# Patient Record
Sex: FEMALE | Race: WHITE | Employment: FULL TIME | URBAN - METROPOLITAN AREA
[De-identification: names, ages, dates, MRNs, and addresses within clinical notes are randomized per-mention and may not be internally consistent; named-entity substitution may affect disease eponyms.]

---

## 2017-01-20 ENCOUNTER — HOSPITAL ENCOUNTER (OUTPATIENT)
Dept: OTHER | Age: 47
Discharge: HOME | End: 2017-01-20
Attending: INTERNAL MEDICINE | Admitting: INTERNAL MEDICINE

## 2017-01-21 LAB
T3 FREE: 3.8 PG/ML (ref 2.3–4.2)
T4 FREE: 1 NG/DL (ref 0.9–1.8)
TSH SERPL DL<=0.05 MIU/L-ACNC: 0.28 UIU/ML (ref 0.27–4.2)

## 2017-01-24 ENCOUNTER — OFFICE VISIT (OUTPATIENT)
Dept: ENDOCRINOLOGY | Age: 47
End: 2017-01-24

## 2017-01-24 VITALS
OXYGEN SATURATION: 97 % | RESPIRATION RATE: 14 BRPM | DIASTOLIC BLOOD PRESSURE: 80 MMHG | TEMPERATURE: 98.3 F | WEIGHT: 249.8 LBS | HEIGHT: 70 IN | SYSTOLIC BLOOD PRESSURE: 118 MMHG | BODY MASS INDEX: 35.76 KG/M2 | HEART RATE: 78 BPM

## 2017-01-24 DIAGNOSIS — E04.1 LEFT THYROID NODULE: Primary | ICD-10-CM

## 2017-01-24 DIAGNOSIS — E66.9 OBESITY, UNSPECIFIED OBESITY SEVERITY, UNSPECIFIED OBESITY TYPE: ICD-10-CM

## 2017-01-24 DIAGNOSIS — E05.90 SUBCLINICAL HYPERTHYROIDISM: ICD-10-CM

## 2017-01-24 PROCEDURE — 99214 OFFICE O/P EST MOD 30 MIN: CPT | Performed by: INTERNAL MEDICINE

## 2017-01-24 RX ORDER — AMOXICILLIN 500 MG
CAPSULE ORAL
COMMUNITY
End: 2020-09-14 | Stop reason: DRUGHIGH

## 2017-03-27 ENCOUNTER — HOSPITAL ENCOUNTER (OUTPATIENT)
Dept: ULTRASOUND IMAGING | Age: 47
Discharge: HOME | End: 2017-03-27
Attending: INTERNAL MEDICINE | Admitting: INTERNAL MEDICINE

## 2017-03-27 DIAGNOSIS — E04.1 LEFT THYROID NODULE: ICD-10-CM

## 2017-03-27 DIAGNOSIS — E04.1 NONTOXIC SINGLE THYROID NODULE: ICD-10-CM

## 2017-05-02 ENCOUNTER — OFFICE VISIT (OUTPATIENT)
Dept: ENDOCRINOLOGY | Age: 47
End: 2017-05-02

## 2017-05-02 VITALS
DIASTOLIC BLOOD PRESSURE: 63 MMHG | SYSTOLIC BLOOD PRESSURE: 103 MMHG | WEIGHT: 241.6 LBS | HEART RATE: 66 BPM | TEMPERATURE: 98.4 F | BODY MASS INDEX: 34.67 KG/M2

## 2017-05-02 DIAGNOSIS — E04.1 LEFT THYROID NODULE: ICD-10-CM

## 2017-05-02 DIAGNOSIS — E05.90 SUBCLINICAL HYPERTHYROIDISM: ICD-10-CM

## 2017-05-02 DIAGNOSIS — E66.9 OBESITY, UNSPECIFIED OBESITY SEVERITY, UNSPECIFIED OBESITY TYPE: Primary | ICD-10-CM

## 2017-05-02 PROCEDURE — 99214 OFFICE O/P EST MOD 30 MIN: CPT | Performed by: INTERNAL MEDICINE

## 2017-08-08 ENCOUNTER — OFFICE VISIT (OUTPATIENT)
Dept: ENDOCRINOLOGY | Age: 47
End: 2017-08-08

## 2017-08-08 VITALS
SYSTOLIC BLOOD PRESSURE: 108 MMHG | HEIGHT: 70 IN | OXYGEN SATURATION: 96 % | HEART RATE: 82 BPM | DIASTOLIC BLOOD PRESSURE: 79 MMHG | BODY MASS INDEX: 34.59 KG/M2 | WEIGHT: 241.6 LBS

## 2017-08-08 DIAGNOSIS — E05.90 SUBCLINICAL HYPERTHYROIDISM: Primary | ICD-10-CM

## 2017-08-08 DIAGNOSIS — E04.1 LEFT THYROID NODULE: ICD-10-CM

## 2017-08-08 DIAGNOSIS — E66.9 OBESITY, UNSPECIFIED OBESITY SEVERITY, UNSPECIFIED OBESITY TYPE: ICD-10-CM

## 2017-08-08 PROCEDURE — 99214 OFFICE O/P EST MOD 30 MIN: CPT | Performed by: INTERNAL MEDICINE

## 2017-11-08 ENCOUNTER — HOSPITAL ENCOUNTER (OUTPATIENT)
Dept: OTHER | Age: 47
Discharge: HOME | End: 2017-11-08
Attending: INTERNAL MEDICINE | Admitting: INTERNAL MEDICINE

## 2017-11-09 LAB
T4 FREE: 1 NG/DL (ref 0.9–1.8)
TSH SERPL DL<=0.05 MIU/L-ACNC: 0.09 UIU/ML (ref 0.27–4.2)

## 2017-11-14 ENCOUNTER — OFFICE VISIT (OUTPATIENT)
Dept: ENDOCRINOLOGY | Age: 47
End: 2017-11-14

## 2017-11-14 VITALS
RESPIRATION RATE: 18 BRPM | BODY MASS INDEX: 37.28 KG/M2 | SYSTOLIC BLOOD PRESSURE: 113 MMHG | HEIGHT: 68 IN | DIASTOLIC BLOOD PRESSURE: 84 MMHG | WEIGHT: 246 LBS | HEART RATE: 91 BPM | OXYGEN SATURATION: 97 %

## 2017-11-14 DIAGNOSIS — E04.1 LEFT THYROID NODULE: ICD-10-CM

## 2017-11-14 DIAGNOSIS — E66.9 OBESITY WITHOUT SERIOUS COMORBIDITY, UNSPECIFIED CLASSIFICATION, UNSPECIFIED OBESITY TYPE: ICD-10-CM

## 2017-11-14 DIAGNOSIS — E05.90 SUBCLINICAL HYPERTHYROIDISM: Primary | ICD-10-CM

## 2017-11-14 PROCEDURE — 99214 OFFICE O/P EST MOD 30 MIN: CPT | Performed by: INTERNAL MEDICINE

## 2017-11-14 NOTE — PROGRESS NOTES
Endocrinology  Elmer Fernandez M.D. Phone: [phone]   FAX: [phone]       Clem Xavier   YOB: 1970    Date of Visit:  2017    Allergies   Allergen Reactions    Codeine     Sulfa Antibiotics      Outpatient Prescriptions Marked as Taking for the 17 encounter (Office Visit) with Derrick Serrato MD   Medication Sig Dispense Refill    naltrexone-bupropion (CONTRAVE) 8-90 MG per extended release tablet Take 2 tablets by mouth 2 times daily 120 tablet 2    Omega-3 Fatty Acids (FISH OIL) 1200 MG CAPS Take by mouth      esomeprazole (NEXIUM) 40 MG capsule Take 1 capsule by mouth every morning (before breakfast). 30 capsule 6    multivitamin (THERAGRAN) per tablet Take 1 tablet by mouth daily. Vitals:    17 1411   BP: 113/84   Site: Right Arm   Position: Sitting   Cuff Size: Large Adult   Pulse: 91   Resp: 18   SpO2: 97%   Weight: 246 lb (111.6 kg)   Height: 5' 8\" (1.727 m)     Body mass index is 37.4 kg/m².      Wt Readings from Last 3 Encounters:   17 246 lb (111.6 kg)   17 241 lb 9.6 oz (109.6 kg)   17 241 lb 9.6 oz (109.6 kg)     BP Readings from Last 3 Encounters:   17 113/84   17 108/79   17 103/63        Past Medical History:   Diagnosis Date    Acid reflux     Anesthesia     SLOW TO WAKE UP    [address] tumor [facility]ben[facility]) ([facility] [address]     Kidney anomaly, congenital 2006    Liver spot      Past Surgical History:   Procedure Laterality Date    BACK SURGERY       SECTION      HYSTERECTOMY  7/15/11    laproscopic supracervical hysterectomy    TUBAL LIGATION       Family History   Problem Relation Age of Onset    Cancer Mother      BREAST    Cancer Father      PROSTATE     History   Smoking Status    Former Smoker    Packs/day: 0.10    Years: 2.00   Smokeless Tobacco    Never Used     Comment: QUIT 20 YRS AGO      History   Alcohol Use    Yes     Comment: rare       HPI    Clem Xaveir is a 41 y.o. female who is here for a follow-up  for management of thyroid disease, obesity. PCP   [address] T[address] Sargent, DO     Patient has a PMH of benign brain tumor s/p radiation 10 yrs back, GERD, congenital kidney anomaly, DJD. She follows with radiation oncology at [facility]. TSH in 2011 was 0.36, 10/13, it was 0.04 and in 10/15 0.09. Free T4 was low in 2009. Total T4 and T3 have been  normal in 10/13 and 10/15. She had steroid injection in her spine on 03/04/16. No FH of thyroid disease. She had menorrhagia and had partial hysterectomy in 2011. 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 Review of Systems   Constitutional: Positive for malaise/fatigue. Negative for fever, chills, weight loss and diaphoresis. Eyes: Negative for blurred vision, double vision and photophobia. Respiratory: Negative for cough and hemoptysis. Cardiovascular: Negative for chest pain, palpitations and orthopnea. Gastrointestinal: Positive for heartburn. Genitourinary: Negative for dysuria, urgency, frequency, hematuria and flank pain. Musculoskeletal: Negative for myalgias, back pain, joint pain, falls and neck pain. Skin: Negative for itching and rash. Neurological: Negative for dizziness, tingling, tremors, sensory change, speech change, focal weakness, seizures, loss of consciousness and headaches. Endo/Heme/Allergies: Negative for environmental allergies and polydipsia. Does not bruise/bleed easily. Psychiatric/Behavioral: Positive for depression. Negative for suicidal ideas, hallucinations, memory loss and substance abuse. The patient is not nervous/anxious and does not have insomnia. Physical Exam   Constitutional: She is oriented to person, place, and time. She appears well-developed. No distress. HENT:   Mouth/Throat: Oropharynx is clear and moist.   Eyes: EOM are normal.   Neck: Thyromegaly present. Cardiovascular: Normal rate and normal heart sounds.     Pulmonary/Chest: Effort normal. No uptake at 2 hours measured 8.7%. Normal range at 2 hours is   1.3-13.4%. Thyroid uptake at 24 hours measured 31.6%. Normal range at 24 hours is   10-35%. The dedicated pinhole images demonstrate a dominant hot nodule within the   inferior aspect of the left thyroid lobe which demonstrates avid radiotracer   uptake, and suppression of a majority of the remainder of the thyroid gland. There is a nodular focus of mildly increased uptake within the mid right   thyroid lobe, which may correspond to an additional right thyroid nodule. Impression   IMPRESSION:   1. Thyroid uptake values are within normal limits. 2. Solitary dominant hot nodule within the inferior left thyroid lobe which   corresponds with the previously described nodule on ultrasound imaging. Typically, hot nodules are benign, however malignancy is not excluded. Suggest ultrasound-guided fine-needle aspiration or biopsy of this dominant   left thyroid lobe nodule. EXAMINATION:   THYROID ULTRASOUND       3/27/2017       COMPARISON:   03/17/2016       HISTORY:   ORDERING SYSTEM PROVIDED HISTORY: Left thyroid nodule   TECHNOLOGIST PROVIDED HISTORY:       FINDINGS:   Right thyroid lobe:  5.3 x 2.0 x 2.2 cm       Left thyroid lobe:  4.9 x 2.9 x 2.4 cm       Isthmus:  5 mm       Thyroid Gland:  Thyroid gland demonstrates heterogeneous echotexture with   increased vascularity.       Nodules: No change in the multiple solid nodules.  The dominant predominantly   solid nodule in the left mid gland measures 3.6 x 2.3 x 2.3 cm with a   peripheral hypoechoic capsule.       Cervical lymphadenopathy: No abnormal lymph nodes in the imaged portions of   the neck.           Impression   1. Unchanged multinodular goiter. Assessment/Plan       1. Subclinical hyperthyroidism    This 41 yrs old female was found to have low TSH on labs done in 01/16. Labs from [facility] showed low TSH since 2011.       Repeat labs in 03/16 showed

## 2017-12-27 ENCOUNTER — OFFICE VISIT (OUTPATIENT)
Dept: ORTHOPEDIC SURGERY | Age: 47
End: 2017-12-27

## 2017-12-27 VITALS
SYSTOLIC BLOOD PRESSURE: 123 MMHG | DIASTOLIC BLOOD PRESSURE: 78 MMHG | WEIGHT: 246.03 LBS | HEIGHT: 67 IN | HEART RATE: 62 BPM | BODY MASS INDEX: 38.62 KG/M2

## 2017-12-27 DIAGNOSIS — M79.642 HAND PAIN, LEFT: Primary | ICD-10-CM

## 2017-12-27 DIAGNOSIS — M65.322 TRIGGER INDEX FINGER OF LEFT HAND: ICD-10-CM

## 2017-12-27 PROCEDURE — 99213 OFFICE O/P EST LOW 20 MIN: CPT | Performed by: ORTHOPAEDIC SURGERY

## 2017-12-27 PROCEDURE — 73130 X-RAY EXAM OF HAND: CPT | Performed by: ORTHOPAEDIC SURGERY

## 2017-12-27 NOTE — PROGRESS NOTES
CC:  Left hand swelling, stiffness    HISTORY OF PRESENT ILLNESS:   Kailey Romero is a 41 y.o. female, right-hand-dominant, , nondiabetic, no history of rheumatoid arthritis, who presents for evaluation and treatment of left index finger stiffness and pain with dorsal hand swelling that began approximately 3 weeks ago. This affects daily activities involving gripping. Treatment to date has been rest, With mild relief. She has not been working as much over the past week, symptoms slightly better    Medical History:  Past Medical History:   Diagnosis Date    Acid reflux     Anesthesia     SLOW TO WAKE UP    Brain tumor (benign) ([address]     Kidney anomaly, congenital 2006    Liver spot      Past Surgical History:   Procedure Laterality Date    BACK SURGERY       SECTION      HYSTERECTOMY  7/15/11    laproscopic supracervical hysterectomy    TUBAL LIGATION       Family History   Problem Relation Age of Onset    Cancer Mother      BREAST    Cancer Father      PROSTATE     Social History     Social History    Marital status:      Spouse name: N/A    Number of children: N/A    Years of education: N/A     Social History Main Topics    Smoking status: Former Smoker     Packs/day: 0.10     Years: 2.00    Smokeless tobacco: Never Used      Comment: QUIT 21 YRS AGO    Alcohol use Yes      Comment: rare    Drug use: No    Sexual activity: Yes     Other Topics Concern    None     Social History Narrative    None     Current Outpatient Prescriptions   Medication Sig Dispense Refill    naltrexone-bupropion (CONTRAVE) 8-90 MG per extended release tablet Take 2 tablets by mouth 2 times daily 120 tablet 2    Omega-3 Fatty Acids (FISH OIL) 1200 MG CAPS Take by mouth      esomeprazole (NEXIUM) 40 MG capsule Take 1 capsule by mouth every morning (before breakfast). 30 capsule 6    multivitamin (THERAGRAN) per tablet Take 1 tablet by mouth daily.          No current facility-administered Follow-up as symptoms dictate. If symptoms persist, we would consider localized cortisone injection or oral steroid. All questions and concerns were addressed today. Patient is in agreement with the plan.         Lamar Head MD  Hand & Upper Extremity Surgery  [address] [facility] partner of [facility] ([facility])

## 2018-05-14 ENCOUNTER — OFFICE VISIT (OUTPATIENT)
Dept: ORTHOPEDIC SURGERY | Age: 48
End: 2018-05-14

## 2018-05-14 VITALS
BODY MASS INDEX: 38.62 KG/M2 | HEIGHT: 67 IN | DIASTOLIC BLOOD PRESSURE: 75 MMHG | HEART RATE: 88 BPM | WEIGHT: 246.03 LBS | SYSTOLIC BLOOD PRESSURE: 123 MMHG

## 2018-05-14 DIAGNOSIS — M54.16 LUMBAR RADICULITIS: ICD-10-CM

## 2018-05-14 DIAGNOSIS — M51.36 DDD (DEGENERATIVE DISC DISEASE), LUMBAR: ICD-10-CM

## 2018-05-14 DIAGNOSIS — M54.5 LOW BACK PAIN, UNSPECIFIED BACK PAIN LATERALITY, UNSPECIFIED CHRONICITY, WITH SCIATICA PRESENCE UNSPECIFIED: Primary | ICD-10-CM

## 2018-05-14 PROCEDURE — 99214 OFFICE O/P EST MOD 30 MIN: CPT | Performed by: PHYSICIAN ASSISTANT

## 2018-05-14 RX ORDER — PREDNISONE 10 MG/1
TABLET ORAL
Qty: 26 TABLET | Refills: 0 | Status: SHIPPED | OUTPATIENT
Start: 2018-05-14 | End: 2018-06-14 | Stop reason: ALTCHOICE

## 2018-05-14 RX ORDER — GABAPENTIN 300 MG/1
CAPSULE ORAL
Qty: 90 CAPSULE | Refills: 1 | Status: SHIPPED | OUTPATIENT
Start: 2018-05-14 | End: 2018-08-30

## 2018-05-15 ENCOUNTER — TELEPHONE (OUTPATIENT)
Dept: ORTHOPEDIC SURGERY | Age: 48
End: 2018-05-15

## 2018-05-31 ENCOUNTER — OFFICE VISIT (OUTPATIENT)
Dept: ORTHOPEDIC SURGERY | Age: 48
End: 2018-05-31

## 2018-05-31 VITALS
HEIGHT: 67 IN | WEIGHT: 246.03 LBS | BODY MASS INDEX: 38.62 KG/M2 | HEART RATE: 67 BPM | DIASTOLIC BLOOD PRESSURE: 69 MMHG | SYSTOLIC BLOOD PRESSURE: 116 MMHG

## 2018-05-31 DIAGNOSIS — M54.16 LUMBAR RADICULITIS: ICD-10-CM

## 2018-05-31 DIAGNOSIS — M51.36 DDD (DEGENERATIVE DISC DISEASE), LUMBAR: Primary | ICD-10-CM

## 2018-05-31 PROCEDURE — 99214 OFFICE O/P EST MOD 30 MIN: CPT | Performed by: PHYSICIAN ASSISTANT

## 2018-06-04 ENCOUNTER — TELEPHONE (OUTPATIENT)
Dept: ORTHOPEDIC SURGERY | Age: 48
End: 2018-06-04

## 2018-06-18 ENCOUNTER — HOSPITAL ENCOUNTER (OUTPATIENT)
Dept: PAIN MANAGEMENT | Age: 48
Discharge: HOME | End: 2018-06-18
Attending: PHYSICAL MEDICINE & REHABILITATION | Admitting: PHYSICAL MEDICINE & REHABILITATION

## 2018-06-18 VITALS
BODY MASS INDEX: 38.61 KG/M2 | HEART RATE: 46 BPM | SYSTOLIC BLOOD PRESSURE: 106 MMHG | RESPIRATION RATE: 16 BRPM | DIASTOLIC BLOOD PRESSURE: 66 MMHG | OXYGEN SATURATION: 99 % | TEMPERATURE: 97.6 F | HEIGHT: 67 IN | WEIGHT: 246 LBS

## 2018-06-18 ASSESSMENT — PAIN DESCRIPTION - DESCRIPTORS: DESCRIPTORS: THROBBING;SHARP

## 2018-06-18 ASSESSMENT — PAIN - FUNCTIONAL ASSESSMENT
PAIN_FUNCTIONAL_ASSESSMENT: 0-10
PAIN_FUNCTIONAL_ASSESSMENT: 0-10

## 2018-06-18 ASSESSMENT — ACTIVITIES OF DAILY LIVING (ADL): EFFECT OF PAIN ON DAILY ACTIVITIES: SITTING INCREASES PAIN

## 2018-07-02 ENCOUNTER — OFFICE VISIT (OUTPATIENT)
Dept: ORTHOPEDIC SURGERY | Age: 48
End: 2018-07-02

## 2018-07-02 VITALS
HEART RATE: 66 BPM | BODY MASS INDEX: 56.93 KG/M2 | HEIGHT: 55 IN | SYSTOLIC BLOOD PRESSURE: 146 MMHG | DIASTOLIC BLOOD PRESSURE: 74 MMHG | WEIGHT: 246 LBS

## 2018-07-02 DIAGNOSIS — M54.16 LUMBAR RADICULITIS: ICD-10-CM

## 2018-07-02 DIAGNOSIS — M51.36 DDD (DEGENERATIVE DISC DISEASE), LUMBAR: Primary | ICD-10-CM

## 2018-07-02 PROCEDURE — 99213 OFFICE O/P EST LOW 20 MIN: CPT | Performed by: PHYSICIAN ASSISTANT

## 2018-07-02 NOTE — PROGRESS NOTES
chills or fatigue  NEUROLOGIC: Denies tremors or seizures         PHYSICAL EXAM:    Vitals: Blood pressure (!) 146/74, pulse 66, height (!) 5.6\" (0.142 m), weight 246 lb (111.6 kg), last menstrual period 06/18/2011, not currently breastfeeding. GENERAL EXAM:  · General Apparence: Patient is adequately groomed with no evidence of malnutrition. · Orientation: The patient is oriented to time, place and person. · Mood & Affect:The patient's mood and affect are appropriate   · Vascular: Examination reveals no swelling tenderness in upper or lower extremities. · Lymphatic: The lymphatic examination bilaterally reveals all areas to be without enlargement or induration  · Sensation: Sensation is intact without deficit  · Coordination/Balance: Good coordination   ·   LUMBAR/SACRAL EXAMINATION:  · Inspection: Local inspection shows no step-off or bruising. Lumbar alignment is normal.  Sagittal and Coronal balance is neutral.      · Palpation:   No evidence of tenderness at the midline. No tenderness bilaterally at the paraspinal or trochanters. There is no step-off or paraspinal spasm. · Range of Motion:  Able to sit for flex without pain  · Strength:   Strength testing is 5/5 in all muscle groups tested. · Special Tests:   Straight leg raise and crossed SLR negative. Leg length and pelvis level.  0 out of 5 Capo's signs. · Skin: There are no rashes, ulcerations or lesions. · Reflexes: Reflexes are symmetrically 2+ at the patellar and ankle tendons; exception of left Achilles 1-2+. Clonus absent bilaterally at the feet. · Gait & station: Normal unassisted  · Additional Examinations:   · RIGHT LOWER EXTREMITY: Inspection/examination of the right lower extremity does not show any tenderness, deformity or injury. Range of motion is full. There is no gross instability. There are no rashes, ulcerations or lesions.  Strength and tone are normal.  · LEFT LOWER EXTREMITY:  Inspection/examination of the left lower extremity does not show any tenderness, deformity or injury. Range of motion is full. There is no gross instability. There are no rashes, ulcerations or lesions. Strength and tone are normal.    Diagnostic Testing:   Updated lumbar MRI scan report reviewed 5/29/2018 showing severe DDD L5-S1 with disc bulging and mild left foraminal stenosis, status post left L5-S1 laminectomy, disc bulging with mild central and left foraminal stenosis L3 4, L4 5 atrophic right kidney    4 views lumbar spine 5/14/2018 severe DDD L5-S1, no instability on flexion or extension.   Films stable compared to prior    Lumbar MRI 2016 shown L4 5 and L5-S1 DDD, mild to moderate central and foraminal stenosis L4 5, left L5-S1 hemilaminectomy, facet arthropathy        Impression:  1) Acute/chronic recurrent left sciatica--90% improved   2) Severe L5-S1 DDD, mild central & left FS  3) 1 functioning kidney    4) H/o laminectomy L5-S1---Dr. Velazquez Early 2001      Plan:  1) Call for Left L4-5 TX TROY #2 if radiating pain returns  2) [address]

## 2018-08-16 ENCOUNTER — TELEPHONE (OUTPATIENT)
Dept: ORTHOPEDIC SURGERY | Age: 48
End: 2018-08-16

## 2018-08-23 ENCOUNTER — HOSPITAL ENCOUNTER (OUTPATIENT)
Age: 48
Discharge: HOME | End: 2018-08-23
Payer: COMMERCIAL

## 2018-08-23 DIAGNOSIS — M54.16 LUMBAR RADICULITIS: ICD-10-CM

## 2018-08-23 DIAGNOSIS — M54.5 LOW BACK PAIN, UNSPECIFIED BACK PAIN LATERALITY, UNSPECIFIED CHRONICITY, WITH SCIATICA PRESENCE UNSPECIFIED: ICD-10-CM

## 2018-08-23 DIAGNOSIS — M51.36 DDD (DEGENERATIVE DISC DISEASE), LUMBAR: ICD-10-CM

## 2018-08-23 PROCEDURE — 36415 COLL VENOUS BLD VENIPUNCTURE: CPT

## 2018-08-23 PROCEDURE — 82565 ASSAY OF CREATININE: CPT

## 2018-08-24 LAB
CREAT SERPL-MCNC: 0.8 MG/DL (ref 0.6–1.1)
GFR AFRICAN AMERICAN: >60
GFR NON-AFRICAN AMERICAN: >60

## 2018-08-30 ENCOUNTER — OFFICE VISIT (OUTPATIENT)
Dept: ENDOCRINOLOGY | Age: 48
End: 2018-08-30

## 2018-08-30 VITALS
OXYGEN SATURATION: 93 % | HEART RATE: 83 BPM | BODY MASS INDEX: 40.13 KG/M2 | SYSTOLIC BLOOD PRESSURE: 118 MMHG | RESPIRATION RATE: 16 BRPM | DIASTOLIC BLOOD PRESSURE: 84 MMHG | WEIGHT: 264.8 LBS | HEIGHT: 68 IN

## 2018-08-30 DIAGNOSIS — E66.9 ADULT-ONSET OBESITY: ICD-10-CM

## 2018-08-30 DIAGNOSIS — E04.1 LEFT THYROID NODULE: ICD-10-CM

## 2018-08-30 DIAGNOSIS — E05.90 SUBCLINICAL HYPERTHYROIDISM: Primary | ICD-10-CM

## 2018-08-30 PROCEDURE — 99213 OFFICE O/P EST LOW 20 MIN: CPT | Performed by: INTERNAL MEDICINE

## 2018-08-30 NOTE — PROGRESS NOTES
Endocrinology  Tosha Zamora M.D. Phone: [phone]   FAX: [phone]       Eleanor Curry   YOB: 1970    Date of Visit:  2018    Allergies   Allergen Reactions    Codeine     Sulfa Antibiotics      Outpatient Prescriptions Marked as Taking for the 18 encounter (Office Visit) with Adriane Jean Baptiste MD   Medication Sig Dispense Refill    Omega-3 Fatty Acids (FISH OIL) 1200 MG CAPS Take by mouth      esomeprazole (NEXIUM) 40 MG capsule Take 1 capsule by mouth every morning (before breakfast). 30 capsule 6    multivitamin (THERAGRAN) per tablet Take 1 tablet by mouth daily. Vitals:    18 1506   BP: 118/84   Site: Left Arm   Position: Sitting   Cuff Size: Large Adult   Pulse: 83   Resp: 16   SpO2: 93%   Weight: 264 lb 12.8 oz (120.1 kg)   Height: 5' 8\" (1.727 m)     Body mass index is 40.26 kg/m². Wt Readings from Last 3 Encounters:   18 264 lb 12.8 oz (120.1 kg)   18 246 lb (111.6 kg)   18 246 lb (111.6 kg)     BP Readings from Last 3 Encounters:   18 118/84   18 (!) 146/74   18 106/66        Past Medical History:   Diagnosis Date    Acid reflux     Anesthesia     SLOW TO WAKE UP    Brain tumor (benign) (HCC)     Kidney anomaly, congenital 2006    Liver spot      Past Surgical History:   Procedure Laterality Date    BACK SURGERY       SECTION      HYSTERECTOMY  7/15/11    laproscopic supracervical hysterectomy    TUBAL LIGATION       Family History   Problem Relation Age of Onset    Cancer Mother         BREAST    Cancer Father         PROSTATE     History   Smoking Status    Former Smoker    Packs/day: 0.10    Years: 2.00   Smokeless Tobacco    Never Used     Comment: QUIT 20 YRS AGO      History   Alcohol Use    Yes     Comment: rare       HPI    Eleanor Curry is a 41 y.o. female who is here for a follow-up  for management of thyroid disease, obesity.    PCP   Lor Sargent DO     Patient has a PMH of benign brain tumor s/p radiation 10 yrs back, GERD, congenital kidney anomaly, DJD. She follows with radiation oncology at [facility]. TSH in 2011 was 0.36, 10/13, it was 0.04 and in 10/15 0.09. Free T4 was low in 2009. Total T4 and T3 have been  normal in 10/13 and 10/15. She had steroid injection in her spine on 03/04/16. No FH of thyroid disease. She had menorrhagia and had partial hysterectomy in 2011. She has been feeling more tired    Has gained weight      Review of Systems   Constitutional: Positive for malaise/fatigue. Negative for fever, chills, weight loss and diaphoresis. Eyes: Negative for blurred vision, double vision and photophobia. Respiratory: Negative for cough and hemoptysis. Cardiovascular: Negative for chest pain, palpitations and orthopnea. Gastrointestinal: Positive for heartburn. Genitourinary: Negative for dysuria, urgency, frequency, hematuria and flank pain. Musculoskeletal: Negative for myalgias, back pain, joint pain, falls and neck pain. Skin: Negative for itching and rash. Neurological: Negative for dizziness, tingling, tremors, sensory change, speech change, focal weakness, seizures, loss of consciousness and headaches. Endo/Heme/Allergies: Negative for environmental allergies and polydipsia. Does not bruise/bleed easily. Psychiatric/Behavioral: Positive for depression. Negative for suicidal ideas, hallucinations, memory loss and substance abuse. The patient is not nervous/anxious and does not have insomnia. Physical Exam   Constitutional: She is oriented to person, place, and time. She appears well-developed. No distress. HENT:   Mouth/Throat: Oropharynx is clear and moist.   Eyes: EOM are normal.   Neck: Thyromegaly present. Cardiovascular: Normal rate and normal heart sounds. Pulmonary/Chest: Effort normal. No respiratory distress. She has no wheezes. Abdominal: Soft.  Bowel sounds are normal. There is no tenderness. Musculoskeletal: She exhibits no edema. Neurological: She is alert and oriented to person, place, and time. Skin: Skin is warm and dry. She is not diaphoretic. Psychiatric: Her behavior is normal. Thought content normal.           Lab Results   Component Value Date    TSH 0.09 (L) 11/08/2017    TSH 0.28 01/20/2017    TSH 0.61 07/20/2016      EXAMINATION:   THYROID ULTRASOUND      3/17/2016      COMPARISON:   None. HISTORY:   Thyroid disease      History of brain tumor with radiation. Goiter. FINDINGS:   Right thyroid lobe: 5.7 x 2.2 x 1.8 cm. Left thyroid lobe: 5.3 x 2.3 x 2.6 cm. Isthmus: 0.4 cm. Thyroid Gland: Thyroid gland is diffusely heterogeneous in echogenicity. Nodules: There is a 1.1 x 1 x 1.2 cm isoechoic rounded nodule within the   anterior mid thyroid which has a thin hypoechoic rim. Internal vascularity   is seen within this. There is a 0.6 cm heterogeneous hypoechoic nodule in   the far posterior mid thyroid. Within the left thyroid there is a 3.8 x 2.1   x 2.4 cm heterogeneous isoechoic to slightly hyperechoic nodule which   contains internal vascularity. Small cystic component is seen within the   superior aspect. Impression   IMPRESSION:   Heterogeneous multinodular thyroid with a 3.8 cm dominant solid nodule in the   left thyroid with internal vascularity. This is amenable to fine-needle   aspiration. EXAMINATION:   I123 Thyroid Uptake and Scan. 4/13/2016 9:52 am      TECHNIQUE:   280 microcuries I123 sodium iodide was administered p. o. Then, thyroid   uptake measurements were performed at 2 and 24 hours. Pinhole images of the   thyroid were obtained at 24 hours. COMPARISON:   Thyroid ultrasound 03/17/2016      HISTORY:   Hyperthyroidism      Dominant nodule within the left thyroid lobe on recent ultrasound. FINDINGS:   Thyroid uptake at 2 hours measured 8.7%. Normal range at 2 hours is   1.3-13.4%.       Thyroid uptake at 24 scan showed presence of the hot nodule in left lobe which is the etiology of subclinical hyperthyroidism. TSh 0.09 with free T4 of 1.0 in 11/17    She is asymptomatic. She has gained weight    Will repeat labs. 2. Thyroid nodule. Left lobe has a 3.5 cm nodule. FNA biopsy of Left thyroid nodule done on 04/21/16 showed features consistent with benign hyperplastic nodule. Repeat US in 3/17  Showed the nodules to be stable. Will order repeat US       3. Meningioma . S/p radiation. Follows with radiation oncology. Pituitary work-up in 2016 showed normal prolactin, IGF-1, FSH, LH      4. Obesity. Tried  contrave in 2017    Lost weight initially but then gained weight and stopped it. Advised to consider low carb , high fat diet.

## 2018-09-06 ENCOUNTER — HOSPITAL ENCOUNTER (OUTPATIENT)
Dept: ULTRASOUND IMAGING | Age: 48
Discharge: HOME | End: 2018-09-06
Payer: COMMERCIAL

## 2018-09-06 DIAGNOSIS — E05.90 SUBCLINICAL HYPERTHYROIDISM: ICD-10-CM

## 2018-09-06 DIAGNOSIS — E04.1 LEFT THYROID NODULE: ICD-10-CM

## 2018-09-06 PROCEDURE — 76536 US EXAM OF HEAD AND NECK: CPT

## 2018-09-07 ENCOUNTER — TELEPHONE (OUTPATIENT)
Dept: ENDOCRINOLOGY | Age: 48
End: 2018-09-07

## 2018-09-07 ENCOUNTER — HOSPITAL ENCOUNTER (OUTPATIENT)
Age: 48
Discharge: HOME | End: 2018-09-07
Payer: COMMERCIAL

## 2018-09-07 DIAGNOSIS — E05.90 SUBCLINICAL HYPERTHYROIDISM: ICD-10-CM

## 2018-09-07 PROCEDURE — 84443 ASSAY THYROID STIM HORMONE: CPT

## 2018-09-07 PROCEDURE — 36415 COLL VENOUS BLD VENIPUNCTURE: CPT

## 2018-09-07 PROCEDURE — 84439 ASSAY OF FREE THYROXINE: CPT

## 2018-09-07 NOTE — TELEPHONE ENCOUNTER
I thought the lab said they would add it. Explain to her that it was not added. Ask her to go to lab and have the labs done. Orders are in her chart.

## 2018-09-08 LAB
T4 FREE: 1.1 NG/DL (ref 0.9–1.8)
TSH SERPL DL<=0.05 MIU/L-ACNC: 0.13 UIU/ML (ref 0.27–4.2)

## 2018-10-22 ENCOUNTER — HOSPITAL ENCOUNTER (OUTPATIENT)
Dept: CT IMAGING | Age: 48
Discharge: HOME | End: 2018-10-22
Payer: COMMERCIAL

## 2018-10-22 DIAGNOSIS — E66.09 OTHER OBESITY DUE TO EXCESS CALORIES: ICD-10-CM

## 2018-10-22 DIAGNOSIS — R39.14 FEELING OF INCOMPLETE BLADDER EMPTYING: ICD-10-CM

## 2018-10-22 DIAGNOSIS — N20.0 CALCULUS OF KIDNEY: ICD-10-CM

## 2018-10-22 PROCEDURE — 6360000004 HC RX CONTRAST MEDICATION: Performed by: UROLOGY

## 2018-10-22 PROCEDURE — 74178 CT ABD&PLV WO CNTR FLWD CNTR: CPT

## 2018-10-22 RX ADMIN — IOPAMIDOL 75 ML: 755 INJECTION, SOLUTION INTRAVENOUS at 09:51

## 2019-03-04 ENCOUNTER — OFFICE VISIT (OUTPATIENT)
Dept: ENDOCRINOLOGY | Age: 49
End: 2019-03-04
Payer: COMMERCIAL

## 2019-03-04 VITALS
DIASTOLIC BLOOD PRESSURE: 78 MMHG | WEIGHT: 258 LBS | HEIGHT: 68 IN | OXYGEN SATURATION: 97 % | RESPIRATION RATE: 14 BRPM | BODY MASS INDEX: 39.1 KG/M2 | SYSTOLIC BLOOD PRESSURE: 120 MMHG | HEART RATE: 72 BPM

## 2019-03-04 DIAGNOSIS — E05.90 SUBCLINICAL HYPERTHYROIDISM: ICD-10-CM

## 2019-03-04 DIAGNOSIS — E66.9 ADULT-ONSET OBESITY: ICD-10-CM

## 2019-03-04 DIAGNOSIS — E04.1 LEFT THYROID NODULE: ICD-10-CM

## 2019-03-04 DIAGNOSIS — E05.90 SUBCLINICAL HYPERTHYROIDISM: Primary | ICD-10-CM

## 2019-03-04 PROCEDURE — 99214 OFFICE O/P EST MOD 30 MIN: CPT | Performed by: INTERNAL MEDICINE

## 2019-03-05 LAB
T4 FREE: 0.9 NG/DL (ref 0.9–1.8)
TSH SERPL DL<=0.05 MIU/L-ACNC: 1.06 UIU/ML (ref 0.27–4.2)

## 2019-09-03 ENCOUNTER — TELEPHONE (OUTPATIENT)
Dept: ENDOCRINOLOGY | Age: 49
End: 2019-09-03

## 2019-09-03 DIAGNOSIS — E04.1 LEFT THYROID NODULE: Primary | ICD-10-CM

## 2019-09-03 NOTE — TELEPHONE ENCOUNTER
Spoke with Rafita Arnold at [address] [place] [address] and let her know Thyroid US order is in her chart. Rafita Anrold will call the pt to schedule.

## 2019-09-04 ENCOUNTER — HOSPITAL ENCOUNTER (OUTPATIENT)
Dept: ULTRASOUND IMAGING | Age: 49
Discharge: HOME | End: 2019-09-04
Payer: COMMERCIAL

## 2019-09-04 DIAGNOSIS — E04.1 LEFT THYROID NODULE: ICD-10-CM

## 2019-09-04 PROCEDURE — 76536 US EXAM OF HEAD AND NECK: CPT

## 2019-09-10 ENCOUNTER — OFFICE VISIT (OUTPATIENT)
Dept: ENDOCRINOLOGY | Age: 49
End: 2019-09-10
Payer: COMMERCIAL

## 2019-09-10 VITALS
WEIGHT: 255.6 LBS | HEIGHT: 68 IN | SYSTOLIC BLOOD PRESSURE: 120 MMHG | DIASTOLIC BLOOD PRESSURE: 79 MMHG | BODY MASS INDEX: 38.74 KG/M2 | OXYGEN SATURATION: 97 % | HEART RATE: 57 BPM

## 2019-09-10 DIAGNOSIS — E05.90 SUBCLINICAL HYPERTHYROIDISM: Primary | ICD-10-CM

## 2019-09-10 DIAGNOSIS — E04.1 LEFT THYROID NODULE: ICD-10-CM

## 2019-09-10 DIAGNOSIS — E66.9 ADULT-ONSET OBESITY: ICD-10-CM

## 2019-09-10 DIAGNOSIS — E05.90 SUBCLINICAL HYPERTHYROIDISM: ICD-10-CM

## 2019-09-10 LAB
T4 FREE: 1 NG/DL (ref 0.9–1.8)
TSH SERPL DL<=0.05 MIU/L-ACNC: 0.69 UIU/ML (ref 0.27–4.2)

## 2019-09-10 PROCEDURE — 99214 OFFICE O/P EST MOD 30 MIN: CPT | Performed by: INTERNAL MEDICINE

## 2019-09-10 NOTE — PROGRESS NOTES
Endocrinology  Ni Tariq M.D. Phone: [phone]   FAX: [phone]       Moises Salcido   YOB: 1970    Date of Visit:  9/10/2019    Allergies   Allergen Reactions    Codeine     Pork-Derived Products Diarrhea     Vomiting. Pt can eat some pork products    Sulfa Antibiotics     Codeine Nausea And Vomiting    Sulfa Antibiotics Nausea And Vomiting     Outpatient Medications Marked as Taking for the 9/10/19 encounter (Office Visit) with Arlyn Mckoy MD   Medication Sig Dispense Refill    esomeprazole Magnesium (NEXIUM) 20 MG PACK Take 40 mg by mouth daily      Multiple Vitamins-Minerals (THERAPEUTIC MULTIVITAMIN-MINERALS) tablet Take 1 tablet by mouth daily      Omega-3 Fatty Acids (FISH OIL) 1000 MG CAPS Take 1,000 mg by mouth daily      Omega-3 Fatty Acids (FISH OIL) 1200 MG CAPS Take by mouth      esomeprazole (NEXIUM) 40 MG capsule Take 1 capsule by mouth every morning (before breakfast). 30 capsule 6    multivitamin (THERAGRAN) per tablet Take 1 tablet by mouth daily. Vitals:    09/10/19 0928   BP: 120/79   Site: Left Upper Arm   Position: Sitting   Cuff Size: Medium Adult   Pulse: 57   SpO2: 97%   Weight: 255 lb 9.6 oz (115.9 kg)   Height: 5' 8\" (1.727 m)     Body mass index is 38.86 kg/m².      Wt Readings from Last 3 Encounters:   09/10/19 255 lb 9.6 oz (115.9 kg)   19 258 lb (117 kg)   18 264 lb 12.8 oz (120.1 kg)     BP Readings from Last 3 Encounters:   09/10/19 120/79   19 120/78   18 118/84        Past Medical History:   Diagnosis Date    Acid reflux     Anesthesia     SLOW TO WAKE UP    Brain tumor (benign) (HCC)     GERD (gastroesophageal reflux disease)     Kidney anomaly, congenital 2006    Kidney function abnormal     undeveloped kidney at birth (pt does not remeber which one)    Liver spot      Past Surgical History:   Procedure Laterality Date    BACK SURGERY       SECTION      GALLBLADDER SURGERY EXAMINATION:   THYROID ULTRASOUND       3/27/2017       COMPARISON:   03/17/2016       HISTORY:   ORDERING SYSTEM PROVIDED HISTORY: Left thyroid nodule   TECHNOLOGIST PROVIDED HISTORY:       FINDINGS:   Right thyroid lobe:  5.3 x 2.0 x 2.2 cm       Left thyroid lobe:  4.9 x 2.9 x 2.4 cm       Isthmus:  5 mm       Thyroid Gland:  Thyroid gland demonstrates heterogeneous echotexture with   increased vascularity.       Nodules: No change in the multiple solid nodules.  The dominant predominantly   solid nodule in the left mid gland measures 3.6 x 2.3 x 2.3 cm with a   peripheral hypoechoic capsule.       Cervical lymphadenopathy: No abnormal lymph nodes in the imaged portions of   the neck.           Impression   1. Unchanged multinodular goiter. EXAMINATION:   THYROID ULTRASOUND       9/4/2019       COMPARISON:   09/06/2018       HISTORY:   ORDERING SYSTEM PROVIDED HISTORY: Left thyroid nodule   TECHNOLOGIST PROVIDED HISTORY:   Reason for exam:->H/o nodules       FINDINGS:   MEASUREMENTS:       Right thyroid lobe:  6.8 by 1.8 x 2.4 cm.       Left thyroid lobe:  5.7 x 2.8 x 2.5 cm.       Isthmus:  7 mm.       Thyroid Gland General:  Thyroid gland demonstrates very heterogeneous   echotexture and increased vascularity.       Thyroid Nodules: ACR TI-RADS scoring below;       PLEASE NOTE: Spongiform composition negates further scoring.       Nodule 1: Left mid to lower       Size:3.9 x 2.2 x 2.1 cm       Composition:  Mixed partially cystic, predominately solid.  Score 1       Echogenicity: Isoechoic.  Score 1.       Shape:  Wider than tall.  Score 0.       Margin:  Smooth well-defined.  Score 0       Echogenic foci: Macrocalcification greater than 1 mm.  Score 0       NODULE 1: Total points: 2       Prior biopsy or change from prior: Prior biopsy not malignant, unchanged in   size.  Currently scored lower than previous TR 3       ACR RISK CATEGORY/IMPRESSION: TR 2       RECOMMENDATION: In the absence of

## 2020-01-27 ENCOUNTER — OFFICE VISIT (OUTPATIENT)
Dept: ORTHOPEDIC SURGERY | Age: 50
End: 2020-01-27
Payer: COMMERCIAL

## 2020-01-27 VITALS — BODY MASS INDEX: 36.88 KG/M2 | WEIGHT: 249 LBS | HEIGHT: 69 IN

## 2020-01-27 PROCEDURE — 99203 OFFICE O/P NEW LOW 30 MIN: CPT | Performed by: ORTHOPAEDIC SURGERY

## 2020-01-27 RX ORDER — METHYLPREDNISOLONE 4 MG/1
TABLET ORAL
Qty: 1 KIT | Refills: 0 | Status: SHIPPED | OUTPATIENT
Start: 2020-01-27 | End: 2020-09-14 | Stop reason: ALTCHOICE

## 2020-01-27 RX ORDER — CYCLOBENZAPRINE HCL 10 MG
10 TABLET ORAL 3 TIMES DAILY PRN
Qty: 30 TABLET | Refills: 0 | Status: SHIPPED | OUTPATIENT
Start: 2020-01-27 | End: 2020-02-06

## 2020-01-27 NOTE — PROGRESS NOTES
Chief Complaint  New Patient (Left leg: tightness started 3 wks ago, no injury, decrease tightness in the morning and the evening increase pain, pain stays in calf, no numbness/tingling, has HX DDD of lumbar that has caused numbness/tingliing left foot, but no numbness/tinigling, cant take a full stride w/o somd tightness, some weakness which caused a buckle of her knee, no knee pain at all )      History of Present Illness:  Abdi Hong is a 54 y.o. y/o female who presents today for evaluation of her left leg. About 3 weeks ago she began having some tightness and soreness in her calf muscles in the left side. She does have a history of lumbar DJD. She does have some tingling in her foot occasionally. She feels like the pain is different than pain radiating down her leg. She has pain with stretching as well as pushing off. She has had some mild cramping as well. No knee pain. She has been taking ibuprofen.     Occupation/activities: [employer]     Medical History  Past Medical History:   Diagnosis Date    Acid reflux     Anesthesia     SLOW TO WAKE UP    Brain tumor (benign) ([facility] [address]     GERD (gastroesophageal reflux disease)     Kidney anomaly, congenital     Kidney function abnormal     undeveloped kidney at birth (pt does not remeber which one)    Liver spot        Past Surgical History:   Procedure Laterality Date    BACK SURGERY       SECTION      GALLBLADDER SURGERY  2018    HYSTERECTOMY  7/15/11    laproscopic supracervical hysterectomy    PARTIAL HYSTERECTOMY      TUBAL LIGATION         Social History     Socioeconomic History    Marital status:      Spouse name: Not on file    Number of children: Not on file    Years of education: Not on file    Highest education level: Not on file   Occupational History    Not on file   Social Needs    Financial resource strain: Not on file    Food insecurity:     Worry: Not on file     Inability: Not on file   Ivana Gaffney Transportation needs:     Medical: Not on file     Non-medical: Not on file   Tobacco Use    Smoking status: Former Smoker     Packs/day: 0.10     Years: 2.00     Pack years: 0.20    Smokeless tobacco: Never Used    Tobacco comment: QUIT 20 YRS AGO   Substance and Sexual Activity    Alcohol use: Yes     Comment: rare    Drug use: No    Sexual activity: Yes   Lifestyle    Physical activity:     Days per week: Not on file     Minutes per session: Not on file    Stress: Not on file   Relationships    Social connections:     Talks on phone: Not on file     Gets together: Not on file     Attends Muslim service: Not on file     Active member of club or organization: Not on file     Attends meetings of clubs or organizations: Not on file     Relationship status: Not on file    Intimate partner violence:     Fear of current or ex partner: Not on file     Emotionally abused: Not on file     Physically abused: Not on file     Forced sexual activity: Not on file   Other Topics Concern    Not on file   Social History Narrative    ** Merged History Encounter **            Family History   Problem Relation Age of Onset    Cancer Mother         BREAST    Cancer Father         PROSTATE        Review of Systems  Pertinent items are noted in HPI  Review of systems reviewed from Patient History Form dated on 1/27/2020 and available in the patient's chart under the Media tab. Vital Signs  There were no vitals filed for this visit. General Exam:   Constitutional: Patient is adequately groomed with no evidence of malnutrition  Mental Status: The patient is oriented to time, place and person. The patient's mood and affect are appropriate. Lymphatic: The lymphatic examination bilaterally reveals all areas to be without enlargement or induration. Neurological: The patient has good coordination. There is no weakness or sensory deficit.      Gait: Normal    Left leg Examination  Inspection: No swelling or erythema or

## 2020-09-14 ENCOUNTER — OFFICE VISIT (OUTPATIENT)
Dept: ENDOCRINOLOGY | Age: 50
End: 2020-09-14
Payer: COMMERCIAL

## 2020-09-14 VITALS
OXYGEN SATURATION: 98 % | HEART RATE: 59 BPM | HEIGHT: 69 IN | BODY MASS INDEX: 31.07 KG/M2 | WEIGHT: 209.8 LBS | DIASTOLIC BLOOD PRESSURE: 77 MMHG | SYSTOLIC BLOOD PRESSURE: 116 MMHG

## 2020-09-14 DIAGNOSIS — E05.90 SUBCLINICAL HYPERTHYROIDISM: ICD-10-CM

## 2020-09-14 DIAGNOSIS — E04.1 LEFT THYROID NODULE: ICD-10-CM

## 2020-09-14 LAB
T4 FREE: 1.1 NG/DL (ref 0.9–1.8)
TSH SERPL DL<=0.05 MIU/L-ACNC: 0.43 UIU/ML (ref 0.27–4.2)

## 2020-09-14 PROCEDURE — 99214 OFFICE O/P EST MOD 30 MIN: CPT | Performed by: INTERNAL MEDICINE

## 2020-09-14 NOTE — PROGRESS NOTES
Endocrinology  Eriberto Jones M.D. Phone: [phone]   FAX: [phone]       Darek Hopkins   YOB: 1970    Date of Visit:  2020    Allergies   Allergen Reactions    Other     Sulfa Antibiotics     Sulfur     Codeine     Pork-Derived Products Diarrhea     Vomiting. Pt can eat some pork products    Codeine Nausea And Vomiting    Sulfa Antibiotics Nausea And Vomiting     Outpatient Medications Marked as Taking for the 20 encounter (Office Visit) with Will Mcintosh MD   Medication Sig Dispense Refill    esomeprazole Magnesium (NEXIUM) 20 MG PACK Take 20 mg by mouth daily       Multiple Vitamins-Minerals (THERAPEUTIC MULTIVITAMIN-MINERALS) tablet Take 1 tablet by mouth daily      Omega-3 Fatty Acids (FISH OIL) 1000 MG CAPS Take 1,000 mg by mouth daily           Vitals:    20 0947   BP: 116/77   Site: Left Upper Arm   Position: Sitting   Cuff Size: Medium Adult   Pulse: 59   SpO2: 98%   Weight: 209 lb 12.8 oz (95.2 kg)   Height: 5' 9\" (1.753 m)     Body mass index is 30.98 kg/m².      Wt Readings from Last 3 Encounters:   20 209 lb 12.8 oz (95.2 kg)   20 249 lb (112.9 kg)   09/10/19 255 lb 9.6 oz (115.9 kg)     BP Readings from Last 3 Encounters:   20 116/77   09/10/19 120/79   19 120/78        Past Medical History:   Diagnosis Date    Acid reflux     Anesthesia     SLOW TO WAKE UP    Brain tumor (benign) (HCC)     GERD (gastroesophageal reflux disease)     Kidney anomaly, congenital     Kidney function abnormal     undeveloped kidney at birth (pt does not remeber which one)    Liver spot      Past Surgical History:   Procedure Laterality Date    BACK SURGERY       SECTION      GALLBLADDER SURGERY  2018    HYSTERECTOMY  7/15/11    laproscopic supracervical hysterectomy    PARTIAL HYSTERECTOMY      TUBAL LIGATION       Family History   Problem Relation Age of Onset    Cancer Mother         BREAST    Cancer Father         PROSTATE     Social History     Tobacco Use   Smoking Status Former Smoker    Packs/day: 0.10    Years: 2.00    Pack years: 0.20   Smokeless Tobacco Never Used   Tobacco Comment    QUIT 21 YRS AGO      Social History     Substance and Sexual Activity   Alcohol Use Yes    Comment: rare       HPI    Tiffany Pina is a 54 y.o. female who is here for a follow-up  for management of thyroid disease, obesity. PCP   Simba Tripp DO     Patient has a PMH of benign brain tumor s/p radiation 10 yrs back, GERD, congenital kidney anomaly, DJD. She follows with radiation oncology at [address] [place] [address]. TSH in 2011 was 0.36, 10/13, it was 0.04 and in 10/15 0.09. Free T4 was low in 2009. Total T4 and T3 have been  normal in 10/13 and 10/15. She had steroid injection in her spine on 03/04/16. No FH of thyroid disease. She had menorrhagia and had partial hysterectomy in 2011. Has lost weight. No palpitations, sweating, heat intolerance. Denies fatigue. She has a large 3.5 cm nodule in left lobe. FNA biopsy of Left thyroid nodule done on 04/21/16 showed features consistent with benign hyperplastic nodule. Repeat US in 3/17 ,  09/18, 09/19  Showed the nodule to be stable. No difficulty swallowing. She is on low calorie diet  Used Contrave in the past    Has lost 40-50  lbs since last visit with diet changes    Review of Systems   Constitutional: Positive for malaise/fatigue. Negative for fever, chills, weight loss and diaphoresis. Eyes: Negative for blurred vision, double vision and photophobia. Respiratory: Negative for cough and hemoptysis. Cardiovascular: Negative for chest pain, palpitations and orthopnea. Gastrointestinal:  Genitourinary: Negative for dysuria, urgency, frequency, hematuria and flank pain. Musculoskeletal: Negative for myalgias, back pain, joint pain, falls and neck pain. Skin: Negative for itching and rash.    Neurological: Negative for dizziness, tingling, tremors, sensory change, speech change, focal weakness, seizures, loss of consciousness and headaches. Endo/Heme/Allergies: Negative for environmental allergies and polydipsia. Does not bruise/bleed easily. Psychiatric/Behavioral: Negative for suicidal ideas, hallucinations, memory loss and substance abuse. The patient is not nervous/anxious and does not have insomnia. Physical Exam   Constitutional: She is oriented to person, place, and time. She appears well-developed. No distress. HENT:   Mouth/Throat: Oropharynx is clear and moist.   Eyes: EOM are normal.   Neck: Thyromegaly present. Cardiovascular: Normal rate and normal heart sounds. Pulmonary/Chest: Effort normal. No respiratory distress. She has no wheezes. Abdominal: Soft. Bowel sounds are normal. There is no tenderness. Musculoskeletal: She exhibits no edema. Neurological: She is alert and oriented to person, place, and time. Skin: Skin is warm and dry. She is not diaphoretic. Psychiatric: Her behavior is normal. Thought content normal.           Lab Results   Component Value Date    TSH 0.69 09/10/2019    TSH 1.06 03/04/2019    TSH 0.13 (L) 09/07/2018      EXAMINATION:   THYROID ULTRASOUND      3/17/2016      COMPARISON:   None. HISTORY:   Thyroid disease      History of brain tumor with radiation. Goiter. FINDINGS:   Right thyroid lobe: 5.7 x 2.2 x 1.8 cm. Left thyroid lobe: 5.3 x 2.3 x 2.6 cm. Isthmus: 0.4 cm. Thyroid Gland: Thyroid gland is diffusely heterogeneous in echogenicity. Nodules: There is a 1.1 x 1 x 1.2 cm isoechoic rounded nodule within the   anterior mid thyroid which has a thin hypoechoic rim. Internal vascularity   is seen within this. There is a 0.6 cm heterogeneous hypoechoic nodule in   the far posterior mid thyroid.  Within the left thyroid there is a 3.8 x 2.1   x 2.4 cm heterogeneous isoechoic to slightly hyperechoic nodule which   contains internal vascularity. Small cystic component is seen within the   superior aspect. Impression   IMPRESSION:   Heterogeneous multinodular thyroid with a 3.8 cm dominant solid nodule in the   left thyroid with internal vascularity. This is amenable to fine-needle   aspiration. EXAMINATION:   I123 Thyroid Uptake and Scan. 4/13/2016 9:52 am      TECHNIQUE:   280 microcuries I123 sodium iodide was administered p. o. Then, thyroid   uptake measurements were performed at 2 and 24 hours. Pinhole images of the   thyroid were obtained at 24 hours. COMPARISON:   Thyroid ultrasound 03/17/2016      HISTORY:   Hyperthyroidism      Dominant nodule within the left thyroid lobe on recent ultrasound. FINDINGS:   Thyroid uptake at 2 hours measured 8.7%. Normal range at 2 hours is   1.3-13.4%. Thyroid uptake at 24 hours measured 31.6%. Normal range at 24 hours is   10-35%. The dedicated pinhole images demonstrate a dominant hot nodule within the   inferior aspect of the left thyroid lobe which demonstrates avid radiotracer   uptake, and suppression of a majority of the remainder of the thyroid gland. There is a nodular focus of mildly increased uptake within the mid right   thyroid lobe, which may correspond to an additional right thyroid nodule. Impression   IMPRESSION:   1. Thyroid uptake values are within normal limits. 2. Solitary dominant hot nodule within the inferior left thyroid lobe which   corresponds with the previously described nodule on ultrasound imaging. Typically, hot nodules are benign, however malignancy is not excluded. Suggest ultrasound-guided fine-needle aspiration or biopsy of this dominant   left thyroid lobe nodule.         EXAMINATION:   THYROID ULTRASOUND       3/27/2017       COMPARISON:   03/17/2016       HISTORY:   ORDERING SYSTEM PROVIDED HISTORY: Left thyroid nodule   TECHNOLOGIST PROVIDED HISTORY:       FINDINGS:   Right thyroid lobe:  5.3 x 2.0 x 2.2 cm     Left thyroid lobe:  4.9 x 2.9 x 2.4 cm       Isthmus:  5 mm       Thyroid Gland:  Thyroid gland demonstrates heterogeneous echotexture with   increased vascularity.       Nodules: No change in the multiple solid nodules.  The dominant predominantly   solid nodule in the left mid gland measures 3.6 x 2.3 x 2.3 cm with a   peripheral hypoechoic capsule.       Cervical lymphadenopathy: No abnormal lymph nodes in the imaged portions of   the neck.           Impression   1. Unchanged multinodular goiter. EXAMINATION:   THYROID ULTRASOUND       9/4/2019       COMPARISON:   09/06/2018       HISTORY:   ORDERING SYSTEM PROVIDED HISTORY: Left thyroid nodule   TECHNOLOGIST PROVIDED HISTORY:   Reason for exam:->H/o nodules       FINDINGS:   MEASUREMENTS:       Right thyroid lobe:  6.8 by 1.8 x 2.4 cm.       Left thyroid lobe:  5.7 x 2.8 x 2.5 cm.       Isthmus:  7 mm.       Thyroid Gland General:  Thyroid gland demonstrates very heterogeneous   echotexture and increased vascularity.       Thyroid Nodules: ACR TI-RADS scoring below;       PLEASE NOTE: Spongiform composition negates further scoring.       Nodule 1: Left mid to lower       Size:3.9 x 2.2 x 2.1 cm       Composition:  Mixed partially cystic, predominately solid.  Score 1       Echogenicity: Isoechoic.  Score 1.       Shape: Wider than tall.  Score 0.       Margin:  Smooth well-defined.  Score 0       Echogenic foci: Macrocalcification greater than 1 mm.  Score 0       NODULE 1: Total points: 2       Prior biopsy or change from prior: Prior biopsy not malignant, unchanged in   size.  Currently scored lower than previous TR 3       ACR RISK CATEGORY/IMPRESSION: TR 2       RECOMMENDATION: In the absence of significant risk factors, further follow-up   is no longer warranted. Assessment/Plan       1. Subclinical hyperthyroidism    This 54 yrs old female was found to have low TSH on labs done in 01/16.   Labs from [facility] showed low TSH since 2011.      Repeat labs in 03/16 showed low TSH with normal free T4 and T3.  TPO antibodies positive. TRAb negative. Thyroid uptake and scan showed presence of the hot nodule in left lobe which is the etiology of subclinical hyperthyroidism. TSh 0.09 with free T4 of 1.0 in 11/17 09/18 TSH 0.13  Free T4 1.1      TSH and free T4 were normal in 03/19 and 09/19    She is asymptomatic. Will repeat labs today           2. Thyroid nodule. Left lobe has a 3.5 cm nodule. FNA biopsy of Left thyroid nodule done on 04/21/16 showed features consistent with benign hyperplastic nodule. Repeat US in 3/17  ,  09/18 Showed the nodule to be stable. US in 09/19 showed the nodule to be stable with benign/spongiform  Appearance    No further follow-up recommended by the radiologist.     Although risk of cancer is low, will do US in 2 years ( 08/21) to document size stability. 3. Meningioma . S/p radiation. Follows with radiation oncology. Pituitary work-up in 2016 showed normal prolactin, IGF-1, FSH, LH      4. Obesity. Tried  contrave in 2017    Lost weight initially but then gained weight and stopped it. Doing low carb , high fat diet. Has lost significant weight    Continue life style changes.

## 2020-10-09 ENCOUNTER — OFFICE VISIT (OUTPATIENT)
Dept: PRIMARY CARE CLINIC | Age: 50
End: 2020-10-09
Payer: COMMERCIAL

## 2020-10-09 VITALS
TEMPERATURE: 97.2 F | HEART RATE: 81 BPM | DIASTOLIC BLOOD PRESSURE: 78 MMHG | OXYGEN SATURATION: 97 % | BODY MASS INDEX: 31.45 KG/M2 | WEIGHT: 213 LBS | SYSTOLIC BLOOD PRESSURE: 115 MMHG

## 2020-10-09 PROCEDURE — 99396 PREV VISIT EST AGE 40-64: CPT | Performed by: NURSE PRACTITIONER

## 2020-10-09 PROCEDURE — 36415 COLL VENOUS BLD VENIPUNCTURE: CPT | Performed by: NURSE PRACTITIONER

## 2020-10-09 RX ORDER — VITAMIN E 268 MG
400 CAPSULE ORAL DAILY
COMMUNITY

## 2020-10-09 ASSESSMENT — ENCOUNTER SYMPTOMS
BACK PAIN: 1
SHORTNESS OF BREATH: 0
NAUSEA: 0
DIARRHEA: 0
VOMITING: 0

## 2020-10-09 ASSESSMENT — PATIENT HEALTH QUESTIONNAIRE - PHQ9
2. FEELING DOWN, DEPRESSED OR HOPELESS: 0
SUM OF ALL RESPONSES TO PHQ9 QUESTIONS 1 & 2: 0
SUM OF ALL RESPONSES TO PHQ QUESTIONS 1-9: 0
1. LITTLE INTEREST OR PLEASURE IN DOING THINGS: 0
SUM OF ALL RESPONSES TO PHQ QUESTIONS 1-9: 0

## 2020-10-09 ASSESSMENT — ANXIETY QUESTIONNAIRES
1. FEELING NERVOUS, ANXIOUS, OR ON EDGE: 0-NOT AT ALL
GAD7 TOTAL SCORE: 0
2. NOT BEING ABLE TO STOP OR CONTROL WORRYING: 0-NOT AT ALL
3. WORRYING TOO MUCH ABOUT DIFFERENT THINGS: 0-NOT AT ALL
7. FEELING AFRAID AS IF SOMETHING AWFUL MIGHT HAPPEN: 0-NOT AT ALL
4. TROUBLE RELAXING: 0-NOT AT ALL
5. BEING SO RESTLESS THAT IT IS HARD TO SIT STILL: 0-NOT AT ALL
6. BECOMING EASILY ANNOYED OR IRRITABLE: 0-NOT AT ALL

## 2020-10-09 NOTE — PROGRESS NOTES
[address] [facility]  [address] [facility] [address]  [address] [place] [address] Dr Vanessa  YOB: 1970  Date of Service:  10/9/2020    Chief Complaint:   Aaron Hankins is a 54 y.o. female who presents for complete physical examination. Current concerns -none     HPI:   Jeramy Edwards needs CPE in order to complete her [employer] Caregiver Application. Pt has a PCP Dr. Max Gaffney however reports she has not seen him in some time as she is followed closely by Endocrine/Dr. Sasha Haynes,, Neurology/Dr. Paul Moya, Urology/[facility]. PMH of benign brain tumor s/p radiation 10 yrs ago, GERD, congenital kidney anomaly, DJD, and chronic back pain, subclinical hyperthyroidism, left thyroid nodule. She reports plantar fasciitis of her left foot, chronic low back pain that is treated by her Chiropractor. 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 HISTORY:  Patient's medications, allergies, past medical, and social histories were reviewed and updated as appropriate. CHART REVIEW    Allergies   Allergen Reactions    Other     Sulfa Antibiotics     Sulfur     Codeine     Pork-Derived Products Diarrhea     Vomiting. Pt can eat some pork products    Codeine Nausea And Vomiting    Sulfa Antibiotics Nausea And Vomiting     Current Outpatient Medications   Medication Sig Dispense Refill    vitamin E 400 UNIT capsule Take 400 Units by mouth daily      esomeprazole Magnesium (NEXIUM) 20 MG PACK Take 20 mg by mouth daily       Multiple Vitamins-Minerals (THERAPEUTIC MULTIVITAMIN-MINERALS) tablet Take 1 tablet by mouth daily      Omega-3 Fatty Acids (FISH OIL) 1000 MG CAPS Take 1,000 mg by mouth daily       No current facility-administered medications for this visit.       Past Medical History:   Diagnosis Date    Acid reflux     Anesthesia     SLOW TO WAKE UP    Brain tumor (benign) (HCC)     GERD (gastroesophageal reflux disease)     Kidney anomaly, congenital 2006    Kidney function abnormal     undeveloped kidney at birth (pt does not remeber which one)    Liver spot      Health Maintenance   Topic Date Due    DTaP/Tdap/Td vaccine (6 - Tdap) 10/27/1983    HIV screen  10/27/1987    Lipid screen  10/27/2012    Diabetes screen  10/27/2012    Cervical cancer screen  02/01/2019    Breast cancer screen  03/01/2020    Flu vaccine (1) 09/01/2020    TSH testing  09/14/2021    Hepatitis A vaccine  Aged Out    Hepatitis B vaccine  Aged Out    Hib vaccine  Aged Out    Meningococcal (ACWY) vaccine  Aged Out    Pneumococcal 0-64 years Vaccine  Aged Out     The ASCVD Risk score (Roberto Hernandez, et al., 2013) failed to calculate for the following reasons:    Cannot find a previous HDL lab    Cannot find a previous total cholesterol lab  Prior to Visit Medications    Medication Sig Taking?  Authorizing Provider   vitamin E 400 UNIT capsule Take 400 Units by mouth daily Yes Historical Provider, MD   esomeprazole Magnesium (NEXIUM) 20 MG PACK Take 20 mg by mouth daily  Yes Historical Provider, MD   Multiple Vitamins-Minerals (THERAPEUTIC MULTIVITAMIN-MINERALS) tablet Take 1 tablet by mouth daily Yes Historical Provider, MD   Omega-3 Fatty Acids (FISH OIL) 1000 MG CAPS Take 1,000 mg by mouth daily Yes Historical Provider, MD      Family History   Problem Relation Age of Onset    Cancer Mother         BREAST    Cancer Father         PROSTATE     Social History     Tobacco Use    Smoking status: Former Smoker     Packs/day: 0.10     Years: 2.00     Pack years: 0.20    Smokeless tobacco: Never Used    Tobacco comment: QUIT 20 YRS AGO   Substance Use Topics    Alcohol use: Yes     Comment: rare    Drug use: No      LAST LABS    No results found for: CHOL, LDLCALCNo results found for: HDLNo results found for: TRIG  Lab Results   Component Value Date    GLUCOSE 85 07/11/2011     Lab Results   Component Value Date     07/11/2011    K 4.0 07/11/2011    CREATININE 0.8 08/23/2018     Lab Results   Component Value Date    WBC 7.7 07/11/2011    HGB 14.7 07/11/2011    HCT 42.8 07/11/2011    MCV 91.0 07/11/2011     07/11/2011     No results found for: ALT, AST, GGT, ALKPHOS, BILITOT  TSH (uIU/mL)   Date Value   09/14/2020 0.43     No results found for: LABA1C    Self-breast exams: yes  Last PAP: 1.5 years,  normal  Hx abnormal PAP: no  Last mammogram:2019 , normal/ h/o bilateral cystic mastopathy  Last eye exam: 10 years, normal, needs readers  Exercise: working two jobs,  at [employer] and [employer]   Diet:  keto, since May has lost 50Lbs- discussed Keto diet/concerns d/t congenital kidney anomaly  Seatbelt use: 100    Review of Systems:  Review of Systems   Constitutional: Negative for activity change, appetite change, chills and fever. HENT: Negative. Eyes: Negative. Respiratory: Negative for shortness of breath. Cardiovascular: Negative for chest pain, palpitations and leg swelling. Gastrointestinal: Negative for diarrhea, nausea and vomiting. Endocrine: Negative. Genitourinary: Negative. Musculoskeletal: Positive for back pain (chronic). Reports left foot pain d/t PF   Skin: Negative for rash. Allergic/Immunologic: Negative. Neurological: Negative for weakness and headaches. Psychiatric/Behavioral: Negative. Objective:     PHYSICAL EXAM     /78 (Site: Left Upper Arm, Position: Sitting, Cuff Size: Large Adult)   Pulse 81   Temp 97.2 °F (36.2 °C)   Wt 213 lb (96.6 kg)   LMP 06/18/2011   SpO2 97%   BMI 31.45 kg/m²   BP Readings from Last 5 Encounters:   10/09/20 115/78   09/14/20 116/77   09/10/19 120/79   03/04/19 120/78   08/30/18 118/84     Wt Readings from Last 5 Encounters:   10/09/20 213 lb (96.6 kg)   09/14/20 209 lb 12.8 oz (95.2 kg)   01/27/20 249 lb (112.9 kg)   09/10/19 255 lb 9.6 oz (115.9 kg)   03/04/19 258 lb (117 kg)    Body mass index is 31.45 kg/m².     GENERAL:   · well-developed, well-nourished, alert, no distress. EYES:   · External findings: lids and lashes normal and conjunctivae and sclerae normal  · Eyes: no periorbital cellulitis. ENT:   · External nose and ears appear normal  · normal TM's and external ear canals both ears  · Pharynx: normal. Exudates: None  · Lips, mucosa, and tongue normal  · Hearing grossly normal.     NECK:   · Supple, symmetrical, trachea midline  · Thyroid not enlarged, symmetric, no tenderness/mass/nodules  LYMPH:  · no cervical nodes, no supraclavicular nodes  LUNGS:    · Breathing unlabored  · clear to auscultation bilaterally and good air movement  CARDIOVASC:   · regular rate and rhythm, S1, S2 normal. No murmur, click, rub or gallop  · Apical impulse normal  · LEGS:  Lower extremity edema: none    · No carotid bruits  ABDOMEN:   · Soft, non-tender, no masses  · No hepatosplenomegaly  · No hernias noted.   Exam limited by N/A  SKIN: warm and dry  · No rashes or suspicious lesions  · No nodules or induration  PSYCH:    · Alert and oriented  · Normal reasoning, insight good  · Facial expressions full, mood appropriate  · No memory disturbance noted  MUSCULOSKEL:    · Gait normal, assistive device: none  · No significant finger or nail findings  · Spine symmetric, no deformities, no kyphosis     CHP OPAL-7 10/9/2020   Feeling nervous, anxious, or on edge 0-Not at all   Not able to stop or control worrying 0-Not at all   Worrying too much about different things 0-Not at all   Trouble relaxing 0-Not at all   Being so restless that it's hard to sit still 0-Not at all   Becoming easily annoyed or irritable 0-Not at all   Feeling afraid as if something awful might happen 0-Not at all   OPAL-7 Total Score 0      PHQ-9  10/9/2020   Little interest or pleasure in doing things 0   Feeling down, depressed, or hopeless 0   PHQ-2 Score 0   PHQ-9 Total Score 0       Assessment/Plan  Pt appears well on exam no concerns for Psych/health  Diagnoses and all orders for this visit:    Annual physical exam  -     CBC Auto Differential  -     Comprehensive Metabolic Panel  -     Hemoglobin A1C  -     Lipid, Fasting  -     TSH with Reflex  -     Vitamin D 25 Hydroxy    -Labs to be completed on 10/12/20 as pt is not fasting,   -will forward chart and labs to PCP  -once review is complete will sign needed paper work/foster caregiver application    Patient Education:    Randy Macdonald on importance of healthy diet and regular exercise of at least 30 minutes on four or more days during the week. Counseled on skin safety, SPF 27 or higher prior to going outdoors and reapplication every twohours while outside.  Monitor moles for changes, report to provider if greater than 6 mm, color variations, asymmetry, redness, scales, and/or overlying skin changes  Counseled on safety, wear seatbelt, do not consumealcohol and drive or drive with anyone who has consumed alcohol  Counseled on importance of monthly self breast exams, perform on same time each month, monitor for newlumps/bumps/masses, tenderness, changes to size or contour, dimpling, nipple discharge, new nipple retraction, and overlying skin changes, report to provider

## 2020-10-09 NOTE — PATIENT INSTRUCTIONS
Come in fasting for needed lab work on Monday  Will have labs back by Wed and can then sign needed paper work  Will forward results to PCP Dr Celia Schmitz    Patient Education        Well Visit, Ages 25 to 48: Care Instructions  Your Care Instructions     Physical exams can help you stay healthy. Your doctor has checked your overall health and may have suggested ways to take good care of yourself. He or she also may have recommended tests. At home, you can help prevent illness with healthy eating, regular exercise, and other steps. Follow-up care is a key part of your treatment and safety. Be sure to make and go to all appointments, and call your doctor if you are having problems. It's also a good idea to know your test results and keep a list of the medicines you take. How can you care for yourself at home? · Reach and stay at a healthy weight. This will lower your risk for many problems, such as obesity, diabetes, heart disease, and high blood pressure. · Get at least 30 minutes of physical activity on most days of the week. Walking is a good choice. You also may want to do other activities, such as running, swimming, cycling, or playing tennis or team sports. Discuss any changes in your exercise program with your doctor. · Do not smoke or allow others to smoke around you. If you need help quitting, talk to your doctor about stop-smoking programs and medicines. These can increase your chances of quitting for good. · Talk to your doctor about whether you have any risk factors for sexually transmitted infections (STIs). Having one sex partner (who does not have STIs and does not have sex with anyone else) is a good way to avoid these infections. · Use birth control if you do not want to have children at this time. Talk with your doctor about the choices available and what might be best for you. · Protect your skin from too much sun.  When you're outdoors from 10 a.m. to 4 p.m., stay in the shade or cover up with clothing and a hat with a wide brim. Wear sunglasses that block UV rays. Even when it's cloudy, put broad-spectrum sunscreen (SPF 30 or higher) on any exposed skin. · See a dentist one or two times a year for checkups and to have your teeth cleaned. · Wear a seat belt in the car. Follow your doctor's advice about when to have certain tests. These tests can spot problems early. For everyone  · Cholesterol. Have the fat (cholesterol) in your blood tested after age 21. Your doctor will tell you how often to have this done based on your age, family history, or other things that can increase your risk for heart disease. · Blood pressure. Have your blood pressure checked during a routine doctor visit. Your doctor will tell you how often to check your blood pressure based on your age, your blood pressure results, and other factors. · Vision. Talk with your doctor about how often to have a glaucoma test.  · Diabetes. Ask your doctor whether you should have tests for diabetes. · Colon cancer. Your risk for colorectal cancer gets higher as you get older. Some experts say that adults should start regular screening at age 48 and stop at age 76. Others say to start before age 48 or continue after age 76. Talk with your doctor about your risk and when to start and stop screening. For women  · Breast exam and mammogram. Talk to your doctor about when you should have a clinical breast exam and a mammogram. Medical experts differ on whether and how often women under 50 should have these tests. Your doctor can help you decide what is right for you. · Cervical cancer screening test and pelvic exam. Begin with a Pap test at age 24. The test often is part of a pelvic exam. Starting at age 27, you may choose to have a Pap test, an HPV test, or both tests at the same time (called co-testing). Talk with your doctor about how often to have testing. · Tests for sexually transmitted infections (STIs).  Ask whether you should have tests for STIs. You may be at risk if you have sex with more than one person, especially if your partners do not wear condoms. For men  · Tests for sexually transmitted infections (STIs). Ask whether you should have tests for STIs. You may be at risk if you have sex with more than one person, especially if you do not wear a condom. · Testicular cancer exam. Ask your doctor whether you should check your testicles regularly. · Prostate exam. Talk to your doctor about whether you should have a blood test (called a PSA test) for prostate cancer. Experts differ on whether and when men should have this test. Some experts suggest it if you are older than 39 and are -American or have a father or brother who got prostate cancer when he was younger than 72. When should you call for help? Watch closely for changes in your health, and be sure to contact your doctor if you have any problems or symptoms that concern you. Where can you learn more? Go to https://[email or web address] org and sign in to your GoodRx account. Enter P072 in the PWRF box to learn more about \"Well Visit, Ages 25 to 48: Care Instructions. \"     If you do not have an account, please click on the \"Sign Up Now\" link. Current as of: May 27, 2020               Content Version: 12.6  © 4234-3650 [employer]. Care instructions adapted under license by [facility] ([facility]). If you have questions about a medical condition or this instruction, always ask your healthcare professional. [place] [address] any warranty or liability for your use of this information.

## 2020-10-12 PROBLEM — G93.89 BRAIN MASS: Status: ACTIVE | Noted: 2020-10-12

## 2020-10-12 LAB
A/G RATIO: 2.3 (ref 1.1–2.2)
ALBUMIN SERPL-MCNC: 4.6 G/DL (ref 3.4–5)
ALP BLD-CCNC: 81 U/L (ref 40–129)
ALT SERPL-CCNC: 14 U/L (ref 10–40)
ANION GAP SERPL CALCULATED.3IONS-SCNC: 10 MMOL/L (ref 3–16)
AST SERPL-CCNC: 14 U/L (ref 15–37)
BASOPHILS ABSOLUTE: 0.1 K/UL (ref 0–0.2)
BASOPHILS RELATIVE PERCENT: 0.9 %
BILIRUB SERPL-MCNC: 0.7 MG/DL (ref 0–1)
BUN BLDV-MCNC: 13 MG/DL (ref 7–20)
CALCIUM SERPL-MCNC: 9.7 MG/DL (ref 8.3–10.6)
CHLORIDE BLD-SCNC: 103 MMOL/L (ref 99–110)
CHOLESTEROL, FASTING: 159 MG/DL (ref 0–199)
CO2: 26 MMOL/L (ref 21–32)
CREAT SERPL-MCNC: 0.6 MG/DL (ref 0.6–1.1)
EOSINOPHILS ABSOLUTE: 0.1 K/UL (ref 0–0.6)
EOSINOPHILS RELATIVE PERCENT: 2.5 %
ESTIMATED AVERAGE GLUCOSE: 105.4 MG/DL
GFR AFRICAN AMERICAN: >60
GFR NON-AFRICAN AMERICAN: >60
GLOBULIN: 2 G/DL
GLUCOSE BLD-MCNC: 106 MG/DL (ref 70–99)
HBA1C MFR BLD: 5.3 %
HCT VFR BLD CALC: 42.8 % (ref 36–48)
HDLC SERPL-MCNC: 62 MG/DL (ref 40–60)
HEMOGLOBIN: 14.6 G/DL (ref 12–16)
LDL CHOLESTEROL CALCULATED: 83 MG/DL
LYMPHOCYTES ABSOLUTE: 1.9 K/UL (ref 1–5.1)
LYMPHOCYTES RELATIVE PERCENT: 33.2 %
MCH RBC QN AUTO: 29.8 PG (ref 26–34)
MCHC RBC AUTO-ENTMCNC: 34.1 G/DL (ref 31–36)
MCV RBC AUTO: 87.4 FL (ref 80–100)
MONOCYTES ABSOLUTE: 0.5 K/UL (ref 0–1.3)
MONOCYTES RELATIVE PERCENT: 7.9 %
NEUTROPHILS ABSOLUTE: 3.2 K/UL (ref 1.7–7.7)
NEUTROPHILS RELATIVE PERCENT: 55.5 %
PDW BLD-RTO: 13.7 % (ref 12.4–15.4)
PLATELET # BLD: 220 K/UL (ref 135–450)
PMV BLD AUTO: 8.8 FL (ref 5–10.5)
POTASSIUM SERPL-SCNC: 4.2 MMOL/L (ref 3.5–5.1)
RBC # BLD: 4.9 M/UL (ref 4–5.2)
SODIUM BLD-SCNC: 139 MMOL/L (ref 136–145)
T3 TOTAL: 1.4 NG/ML (ref 0.8–2)
T4 FREE: 1.2 NG/DL (ref 0.9–1.8)
TOTAL PROTEIN: 6.6 G/DL (ref 6.4–8.2)
TRIGLYCERIDE, FASTING: 72 MG/DL (ref 0–150)
TSH REFLEX: 0.24 UIU/ML (ref 0.27–4.2)
VITAMIN D 25-HYDROXY: 36.7 NG/ML
VLDLC SERPL CALC-MCNC: 14 MG/DL
WBC # BLD: 5.8 K/UL (ref 4–11)

## 2020-10-12 ASSESSMENT — ENCOUNTER SYMPTOMS
EYES NEGATIVE: 1
ALLERGIC/IMMUNOLOGIC NEGATIVE: 1

## 2020-12-19 ENCOUNTER — APPOINTMENT (OUTPATIENT)
Dept: GENERAL RADIOLOGY | Age: 50
End: 2020-12-19
Payer: COMMERCIAL

## 2020-12-19 ENCOUNTER — HOSPITAL ENCOUNTER (EMERGENCY)
Age: 50
Discharge: HOME | End: 2020-12-19
Attending: EMERGENCY MEDICINE
Payer: COMMERCIAL

## 2020-12-19 ENCOUNTER — APPOINTMENT (OUTPATIENT)
Dept: CT IMAGING | Age: 50
End: 2020-12-19
Payer: COMMERCIAL

## 2020-12-19 VITALS
TEMPERATURE: 98.6 F | OXYGEN SATURATION: 98 % | HEIGHT: 69 IN | RESPIRATION RATE: 18 BRPM | BODY MASS INDEX: 30.81 KG/M2 | HEART RATE: 69 BPM | WEIGHT: 208 LBS | DIASTOLIC BLOOD PRESSURE: 80 MMHG | SYSTOLIC BLOOD PRESSURE: 124 MMHG

## 2020-12-19 LAB
ALBUMIN SERPL-MCNC: 3.8 G/DL (ref 3.4–5)
ALP BLD-CCNC: 73 U/L (ref 40–129)
ALT SERPL-CCNC: 13 U/L (ref 10–40)
ANION GAP SERPL CALCULATED.3IONS-SCNC: 11 MMOL/L (ref 3–16)
AST SERPL-CCNC: 17 U/L (ref 15–37)
BACTERIA: ABNORMAL /HPF
BASOPHILS ABSOLUTE: 0 K/UL (ref 0–0.2)
BASOPHILS RELATIVE PERCENT: 0.5 %
BILIRUB SERPL-MCNC: 0.3 MG/DL (ref 0–1)
BILIRUBIN DIRECT: <0.2 MG/DL (ref 0–0.3)
BILIRUBIN URINE: NEGATIVE
BILIRUBIN, INDIRECT: NORMAL MG/DL (ref 0–1)
BLOOD, URINE: NEGATIVE
BUN BLDV-MCNC: 7 MG/DL (ref 7–20)
CALCIUM SERPL-MCNC: 8.9 MG/DL (ref 8.3–10.6)
CHLORIDE BLD-SCNC: 103 MMOL/L (ref 99–110)
CLARITY: CLEAR
CO2: 25 MMOL/L (ref 21–32)
COLOR: YELLOW
CREAT SERPL-MCNC: 0.7 MG/DL (ref 0.6–1.1)
EOSINOPHILS ABSOLUTE: 0 K/UL (ref 0–0.6)
EOSINOPHILS RELATIVE PERCENT: 0.5 %
EPITHELIAL CELLS, UA: ABNORMAL /HPF (ref 0–5)
GFR AFRICAN AMERICAN: >60
GFR NON-AFRICAN AMERICAN: >60
GLUCOSE BLD-MCNC: 116 MG/DL (ref 70–99)
GLUCOSE URINE: NEGATIVE MG/DL
HCG(URINE) PREGNANCY TEST: NEGATIVE
HCT VFR BLD CALC: 42.6 % (ref 36–48)
HEMOGLOBIN: 14.5 G/DL (ref 12–16)
KETONES, URINE: NEGATIVE MG/DL
LEUKOCYTE ESTERASE, URINE: ABNORMAL
LIPASE: 19 U/L (ref 13–60)
LYMPHOCYTES ABSOLUTE: 1.3 K/UL (ref 1–5.1)
LYMPHOCYTES RELATIVE PERCENT: 41 %
MAGNESIUM: 1.9 MG/DL (ref 1.8–2.4)
MCH RBC QN AUTO: 30 PG (ref 26–34)
MCHC RBC AUTO-ENTMCNC: 33.9 G/DL (ref 31–36)
MCV RBC AUTO: 88.3 FL (ref 80–100)
MICROSCOPIC EXAMINATION: YES
MONOCYTES ABSOLUTE: 0.6 K/UL (ref 0–1.3)
MONOCYTES RELATIVE PERCENT: 19.1 %
NEUTROPHILS ABSOLUTE: 1.3 K/UL (ref 1.7–7.7)
NEUTROPHILS RELATIVE PERCENT: 38.9 %
NITRITE, URINE: NEGATIVE
PDW BLD-RTO: 12.8 % (ref 12.4–15.4)
PH UA: 6.5 (ref 5–8)
PLATELET # BLD: 158 K/UL (ref 135–450)
PMV BLD AUTO: 8.1 FL (ref 5–10.5)
POTASSIUM REFLEX MAGNESIUM: 3.4 MMOL/L (ref 3.5–5.1)
PROTEIN UA: NEGATIVE MG/DL
RBC # BLD: 4.83 M/UL (ref 4–5.2)
RBC UA: ABNORMAL /HPF (ref 0–4)
SODIUM BLD-SCNC: 139 MMOL/L (ref 136–145)
SPECIFIC GRAVITY UA: 1.01 (ref 1–1.03)
TOTAL PROTEIN: 6.6 G/DL (ref 6.4–8.2)
URINE TYPE: ABNORMAL
UROBILINOGEN, URINE: 0.2 E.U./DL
WBC # BLD: 3.3 K/UL (ref 4–11)
WBC UA: ABNORMAL /HPF (ref 0–5)

## 2020-12-19 PROCEDURE — 71046 X-RAY EXAM CHEST 2 VIEWS: CPT

## 2020-12-19 PROCEDURE — 99283 EMERGENCY DEPT VISIT LOW MDM: CPT

## 2020-12-19 PROCEDURE — 80076 HEPATIC FUNCTION PANEL: CPT

## 2020-12-19 PROCEDURE — 6360000004 HC RX CONTRAST MEDICATION: Performed by: PHYSICIAN ASSISTANT

## 2020-12-19 PROCEDURE — 80048 BASIC METABOLIC PNL TOTAL CA: CPT

## 2020-12-19 PROCEDURE — 36415 COLL VENOUS BLD VENIPUNCTURE: CPT

## 2020-12-19 PROCEDURE — 84703 CHORIONIC GONADOTROPIN ASSAY: CPT

## 2020-12-19 PROCEDURE — 85025 COMPLETE CBC W/AUTO DIFF WBC: CPT

## 2020-12-19 PROCEDURE — 74177 CT ABD & PELVIS W/CONTRAST: CPT

## 2020-12-19 PROCEDURE — 96374 THER/PROPH/DIAG INJ IV PUSH: CPT

## 2020-12-19 PROCEDURE — 81001 URINALYSIS AUTO W/SCOPE: CPT

## 2020-12-19 PROCEDURE — 96375 TX/PRO/DX INJ NEW DRUG ADDON: CPT

## 2020-12-19 PROCEDURE — 83735 ASSAY OF MAGNESIUM: CPT

## 2020-12-19 PROCEDURE — 6360000002 HC RX W HCPCS: Performed by: PHYSICIAN ASSISTANT

## 2020-12-19 PROCEDURE — 2580000003 HC RX 258: Performed by: PHYSICIAN ASSISTANT

## 2020-12-19 PROCEDURE — 83690 ASSAY OF LIPASE: CPT

## 2020-12-19 RX ORDER — AMOXICILLIN 500 MG/1
1000 CAPSULE ORAL 3 TIMES DAILY
Qty: 42 CAPSULE | Refills: 0 | Status: SHIPPED | OUTPATIENT
Start: 2020-12-19 | End: 2020-12-26

## 2020-12-19 RX ORDER — AZITHROMYCIN 250 MG/1
250 TABLET, FILM COATED ORAL SEE ADMIN INSTRUCTIONS
Qty: 6 TABLET | Refills: 0 | Status: SHIPPED | OUTPATIENT
Start: 2020-12-19 | End: 2020-12-24

## 2020-12-19 RX ORDER — ONDANSETRON 2 MG/ML
4 INJECTION INTRAMUSCULAR; INTRAVENOUS ONCE
Status: COMPLETED | OUTPATIENT
Start: 2020-12-19 | End: 2020-12-19

## 2020-12-19 RX ORDER — MORPHINE SULFATE 4 MG/ML
4 INJECTION, SOLUTION INTRAMUSCULAR; INTRAVENOUS ONCE
Status: COMPLETED | OUTPATIENT
Start: 2020-12-19 | End: 2020-12-19

## 2020-12-19 RX ORDER — 0.9 % SODIUM CHLORIDE 0.9 %
1000 INTRAVENOUS SOLUTION INTRAVENOUS ONCE
Status: COMPLETED | OUTPATIENT
Start: 2020-12-19 | End: 2020-12-19

## 2020-12-19 RX ADMIN — SODIUM CHLORIDE 1000 ML: 9 INJECTION, SOLUTION INTRAVENOUS at 18:22

## 2020-12-19 RX ADMIN — ONDANSETRON 4 MG: 2 INJECTION INTRAMUSCULAR; INTRAVENOUS at 18:28

## 2020-12-19 RX ADMIN — MORPHINE SULFATE 4 MG: 4 INJECTION INTRAVENOUS at 18:29

## 2020-12-19 RX ADMIN — IOPAMIDOL 80 ML: 755 INJECTION, SOLUTION INTRAVENOUS at 18:31

## 2020-12-19 ASSESSMENT — PAIN SCALES - GENERAL
PAINLEVEL_OUTOF10: 8
PAINLEVEL_OUTOF10: 8

## 2020-12-19 ASSESSMENT — ENCOUNTER SYMPTOMS
ABDOMINAL PAIN: 1
DIARRHEA: 0
CONSTIPATION: 0
COUGH: 0
VOMITING: 0
SHORTNESS OF BREATH: 0
EYE REDNESS: 0
NAUSEA: 0

## 2020-12-19 ASSESSMENT — PAIN DESCRIPTION - PAIN TYPE: TYPE: ACUTE PAIN

## 2020-12-19 ASSESSMENT — PAIN DESCRIPTION - ORIENTATION: ORIENTATION: RIGHT

## 2020-12-19 ASSESSMENT — PAIN DESCRIPTION - LOCATION: LOCATION: ABDOMEN;FLANK

## 2020-12-19 NOTE — ED PROVIDER NOTES
[address] ENCOUNTER          PHYSICIAN ASSISTANT NOTE       Date of evaluation: 2020    Chief Complaint     Abdominal Pain and Flank Pain      History of Present Illness     Yvan Mejia is a 52 y.o. female with PMH of Meningioma s/p radiation 10 years ago, GERD, congenital kidney anomaly who presents with Abdominal pain. Patient reports constant, 8/10 sharp pain in her right mid abdomen since this morning. Pain does not radiate. It does seem worse with activity. She took advil about 4 hours PTA with some relief. She reports decreased in appetite. No fevers, chills, cough, shortness of breath, n/v, dysuria, hematuria, urinary frequency or urgency, diarrhea. Last BM normal around noon today. Patient reports hx of atrophic kidney and kidney stones within kidney but states she has never passed a stone. She is s/p cholecystectomy about 1.5 years ago and hysterectomy. Review of Systems     Review of Systems   Constitutional: Positive for appetite change. Negative for chills and fever. HENT: Negative for congestion. Eyes: Negative for redness. Respiratory: Negative for cough and shortness of breath. Cardiovascular: Negative for chest pain. Gastrointestinal: Positive for abdominal pain. Negative for constipation, diarrhea, nausea and vomiting. Genitourinary: Negative for difficulty urinating. Musculoskeletal: Negative for gait problem. Neurological: Negative for dizziness. Psychiatric/Behavioral: The patient is not nervous/anxious. Past Medical, Surgical, Family, and Social History     She has a past medical history of Acid reflux, Anesthesia, Brain tumor (benign) ([address] GERD (gastroesophageal reflux disease), Kidney anomaly, congenital, Kidney function abnormal, Kidney stone, Liver spot, and Thyroid disease. She has a past surgical history that includes partial hysterectomy (cervix not removed);  section; back surgery;  Tubal ligation; Hysterectomy (7/15/11); and Gallbladder surgery (09/2018). Her family history includes Cancer in her father and mother. She reports that she has quit smoking. She has a 0.20 pack-year smoking history. She has never used smokeless tobacco. She reports current alcohol use. She reports that she does not use drugs. Medications     Previous Medications    ESOMEPRAZOLE MAGNESIUM (NEXIUM) 20 MG PACK    Take 20 mg by mouth daily     MULTIPLE VITAMINS-MINERALS (THERAPEUTIC MULTIVITAMIN-MINERALS) TABLET    Take 1 tablet by mouth daily    OMEGA-3 FATTY ACIDS (FISH OIL) 1000 MG CAPS    Take 1,000 mg by mouth daily    VITAMIN E 400 UNIT CAPSULE    Take 400 Units by mouth daily       Allergies     She is allergic to other; sulfa antibiotics; sulfur; codeine; pork-derived products; codeine; and sulfa antibiotics. Physical Exam     INITIAL VITALS: BP: 124/80,Temp: 98.6 °F (37 °C), Pulse: 69, Resp: 18, SpO2: 98 %   Physical Exam  Vitals signs and nursing note reviewed. Constitutional:       General: She is not in acute distress. HENT:      Head: Normocephalic and atraumatic. Mouth/Throat:      Mouth: Mucous membranes are moist.      Pharynx: Oropharynx is clear. Eyes:      Extraocular Movements: Extraocular movements intact. Conjunctiva/sclera: Conjunctivae normal.   Neck:      Musculoskeletal: Neck supple. Cardiovascular:      Rate and Rhythm: Normal rate and regular rhythm. Pulmonary:      Effort: Pulmonary effort is normal. No respiratory distress. Breath sounds: Normal breath sounds. No wheezing, rhonchi or rales. Abdominal:      General: Bowel sounds are normal. There is no distension. Palpations: Abdomen is soft. Tenderness: There is abdominal tenderness in the right lower quadrant. There is no right CVA tenderness, left CVA tenderness, guarding or rebound. Musculoskeletal:         General: No deformity. Skin:     General: Skin is warm and dry.    Neurological:      Mental Status: She is alert and oriented to person, place, and time. Psychiatric:         Mood and Affect: Mood normal.         Behavior: Behavior normal.         Diagnostic Results     RADIOLOGY:  XR CHEST (2 VW)   Final Result     Airspace opacities within the right lower lobe which may be    inflammatory or infectious. CT ABDOMEN PELVIS W IV CONTRAST Additional Contrast? None   Final Result   Impression:   1. New masslike consolidation of the right lower lobe. Given the surrounding groundglass opacities in this is favored to represent atypical infection. Recommend clinical correlation and short-term interval follow-up with CT chest in 6 weeks. 2. Nonobstructing renal calculi. 3. Atrophy and scarring of the right kidney. 4. Hypoattenuating hepatic lesion stable and nonspecific but likely benign lesion such as hemangiomas. 5. Normal appendix. 6. Colonic diverticulosis.              LABS:   Results for orders placed or performed during the hospital encounter of 12/19/20   CBC Auto Differential   Result Value Ref Range    WBC 3.3 (L) 4.0 - 11.0 K/uL    RBC 4.83 4.00 - 5.20 M/uL    Hemoglobin 14.5 12.0 - 16.0 g/dL    Hematocrit 42.6 36.0 - 48.0 %    MCV 88.3 80.0 - 100.0 fL    MCH 30.0 26.0 - 34.0 pg    MCHC 33.9 31.0 - 36.0 g/dL    RDW 12.8 12.4 - 15.4 %    Platelets 901 347 - 447 K/uL    MPV 8.1 5.0 - 10.5 fL    Neutrophils % 38.9 %    Lymphocytes % 41.0 %    Monocytes % 19.1 %    Eosinophils % 0.5 %    Basophils % 0.5 %    Neutrophils Absolute 1.3 (L) 1.7 - 7.7 K/uL    Lymphocytes Absolute 1.3 1.0 - 5.1 K/uL    Monocytes Absolute 0.6 0.0 - 1.3 K/uL    Eosinophils Absolute 0.0 0.0 - 0.6 K/uL    Basophils Absolute 0.0 0.0 - 0.2 K/uL   Basic Metabolic Panel w/ Reflex to MG   Result Value Ref Range    Sodium 139 136 - 145 mmol/L    Potassium reflex Magnesium 3.4 (L) 3.5 - 5.1 mmol/L    Chloride 103 99 - 110 mmol/L    CO2 25 21 - 32 mmol/L    Anion Gap 11 3 - 16    Glucose 116 (H) 70 - 99 mg/dL    BUN 7 7 - 20 mg/dL    CREATININE 0.7 0.6 - 1.1 mg/dL    GFR Non-African American >60 >60    GFR African American >60 >60    Calcium 8.9 8.3 - 10.6 mg/dL   Hepatic Function Panel   Result Value Ref Range    Total Protein 6.6 6.4 - 8.2 g/dL    Alb 3.8 3.4 - 5.0 g/dL    Alkaline Phosphatase 73 40 - 129 U/L    ALT 13 10 - 40 U/L    AST 17 15 - 37 U/L    Total Bilirubin 0.3 0.0 - 1.0 mg/dL    Bilirubin, Direct <0.2 0.0 - 0.3 mg/dL    Bilirubin, Indirect see below 0.0 - 1.0 mg/dL   Lipase   Result Value Ref Range    Lipase 19.0 13.0 - 60.0 U/L   Urinalysis, reflex to microscopic   Result Value Ref Range    Color, UA Yellow Straw/Yellow    Clarity, UA Clear Clear    Glucose, Ur Negative Negative mg/dL    Bilirubin Urine Negative Negative    Ketones, Urine Negative Negative mg/dL    Specific Gravity, UA 1.010 1.005 - 1.030    Blood, Urine Negative Negative    pH, UA 6.5 5.0 - 8.0    Protein, UA Negative Negative mg/dL    Urobilinogen, Urine 0.2 <2.0 E.U./dL    Nitrite, Urine Negative Negative    Leukocyte Esterase, Urine TRACE (A) Negative    Microscopic Examination YES     Urine Type NotGiven    Pregnancy, Urine   Result Value Ref Range    HCG(Urine) Pregnancy Test Negative Detects HCG level >20 MIU/mL   Microscopic Urinalysis   Result Value Ref Range    WBC, UA 3-5 0 - 5 /HPF    RBC, UA None seen 0 - 4 /HPF    Epithelial Cells, UA 6-10 (A) 0 - 5 /HPF    Bacteria, UA 1+ (A) None Seen /HPF   Magnesium   Result Value Ref Range    Magnesium 1.90 1.80 - 2.40 mg/dL       RECENT VITALS:  BP: 124/80, Temp: 98.6 °F (37 °C), Pulse: 69,Resp: 18, SpO2: 98 %     Procedures         ED Course     Nursing Notes, Past Medical Hx, Past Surgical Hx, Social Hx, Allergies, and Family Hx were reviewed.     The patient was given the followingmedications:  Orders Placed This Encounter   Medications    0.9 % sodium chloride bolus    morphine injection 4 mg    ondansetron (ZOFRAN) injection 4 mg    iopamidol (ISOVUE-370) 76 % injection 80 mL    amoxicillin (AMOXIL) 500 MG capsule     Sig: Take 2 capsules by mouth 3 times daily for 7 days     Dispense:  42 capsule     Refill:  0    azithromycin (ZITHROMAX) 250 MG tablet     Sig: Take 1 tablet by mouth See Admin Instructions for 5 days 500mg on day 1 followed by 250mg on days 2 - 5     Dispense:  6 tablet     Refill:  0       CONSULTS:  None    MEDICAL DECISION MAKING / ASSESSMENT / Liana Russo is a 52 y.o. female with PMH of Meningioma who presents with Abdominal pain. Patient endorses right mid abdominal pain that has been constant since this morning. It is worse with activity. She endorses lack of appetite. Denies any other symptoms. Physical exam reveals a soft abdomen with tenderness in the right lower quadrant at McBurney's point. No right upper quadrant tenderness or flank tenderness. UA revealed no blood or evidence of infection. Urine hCG negative. IV access was established. 1 L bolus of normal saline was ordered. Labs including CBC, BMP, hepatic function panel, lipase were obtained. Labs remarkable for wbc 3.3, K 3.4, mg 1.9. CT abdomen pelvis with IV contrast revealed new mass like consolidation of the right lower lobe. Given surrounding ground glass opacities in this it is favored to represent atypical infection. No other acute findings in the abdomen/pelvis. CXR obtained revealed airspace opacities within the right lower lobe which may be inflammatory or infectious. I discussed above findings with the patient. We will plan to place patient on antibiotics for potential bacterial pneumonia. She refused COVID-19 testing. I have instructed the patient to follow-up closely with her primary care provider for repeat chest x-ray. She is otherwise stable for discharge home at this time with strict return precautions. This patient was also evaluated by the attending physician. All care plans were discussed and agreed upon. Clinical Impression     1.  Right sided abdominal pain 2. Pneumonia due to organism        Disposition     PATIENT REFERRED TO:  Elvira Lemus DO  [address] [place] [id number] [phone]    Schedule an appointment as soon as possible for a visit       [facility]  [address]  [phone]    If symptoms worsen      DISCHARGE MEDICATIONS:  New Prescriptions    AMOXICILLIN (AMOXIL) 500 MG CAPSULE    Take 2 capsules by mouth 3 times daily for 7 days    AZITHROMYCIN (ZITHROMAX) 250 MG TABLET    Take 1 tablet by mouth See Admin Instructions for 5 days 500mg on day 1 followed by 250mg on days 2 - 5        DISPOSITION  Discharge.        Hilton Sanders PA-C  12/19/20 [id number]

## 2020-12-19 NOTE — ED PROVIDER NOTES
ED Attending Attestation Note     Date of evaluation: 12/19/2020    This patient was seen by the advance practice provider. I have seen and examined the patient, agree with the workup, evaluation, management and diagnosis. The care plan has been discussed. My assessment reveals patient with right mid abdominal pain with tenderness in RLQ. CT obtained to look for appendicitis but ground glass opacity seen in right lung. May be c/w COVID but recommended test and patient declined. Recommended f/u outpatient CT. No identifiable cause of abdominal pain. Will send on antibiotics.      Herrera Perez MD  12/20/20 [id number]

## 2020-12-19 NOTE — ED TRIAGE NOTES
Pt arrived to ED with RUQ/R flank pain that started this morning. Denies N/V/D. History of kidney stones but states pain typically presents more in back.

## 2020-12-21 ENCOUNTER — CARE COORDINATION (OUTPATIENT)
Dept: CARE COORDINATION | Age: 50
End: 2020-12-21

## 2020-12-22 NOTE — CARE COORDINATION
Second outreach attempt regarding pt recent visit to the ED. Pt was unable to reach today; no further contact unless pt return call to [facility].

## 2021-10-06 ENCOUNTER — NURSE ONLY (OUTPATIENT)
Dept: PRIMARY CARE CLINIC | Age: 51
End: 2021-10-06
Payer: COMMERCIAL

## 2021-10-06 DIAGNOSIS — Z23 NEED FOR INFLUENZA VACCINATION: Primary | ICD-10-CM

## 2021-10-06 PROCEDURE — 90674 CCIIV4 VAC NO PRSV 0.5 ML IM: CPT | Performed by: PHYSICIAN ASSISTANT

## 2021-10-06 PROCEDURE — 90471 IMMUNIZATION ADMIN: CPT | Performed by: PHYSICIAN ASSISTANT

## 2022-04-26 ENCOUNTER — TELEPHONE (OUTPATIENT)
Dept: BARIATRICS/WEIGHT MGMT | Age: 52
End: 2022-04-26

## 2022-04-26 NOTE — TELEPHONE ENCOUNTER
Called as a new pt courtesy call - left message. Told patient to have new pt paperwork completely filled out, insurance card, and id and to arrive on time to appointment. If they didn't have the paperwork filled out and arrive on time may be rescheduled. Also stated if they didn't receive paperwork to let us know so we could get it to them another way. Left office number on message.

## 2022-04-27 ENCOUNTER — OFFICE VISIT (OUTPATIENT)
Dept: BARIATRICS/WEIGHT MGMT | Age: 52
End: 2022-04-27
Payer: COMMERCIAL

## 2022-04-27 VITALS
DIASTOLIC BLOOD PRESSURE: 76 MMHG | HEART RATE: 89 BPM | BODY MASS INDEX: 39.4 KG/M2 | SYSTOLIC BLOOD PRESSURE: 148 MMHG | WEIGHT: 260 LBS | HEIGHT: 68 IN

## 2022-04-27 DIAGNOSIS — K21.9 CHRONIC GERD: ICD-10-CM

## 2022-04-27 DIAGNOSIS — E66.01 MORBID OBESITY WITH BMI OF 40.0-44.9, ADULT (HCC): Primary | ICD-10-CM

## 2022-04-27 PROCEDURE — 99204 OFFICE O/P NEW MOD 45 MIN: CPT | Performed by: SURGERY

## 2022-04-27 NOTE — PROGRESS NOTES
[facility]) Physicians   General & Laparoscopic Surgery  [employer]      HPI:    Ronald Kolb is a very pleasant 54 y.o. obese female ,   Body mass index is 40.12 kg/m². And multiple medical problems who is presenting for weight loss surgery evaluation and consultation     Patient has been struggling for several years now with obesity. Patient feels the weight is an obstacle to achieve and perform things in daily living as well risk on health. Tries to diet, and exercise but can't keep the weight off. Patient tried other regimens, but with no sustainable weight loss. Patient  is very determined to lose weight and be healthy, and is interested in surgical weight loss to achieve this goal.    Otherwise patient denies any nausea, vomiting, fevers, chills, shortness of breath, chest pain, constipation or urinary symptoms.           Past Medical History:   Diagnosis Date    Acid reflux     Anesthesia     SLOW TO WAKE UP    Brain tumor (benign) ([facility] [address]     Chronic GERD     Kidney anomaly, congenital 2006    Kidney function abnormal     undeveloped kidney at birth (pt does not remeber which one)    Kidney stone     Liver spot     Thyroid disease      Past Surgical History:   Procedure Laterality Date    BACK SURGERY       SECTION      CHOLECYSTECTOMY      GALLBLADDER SURGERY  2018    HYSTERECTOMY  7/15/11    laproscopic supracervical hysterectomy    PARTIAL HYSTERECTOMY      TUBAL LIGATION       Family History   Problem Relation Age of Onset    Cancer Mother         BREAST    Kidney Disease Mother     Cancer Father         PROSTATE    Stroke Father      Social History     Tobacco Use    Smoking status: Former Smoker     Packs/day: 0.10     Years: 2.00     Pack years: 0.20    Smokeless tobacco: Never Used    Tobacco comment: QUIT 20 YRS AGO   Substance Use Topics    Alcohol use: Yes     Comment: rare     I counseled the patient on the importance of not smoking and risks of ETOH. Allergies   Allergen Reactions    Elemental Sulfur     Other     Sulfa Antibiotics     Codeine     Pork-Derived Products Diarrhea     Vomiting. Pt can eat some pork products    Codeine Nausea And Vomiting    Sulfa Antibiotics Nausea And Vomiting     Vitals:    04/27/22 0922   BP: (!) 148/76   Pulse: 89   Weight: 260 lb (117.9 kg)   Height: 5' 7.5\" (1.715 m)       Body mass index is 40.12 kg/m². Current Outpatient Medications:     vitamin E 400 UNIT capsule, Take 400 Units by mouth daily, Disp: , Rfl:     esomeprazole Magnesium (NEXIUM) 20 MG PACK, Take 20 mg by mouth daily , Disp: , Rfl:     Multiple Vitamins-Minerals (THERAPEUTIC MULTIVITAMIN-MINERALS) tablet, Take 1 tablet by mouth daily, Disp: , Rfl:     Omega-3 Fatty Acids (FISH OIL) 1000 MG CAPS, Take 1,000 mg by mouth daily, Disp: , Rfl:       Review of Systems - History obtained from the patient  General ROS: negative  Psychological ROS: negative  Ophthalmic ROS: negative  Neurological ROS: negative  ENT ROS: negative  Allergy and Immunology ROS: negative  Hematological and Lymphatic ROS: negative  Endocrine ROS: negative  Respiratory ROS: negative  Cardiovascular ROS: negative  Gastrointestinal ROS:negative  Genito-Urinary ROS: negative  Musculoskeletal ROS: negative   Skin ROS: negative    Physical Exam   Constitutional: Patient is oriented to person, place, and time. Vital signs are normal. Patient  appears well-developed and well-nourished. Patient  is active and cooperative. Non-toxic appearance. No distress. HENT:   Head: Normocephalic and atraumatic. Head is without laceration. Right Ear: External ear normal. No lacerations. No drainage, swelling or tenderness. Left Ear: External ear normal. No lacerations. No drainage, swelling or tenderness. Nose: Nose normal. No nose lacerations or nasal deformity.    Mouth/Throat: Uvula is midline, oropharynx is clear and moist and mucous membranes are normal. No oropharyngeal exudate. Eyes: Conjunctivae, EOM and lids are normal. Pupils are equal, round, and reactive to light. Right eye exhibits no discharge. No foreign body present in the right eye. Left eye exhibits no discharge. No foreign body present in the left eye. No scleral icterus. Neck: Trachea normal and normal range of motion. Neck supple. No JVD present. No tracheal tenderness present. Carotid bruit is not present. No rigidity. No tracheal deviation and no edema present. No thyromegaly present. Cardiovascular: Normal rate, regular rhythm, normal heart sounds, intact distal pulses and normal pulses. Pulmonary/Chest: Effort normal and breath sounds normal. No stridor. No respiratory distress. Patient  has no wheezes. Patient has no rales. Patient exhibits no tenderness and no crepitus. Abdominal: Soft. Normal appearance and bowel sounds are normal. Patient exhibits no distension, no abdominal bruit, no ascites and no mass. There is no hepatosplenomegaly. There is no tenderness. There is no rigidity, no rebound, no guarding and no CVA tenderness. No hernia. Hernia confirmed negative in the ventral area. Musculoskeletal: Normal range of motion. Patient exhibits no edema or tenderness. Lymphadenopathy:        Head (right side): No submental, no submandibular, no preauricular, no posterior auricular and no occipital adenopathy present. Head (left side): No submental, no submandibular, no preauricular, no posterior auricular and no occipital adenopathy present. Patient  has no cervical adenopathy. Right: No supraclavicular adenopathy present. Left: No supraclavicular adenopathy present. Neurological: Patient is alert and oriented to person, place, and time. Patient has normal strength. Coordination and gait normal. GCS eye subscore is 4. GCS verbal subscore is 5. GCS motor subscore is 6. Skin: Skin is warm and dry. No abrasion and no rash noted. Patient  is not diaphoretic.  No cyanosis or erythema. Psychiatric: Patient has a normal mood and affect. speech is normal and behavior is normal. Cognition and memory are normal.             A/P  Ramy Morton is a very pleasant 54 y.o. female with Obesity,  Body mass index is 40.12 kg/m². and multiple obesity related co-morbidities. Ramy Morton is very motivated to lose weight and being more healthy. We discussed how her weight affects her overall health including:  Darnell was seen today for bariatric, initial visit. Diagnoses and all orders for this visit:    Morbid obesity with BMI of 40.0-44.9, adult ([facility] [address]  -     CBC with Auto Differential; Future  -     Comprehensive Metabolic Panel; Future  -     Hemoglobin A1C; Future  -     Iron and TIBC; Future  -     Lipid Panel; Future  -     TSH with Reflex; Future  -     Vitamin B12 & Folate; Future  -     Vitamin D 25 Hydroxy; Future    Chronic GERD  -     CBC with Auto Differential; Future  -     Comprehensive Metabolic Panel; Future  -     Hemoglobin A1C; Future  -     Iron and TIBC; Future  -     Lipid Panel; Future  -     TSH with Reflex; Future  -     Vitamin B12 & Folate; Future  -     Vitamin D 25 Hydroxy; Future      The patient underwent 30 minutes of dietary counseling. I have reviewed, discussed and agree with the dietary plan. Medical weight loss and different surgical options were discussed in details with patient. Ramy Morton is interested in surgical weight loss. Patient is interested in Laparoscopic Sleeve Gastrectomy, which I believe is an excellent option for the patient. We will proceed with pre-operative work up labs and studies. Will also petition patient's  insurance for approval for this procedure. Patient received dietary handouts and education. Patient advised that its their responsibility to follow up for studies and/or labs ordered today.    Also discussed in details the importance of follow up, as well following the recommendations and completing the whole program to improve outcomes when it comes to healthier lifestyle as well weight loss. Patient also advised about risks and benefits being on a strict dietary regimen as well using supplements. Patient agrees and wants to proceed with weight loss planning     Labs ordered. Psych Evaluation. H-pylori   EGD  Support Group. PCP Letter. Weight History    F/U in 4 weeks. Patient advised that its their responsibility to follow up for studies and/or labs ordered today.

## 2022-04-27 NOTE — PROGRESS NOTES
Neil Estrada is a 54 y.o. female with a date of birth of 1970. Vitals:    04/27/22 0922   BP: (!) 148/76   Pulse: 89    BMI: Body mass index is 39.5 kg/m². Obesity Classification: Class II    Weight History: Wt Readings from Last 3 Encounters:   04/27/22 256 lb (116.1 kg)   12/19/20 208 lb (94.3 kg)   10/09/20 213 lb (96.6 kg)       Patient's lowest adult weight was 180 lbs at age 38. Patient's highest adult weight was 272 lbs at age 47. Patient has participated in the following weight loss programs: Atkins Diet, Power Assure [facility], [place] Diet, low fat, herbalife, low carb, calorie restriction and physician supervised. Patient has participated in meal replacement/liquid diets. Slimfast  Patient has participated in weight loss medications. Luis Enrique, Adipex, Contrave    Patient is lactose intolerant. Patient does not have Muslim/cultural food preferences. Patient does have food allergies for pork and sensitive to citrus. Patient does tolerate artificial sweeteners. 24 hour recall/food frequency chart: Works as [employer]  7 Am-3 PM +  3 PM- 4 or 8 PM 5X/week. Limited hours in Summer 2 days/week. Breakfast: Brown sugar oatmeal + coffee  Snack: None  Lunch: Salad w/ egg/broccoli/cheese   Snack: None  Dinner: Spaghetti w/ meat sauce  Snack: None  Drinks throughout the day: 3 cups Coffee w/ creamer, diet snapple, detox tea, water  Do you drink alcohol? Yes. How often/how much alcohol do you drink: 3-4 Mixed drinks per year. Patient does not meet the criteria for binge eating disorder. Patient does not have grazing. Patient does not have night eating. Patient does have a history of emotional eating or eating out of boredom. Surgery  Patient does feel confident in her ability to make these changes. The patient's expectations of post-surgical eating habits are realisitic. Patient states she does understand the consequences of not complying with post-op food guidelines. Patient states she does understands the long term changes in food intake that will be necessary for all occasions after surgery for the rest of her life. Patient is deemed nutritionally appropriate to proceed. Goals  Weight: Not sure of a number, maybe 175 lbs  Health Improvement: decrease back pain, improve reflux      Assessment  Nutritional Needs: RMR=(9.99 x 116.1) + (6.25 x 171.5) - (4.92 x 54 y.o.) -161= 1830 kcal x 1.3 (sedentary activity factor)= 2379 kcal - 1000 (for 2 lb weight loss/week)= 1379 kcal.    Plan  Plan/Recommendations: Start presurgical guidelines. Goals:   -Eat 4-5 times daily  -Avoid high fat and high sugar foods  -Include protein with all meals and snacks  -Avoid carbonation and caffeine  -Avoid calorie containing beverages  -Increase physical activity as tolerated  -Start tracking with MyFitnessPal    PES Statement:  Overweight/Obesity related to lack of exercise, sedentary lifestyle, unhealthy eating habits, and unsuccessful diet attempts as evidenced by BMI. Body mass index is 39.5 kg/m². Will follow up as necessary.     Oumar Crouch, CODI, LD

## 2022-05-25 ENCOUNTER — OFFICE VISIT (OUTPATIENT)
Dept: BARIATRICS/WEIGHT MGMT | Age: 52
End: 2022-05-25
Payer: COMMERCIAL

## 2022-05-25 VITALS — HEIGHT: 68 IN | BODY MASS INDEX: 38.8 KG/M2 | WEIGHT: 256 LBS

## 2022-05-25 DIAGNOSIS — E66.01 MORBID OBESITY WITH BMI OF 40.0-44.9, ADULT (HCC): ICD-10-CM

## 2022-05-25 DIAGNOSIS — E66.01 MORBID OBESITY WITH BMI OF 40.0-44.9, ADULT (HCC): Primary | ICD-10-CM

## 2022-05-25 DIAGNOSIS — K21.9 CHRONIC GERD: ICD-10-CM

## 2022-05-25 LAB
A/G RATIO: 1.7 (ref 1.1–2.2)
ALBUMIN SERPL-MCNC: 4.5 G/DL (ref 3.4–5)
ALP BLD-CCNC: 100 U/L (ref 40–129)
ALT SERPL-CCNC: 42 U/L (ref 10–40)
ANION GAP SERPL CALCULATED.3IONS-SCNC: 15 MMOL/L (ref 3–16)
AST SERPL-CCNC: 28 U/L (ref 15–37)
BASOPHILS ABSOLUTE: 0 K/UL (ref 0–0.2)
BASOPHILS RELATIVE PERCENT: 0.8 %
BILIRUB SERPL-MCNC: 0.5 MG/DL (ref 0–1)
BUN BLDV-MCNC: 9 MG/DL (ref 7–20)
CALCIUM SERPL-MCNC: 9.4 MG/DL (ref 8.3–10.6)
CHLORIDE BLD-SCNC: 102 MMOL/L (ref 99–110)
CHOLESTEROL, TOTAL: 184 MG/DL (ref 0–199)
CO2: 25 MMOL/L (ref 21–32)
CREAT SERPL-MCNC: 0.7 MG/DL (ref 0.6–1.1)
EOSINOPHILS ABSOLUTE: 0.2 K/UL (ref 0–0.6)
EOSINOPHILS RELATIVE PERCENT: 3.6 %
FOLATE: >20 NG/ML (ref 4.78–24.2)
GFR AFRICAN AMERICAN: >60
GFR NON-AFRICAN AMERICAN: >60
GLUCOSE BLD-MCNC: 101 MG/DL (ref 70–99)
HCT VFR BLD CALC: 42.9 % (ref 36–48)
HDLC SERPL-MCNC: 51 MG/DL (ref 40–60)
HEMOGLOBIN: 14.4 G/DL (ref 12–16)
IRON SATURATION: 36 % (ref 15–50)
IRON: 105 UG/DL (ref 37–145)
LDL CHOLESTEROL CALCULATED: 107 MG/DL
LYMPHOCYTES ABSOLUTE: 2 K/UL (ref 1–5.1)
LYMPHOCYTES RELATIVE PERCENT: 37.1 %
MCH RBC QN AUTO: 30.3 PG (ref 26–34)
MCHC RBC AUTO-ENTMCNC: 33.6 G/DL (ref 31–36)
MCV RBC AUTO: 90 FL (ref 80–100)
MONOCYTES ABSOLUTE: 0.4 K/UL (ref 0–1.3)
MONOCYTES RELATIVE PERCENT: 7.7 %
NEUTROPHILS ABSOLUTE: 2.8 K/UL (ref 1.7–7.7)
NEUTROPHILS RELATIVE PERCENT: 50.8 %
PDW BLD-RTO: 14 % (ref 12.4–15.4)
PLATELET # BLD: 185 K/UL (ref 135–450)
PMV BLD AUTO: 8.8 FL (ref 5–10.5)
POTASSIUM SERPL-SCNC: 3.9 MMOL/L (ref 3.5–5.1)
RBC # BLD: 4.77 M/UL (ref 4–5.2)
SODIUM BLD-SCNC: 142 MMOL/L (ref 136–145)
TOTAL IRON BINDING CAPACITY: 290 UG/DL (ref 260–445)
TOTAL PROTEIN: 7.1 G/DL (ref 6.4–8.2)
TRIGL SERPL-MCNC: 129 MG/DL (ref 0–150)
TSH REFLEX: 1.06 UIU/ML (ref 0.27–4.2)
VITAMIN B-12: 730 PG/ML (ref 211–911)
VITAMIN D 25-HYDROXY: 28.4 NG/ML
VLDLC SERPL CALC-MCNC: 26 MG/DL
WBC # BLD: 5.5 K/UL (ref 4–11)

## 2022-05-25 PROCEDURE — 99213 OFFICE O/P EST LOW 20 MIN: CPT | Performed by: SURGERY

## 2022-05-25 NOTE — PROGRESS NOTES
Fidel Zamudio lost 4 lbs over past ~month. Is pt eating at least 4 times everyday? yes    B-oatmeal w/ fruit  L- salad w/ chicken OR egg  S- peanuts OR cheese  D- chicken w/ green beans & salad    Is pt eating a lean protein source with all meals and snacks? yes    Has pt decreased their portions using the plate method? yes - being mindful    Is pt choosing low fat/sugar free options? yes    Is pt drinking at least 64 oz of clear liquids everyday? very close - water / herbal tea w/ stevia    Has pt stopped drinking carbonation, caffeinated, and sugar sweetened beverages? 1/2 caffe coffee in AM    Has pt sampled Unjury and/or Nectar protein? discussed, to try    Has pt attended a support group?  Scheduled     Participating in intentional exercise? was playing tennis daily / currenlty walking 5x/wk for 20-30 min    Plan/Recommendations:   - Eliminate caffeine  - Try protein powder  - Complete Support Group    Handouts: none    Kanwal Glaser RD, LD

## 2022-05-25 NOTE — PATIENT INSTRUCTIONS
Patient received dietary handouts and education.     Plan/Recommendations:   - Eliminate caffeine  - Try protein powder  - Complete Support Group

## 2022-05-26 LAB
ESTIMATED AVERAGE GLUCOSE: 105.4 MG/DL
HBA1C MFR BLD: 5.3 %

## 2022-06-09 ENCOUNTER — OFFICE VISIT (OUTPATIENT)
Dept: FAMILY MEDICINE CLINIC | Age: 52
End: 2022-06-09
Payer: COMMERCIAL

## 2022-06-09 VITALS
WEIGHT: 256 LBS | TEMPERATURE: 96.9 F | HEART RATE: 75 BPM | BODY MASS INDEX: 38.8 KG/M2 | DIASTOLIC BLOOD PRESSURE: 85 MMHG | HEIGHT: 68 IN | SYSTOLIC BLOOD PRESSURE: 122 MMHG | OXYGEN SATURATION: 98 %

## 2022-06-09 DIAGNOSIS — K21.9 GASTROESOPHAGEAL REFLUX DISEASE, UNSPECIFIED WHETHER ESOPHAGITIS PRESENT: ICD-10-CM

## 2022-06-09 DIAGNOSIS — Z12.11 SCREENING FOR COLON CANCER: Primary | ICD-10-CM

## 2022-06-09 DIAGNOSIS — E66.01 CLASS 3 SEVERE OBESITY DUE TO EXCESS CALORIES WITH SERIOUS COMORBIDITY AND BODY MASS INDEX (BMI) OF 45.0 TO 49.9 IN ADULT (HCC): ICD-10-CM

## 2022-06-09 PROCEDURE — 99203 OFFICE O/P NEW LOW 30 MIN: CPT | Performed by: FAMILY MEDICINE

## 2022-06-09 SDOH — ECONOMIC STABILITY: FOOD INSECURITY: WITHIN THE PAST 12 MONTHS, YOU WORRIED THAT YOUR FOOD WOULD RUN OUT BEFORE YOU GOT MONEY TO BUY MORE.: NEVER TRUE

## 2022-06-09 SDOH — ECONOMIC STABILITY: FOOD INSECURITY: WITHIN THE PAST 12 MONTHS, THE FOOD YOU BOUGHT JUST DIDN'T LAST AND YOU DIDN'T HAVE MONEY TO GET MORE.: NEVER TRUE

## 2022-06-09 ASSESSMENT — PATIENT HEALTH QUESTIONNAIRE - PHQ9
1. LITTLE INTEREST OR PLEASURE IN DOING THINGS: 0
SUM OF ALL RESPONSES TO PHQ QUESTIONS 1-9: 0
SUM OF ALL RESPONSES TO PHQ9 QUESTIONS 1 & 2: 0
SUM OF ALL RESPONSES TO PHQ QUESTIONS 1-9: 0
2. FEELING DOWN, DEPRESSED OR HOPELESS: 0

## 2022-06-09 ASSESSMENT — ENCOUNTER SYMPTOMS
SHORTNESS OF BREATH: 0
WHEEZING: 0
BACK PAIN: 0
CHEST TIGHTNESS: 0
STRIDOR: 0
ABDOMINAL PAIN: 0
COUGH: 0
CHOKING: 0

## 2022-06-09 ASSESSMENT — SOCIAL DETERMINANTS OF HEALTH (SDOH): HOW HARD IS IT FOR YOU TO PAY FOR THE VERY BASICS LIKE FOOD, HOUSING, MEDICAL CARE, AND HEATING?: NOT HARD AT ALL

## 2022-06-09 NOTE — LETTER
To Whom It May Concern:       I am sending this letter on behalf of Earnest Ly who is here for Pre-approval for Bariatric Surgery; specifically the Laparoscopic Sleeve Gastrectomy. The Procedure will be Outpatient. Earnest Ly is 54 y.o. (: 1970),  Weight: 256 lb (116.1 kg) and is Height: 5' 8\" (172.7 cm) tall. That gives her a Body Mass Index of Body mass index is 38.92 kg/m². Galen Lay Along with Morbid Obesity, the patient has a history of GERD, Thyroid Disease, Atrophic Kidney and a Brain Mass. The patient has made numerous attempts at dieting and exercise program, and has failed to maintain any sustained weight loss. I feel this patient would benefit form weight loss surgery because of the struggles with maintaining a healthy weight. Also the medical conditions will become life threatening if she hinton not get help getting her weight under control. I appreciate your consideration for approval. Please feel free to contact me for any further refills.       Sincerely,

## 2022-06-09 NOTE — PROGRESS NOTES
Subjective:      Patient ID: Donita Kaufman is a 54 y.o. female. LULY Silverio is here to discuss bariatric surgery. She is seeing Dr. Unruly Handley at [facility] and is considering bariatric surgery. She has GERD and takes Nexium 20. She has a history of thyroid disease , kidney stones and a brain mass. Review of Systems   Constitutional: Negative for activity change, appetite change, chills, diaphoresis, fatigue, fever and unexpected weight change. Respiratory: Negative for cough, choking, chest tightness, shortness of breath, wheezing and stridor. Cardiovascular: Negative for chest pain, palpitations and leg swelling. Gastrointestinal: Negative for abdominal pain. Genitourinary: Negative for difficulty urinating. Musculoskeletal: Negative for arthralgias and back pain. Skin: Negative for rash. Neurological: Negative for dizziness. Objective:   Physical Exam  Vitals and nursing note reviewed. Constitutional:       Appearance: She is well-developed. HENT:      Head: Normocephalic and atraumatic. Right Ear: External ear normal.      Left Ear: External ear normal.      Nose: Nose normal.   Eyes:      Conjunctiva/sclera: Conjunctivae normal.      Pupils: Pupils are equal, round, and reactive to light. Neck:      Thyroid: No thyromegaly. Vascular: No JVD. Trachea: No tracheal deviation. Cardiovascular:      Rate and Rhythm: Normal rate and regular rhythm. Heart sounds: Normal heart sounds. No murmur heard. No friction rub. No gallop. Pulmonary:      Effort: Pulmonary effort is normal. No respiratory distress. Breath sounds: Normal breath sounds. No stridor. No wheezing or rales. Chest:      Chest wall: No tenderness. Abdominal:      General: Bowel sounds are normal. There is no distension. Palpations: Abdomen is soft. There is no mass. Tenderness: There is no abdominal tenderness. There is no guarding or rebound.    Musculoskeletal:         General: No tenderness. Normal range of motion. Cervical back: Normal range of motion and neck supple. Lymphadenopathy:      Cervical: No cervical adenopathy. Skin:     General: Skin is warm and dry. Coloration: Skin is not pale. Findings: No erythema or rash. Neurological:      Mental Status: She is alert and oriented to person, place, and time. Cranial Nerves: No cranial nerve deficit. Motor: No abnormal muscle tone. Coordination: Coordination normal.      Deep Tendon Reflexes: Reflexes are normal and symmetric. Reflexes normal.         Assessment / Plan:         1. Screening for colon cancer-ordered colonoscopy  - COLONOSCOPY W/ OR W/O BIOPSY    2. Class 3 severe obesity due to excess calories with serious comorbidity and body mass index (BMI) of 45.0 to 49.9 in adult (HCC)-discussed. Recommend follow-up with bariatric surgery      3. Gastroesophageal reflux disease, unspecified whether esophagitis present-responding to use omeprazole 20.   Continue

## 2022-06-10 ENCOUNTER — ANESTHESIA EVENT (OUTPATIENT)
Dept: ENDOSCOPY | Age: 52
End: 2022-06-10
Payer: COMMERCIAL

## 2022-06-13 ENCOUNTER — HOSPITAL ENCOUNTER (OUTPATIENT)
Age: 52
Setting detail: OUTPATIENT SURGERY
Discharge: HOME | End: 2022-06-13
Attending: SURGERY | Admitting: SURGERY
Payer: COMMERCIAL

## 2022-06-13 ENCOUNTER — ANESTHESIA (OUTPATIENT)
Dept: ENDOSCOPY | Age: 52
End: 2022-06-13
Payer: COMMERCIAL

## 2022-06-13 VITALS
BODY MASS INDEX: 38.8 KG/M2 | OXYGEN SATURATION: 97 % | TEMPERATURE: 96.3 F | DIASTOLIC BLOOD PRESSURE: 70 MMHG | SYSTOLIC BLOOD PRESSURE: 123 MMHG | RESPIRATION RATE: 16 BRPM | HEIGHT: 68 IN | HEART RATE: 50 BPM | WEIGHT: 256 LBS

## 2022-06-13 DIAGNOSIS — K21.9 CHRONIC GERD: ICD-10-CM

## 2022-06-13 PROCEDURE — 88305 TISSUE EXAM BY PATHOLOGIST: CPT

## 2022-06-13 PROCEDURE — 7100000011 HC PHASE II RECOVERY - ADDTL 15 MIN: Performed by: SURGERY

## 2022-06-13 PROCEDURE — 43239 EGD BIOPSY SINGLE/MULTIPLE: CPT | Performed by: SURGERY

## 2022-06-13 PROCEDURE — 7100000010 HC PHASE II RECOVERY - FIRST 15 MIN: Performed by: SURGERY

## 2022-06-13 PROCEDURE — 3700000000 HC ANESTHESIA ATTENDED CARE: Performed by: SURGERY

## 2022-06-13 PROCEDURE — 6360000002 HC RX W HCPCS: Performed by: NURSE ANESTHETIST, CERTIFIED REGISTERED

## 2022-06-13 PROCEDURE — 3700000001 HC ADD 15 MINUTES (ANESTHESIA): Performed by: SURGERY

## 2022-06-13 PROCEDURE — 2580000003 HC RX 258: Performed by: ANESTHESIOLOGY

## 2022-06-13 PROCEDURE — 2709999900 HC NON-CHARGEABLE SUPPLY: Performed by: SURGERY

## 2022-06-13 PROCEDURE — 2500000003 HC RX 250 WO HCPCS: Performed by: NURSE ANESTHETIST, CERTIFIED REGISTERED

## 2022-06-13 PROCEDURE — 3609012400 HC EGD TRANSORAL BIOPSY SINGLE/MULTIPLE: Performed by: SURGERY

## 2022-06-13 PROCEDURE — 43251 EGD REMOVE LESION SNARE: CPT | Performed by: SURGERY

## 2022-06-13 RX ORDER — SODIUM CHLORIDE, SODIUM LACTATE, POTASSIUM CHLORIDE, CALCIUM CHLORIDE 600; 310; 30; 20 MG/100ML; MG/100ML; MG/100ML; MG/100ML
INJECTION, SOLUTION INTRAVENOUS CONTINUOUS
Status: DISCONTINUED | OUTPATIENT
Start: 2022-06-13 | End: 2022-06-13 | Stop reason: HOSPADM

## 2022-06-13 RX ORDER — GLYCOPYRROLATE 0.2 MG/ML
INJECTION INTRAMUSCULAR; INTRAVENOUS PRN
Status: DISCONTINUED | OUTPATIENT
Start: 2022-06-13 | End: 2022-06-13 | Stop reason: SDUPTHER

## 2022-06-13 RX ORDER — SODIUM CHLORIDE 9 MG/ML
INJECTION, SOLUTION INTRAVENOUS PRN
Status: DISCONTINUED | OUTPATIENT
Start: 2022-06-13 | End: 2022-06-13 | Stop reason: HOSPADM

## 2022-06-13 RX ORDER — PROPOFOL 10 MG/ML
INJECTION, EMULSION INTRAVENOUS PRN
Status: DISCONTINUED | OUTPATIENT
Start: 2022-06-13 | End: 2022-06-13 | Stop reason: SDUPTHER

## 2022-06-13 RX ORDER — SODIUM CHLORIDE 0.9 % (FLUSH) 0.9 %
5-40 SYRINGE (ML) INJECTION EVERY 12 HOURS SCHEDULED
Status: DISCONTINUED | OUTPATIENT
Start: 2022-06-13 | End: 2022-06-13 | Stop reason: HOSPADM

## 2022-06-13 RX ORDER — MELOXICAM 15 MG/1
TABLET ORAL
COMMUNITY
Start: 2022-06-10 | End: 2022-07-22

## 2022-06-13 RX ORDER — LIDOCAINE HYDROCHLORIDE 20 MG/ML
INJECTION, SOLUTION INTRAVENOUS PRN
Status: DISCONTINUED | OUTPATIENT
Start: 2022-06-13 | End: 2022-06-13 | Stop reason: SDUPTHER

## 2022-06-13 RX ORDER — SODIUM CHLORIDE 0.9 % (FLUSH) 0.9 %
5-40 SYRINGE (ML) INJECTION PRN
Status: DISCONTINUED | OUTPATIENT
Start: 2022-06-13 | End: 2022-06-13 | Stop reason: HOSPADM

## 2022-06-13 RX ADMIN — SODIUM CHLORIDE, POTASSIUM CHLORIDE, SODIUM LACTATE AND CALCIUM CHLORIDE: 600; 310; 30; 20 INJECTION, SOLUTION INTRAVENOUS at 09:33

## 2022-06-13 RX ADMIN — GLYCOPYRROLATE 0.2 MG: 0.4 INJECTION INTRAMUSCULAR; INTRAVENOUS at 09:47

## 2022-06-13 RX ADMIN — PROPOFOL 40 MG: 10 INJECTION, EMULSION INTRAVENOUS at 10:11

## 2022-06-13 RX ADMIN — PROPOFOL 40 MG: 10 INJECTION, EMULSION INTRAVENOUS at 10:15

## 2022-06-13 RX ADMIN — PROPOFOL 120 MG: 10 INJECTION, EMULSION INTRAVENOUS at 10:08

## 2022-06-13 RX ADMIN — LIDOCAINE HYDROCHLORIDE 100 MG: 20 INJECTION, SOLUTION INTRAVENOUS at 10:08

## 2022-06-13 ASSESSMENT — PAIN - FUNCTIONAL ASSESSMENT
PAIN_FUNCTIONAL_ASSESSMENT: 0-10

## 2022-06-13 NOTE — OP NOTE
Esophagogastroduodenoscopy with biopsy and esme snare polypectomy     Indications: Pre-op gastric Surgery, rule out Gastroesophageal reflux disease. Pre-operative Diagnosis: Obesity/pre-op bariatric surgery .     Post-operative Diagnosis: Gastritis, Hiatal hernia hill Grade 1, gastric polyp     Surgeon: Eder Alvarez     Anesthesia: MAC        Procedure Details   The patient was seen in the Holding Room. The risks, benefits, complications, treatment options, and expected outcomes were discussed with the patient. Pre-op Endoscopy recommended to rule any intragastric pathology that would interfere with proposed procedure /  And or due to current conditions. The possibilities of reaction to medication, pulmonary aspiration, perforation of viscus, bleeding, recurrent infection, the need for additional procedures, failure to diagnose a condition, and creating a complication requiring transfusion or operation were discussed with the patient. The patient concurred with the proposed plan, giving informed consent. The site of surgery properly noted/marked. The patient was taken to Endoscopy Suite, identified and the procedure verified as  Esophagogastroduodenoscopy  A Time Out was held and the above information confirmed.        Upper Endoscopy:     An endoscope was inserted through the oropharynx into the upper esophagus. The endoscope was passed into the stomach to the level of the pylorus and passed to the duodenum where the ampulla was visualized and duodenum was normal. Then the scope was retracted back to the stomach and it was normal except for a large polyp in the fundus, biopsy of the antrum obtained for H. Pylori, then retroflexed and the gastroesophageal junction was inspected. There was a hiatal hernia and no evidence of Lynch's      Attention was then paid to a large single polyp in the fundus of the stomach. Cold snare technique was used to transect this polyp at the base and remove in entirety.  Hemostasis maintained throughout this removal and site checked thoroughly was dry. Polyp sent for pathologic examination.     Findings:  Esophageal findings include:  Upper: normal Esophageal Mucosa  Lower:normal Esophageal Mucosa  Distal: normal Esophageal Mucosa        Gastric findings include: gastric Polyp     Duodenal Findings: normal Duodenal Mucosa     Estimated Blood Loss:  none     Biopsy:  Antrum, Gastric polyp     Complications:  None; patient tolerated the procedure well. Disposition: PACU - hemodynamically stable.            Condition: stable     Attending Attestation: I was present and scrubbed for the entire procedure.

## 2022-06-13 NOTE — PROGRESS NOTES
Ambulatory Surgery/Procedure Discharge Note    Patient tolerated procedure well. Patient denies nausea, cramping or pain post procedure. Discharge instructions and education reviewed with patient and daughter. Written instructions provided at discharge. Patient discharged ambulatory in wheelchair to car. Daughter to drive pt home. Vitals:    06/13/22 1050   BP: 123/70   Pulse: 50   Resp: 16   Temp:    SpO2: 97%       In: 520 [P.O.:220; I.V.:300]  Out: -     Restroom use offered before discharge. Yes    Pain assessment:  none  Pain Level: 0        Patient discharged to home/self care.  Patient discharged via wheel chair by transporter to waiting family/S.O.       6/13/2022 1112 AM

## 2022-06-13 NOTE — ANESTHESIA POSTPROCEDURE EVALUATION
Department of Anesthesiology  Postprocedure Note    Patient: Walt Birmingham  MRN: [id number]  YOB: 1970  Date of evaluation: 6/13/2022  Time:  11:03 AM     Procedure Summary     Date: 06/13/22 Room / Location: [facility]  / [facility]    Anesthesia Start: 0486 Anesthesia Stop: 6252    Procedure: EGD BIOPSY (N/A ) Diagnosis:       Chronic GERD      (Chronic GERD [K21.9])    Surgeons: Hernesto Calloway DO Responsible Provider: Cherise Maldonado MD    Anesthesia Type: MAC ASA Status: 3          Anesthesia Type: No value filed. Suraj Phase I: Suraj Score: 10    Suraj Phase II: Suraj Score: 9    Last vitals: Reviewed and per EMR flowsheets.        Anesthesia Post Evaluation    Patient location during evaluation: PACU  Level of consciousness: awake  Complications: no  Multimodal analgesia pain management approach

## 2022-06-13 NOTE — H&P
HPI:   Pre-bariatric screening  Here for EGD        Past Medical History        Past Medical History:   Diagnosis Date    Acid reflux      Anesthesia       SLOW TO WAKE UP    Brain tumor (benign) ([address]      Chronic GERD      Kidney anomaly, congenital 2006    Kidney function abnormal       undeveloped kidney at birth (pt does not remeber which one)    Kidney stone      Liver spot      Thyroid disease           Past Surgical History         Past Surgical History:   Procedure Laterality Date    BACK SURGERY         SECTION        CHOLECYSTECTOMY        GALLBLADDER SURGERY   2018    HYSTERECTOMY   7/15/11     laproscopic supracervical hysterectomy    PARTIAL HYSTERECTOMY        TUBAL LIGATION             Family History         Family History   Problem Relation Age of Onset    Cancer Mother           BREAST    Kidney Disease Mother      Cancer Father           PROSTATE    Stroke Father           Social History            Tobacco Use    Smoking status: Former Smoker       Packs/day: 0.10       Years: 2.00       Pack years: 0.20    Smokeless tobacco: Never Used    Tobacco comment: QUIT 20 YRS AGO   Substance Use Topics    Alcohol use: Yes       Comment: rare      I counseled the patient on the importance of not smoking and risks of ETOH. Allergies   Allergen Reactions    Elemental Sulfur      Other      Sulfa Antibiotics      Codeine      Pork-Derived Products Diarrhea       Vomiting.  Pt can eat some pork products    Codeine Nausea And Vomiting    Sulfa Antibiotics Nausea And Vomiting      Vitals       Vitals:     22 0922   BP: (!) 148/76   Pulse: 89   Weight: 260 lb (117.9 kg)   Height: 5' 7.5\" (1.715 m)            Body mass index is 40.12 kg/m².       Current Medication      Current Outpatient Medications:     vitamin E 400 UNIT capsule, Take 400 Units by mouth daily, Disp: , Rfl:     esomeprazole Magnesium (NEXIUM) 20 MG PACK, Take 20 mg by mouth daily , Disp: , Rfl:     Multiple Vitamins-Minerals (THERAPEUTIC MULTIVITAMIN-MINERALS) tablet, Take 1 tablet by mouth daily, Disp: , Rfl:     Omega-3 Fatty Acids (FISH OIL) 1000 MG CAPS, Take 1,000 mg by mouth daily, Disp: , Rfl:            Review of Systems - History obtained from the patient  General ROS: negative  Psychological ROS: negative  Ophthalmic ROS: negative  Neurological ROS: negative  ENT ROS: negative  Allergy and Immunology ROS: negative  Hematological and Lymphatic ROS: negative  Endocrine ROS: negative  Respiratory ROS: negative  Cardiovascular ROS: negative  Gastrointestinal ROS:negative  Genito-Urinary ROS: negative  Musculoskeletal ROS: negative   Skin ROS: negative     Physical Exam   Constitutional: Patient is oriented to person, place, and time. Vital signs are normal. Patient  appears well-developed and well-nourished. Patient  is active and cooperative. Non-toxic appearance. No distress. HENT:   Head: Normocephalic and atraumatic. Head is without laceration. Right Ear: External ear normal. No lacerations. No drainage, swelling or tenderness. Left Ear: External ear normal. No lacerations. No drainage, swelling or tenderness. Nose: Nose normal. No nose lacerations or nasal deformity. Mouth/Throat: Uvula is midline, oropharynx is clear and moist and mucous membranes are normal. No oropharyngeal exudate. Eyes: Conjunctivae, EOM and lids are normal. Pupils are equal, round, and reactive to light. Right eye exhibits no discharge. No foreign body present in the right eye. Left eye exhibits no discharge. No foreign body present in the left eye. No scleral icterus. Neck: Trachea normal and normal range of motion. Neck supple. No JVD present. No tracheal tenderness present. Carotid bruit is not present. No rigidity. No tracheal deviation and no edema present. No thyromegaly present. Cardiovascular: Normal rate, regular rhythm, normal heart sounds, intact distal pulses and normal pulses. Pulmonary/Chest: Effort normal and breath sounds normal. No stridor. No respiratory distress. Patient  has no wheezes. Patient has no rales. Patient exhibits no tenderness and no crepitus. Abdominal: Soft. Normal appearance and bowel sounds are normal. Patient exhibits no distension, no abdominal bruit, no ascites and no mass. There is no hepatosplenomegaly. There is no tenderness. There is no rigidity, no rebound, no guarding and no CVA tenderness. No hernia. Hernia confirmed negative in the ventral area. Musculoskeletal: Normal range of motion. Patient exhibits no edema or tenderness. Lymphadenopathy:        Head (right side): No submental, no submandibular, no preauricular, no posterior auricular and no occipital adenopathy present. Head (left side): No submental, no submandibular, no preauricular, no posterior auricular and no occipital adenopathy present. Patient  has no cervical adenopathy. Right: No supraclavicular adenopathy present. Left: No supraclavicular adenopathy present. Neurological: Patient is alert and oriented to person, place, and time. Patient has normal strength. Coordination and gait normal. GCS eye subscore is 4. GCS verbal subscore is 5. GCS motor subscore is 6. Skin: Skin is warm and dry. No abrasion and no rash noted. Patient  is not diaphoretic. No cyanosis or erythema. Psychiatric: Patient has a normal mood and affect. speech is normal and behavior is normal. Cognition and memory are normal.                  A/P  [place] is a very pleasant 54 y.o. female with Obesity,  Body mass index is 40.12 kg/m².     EGD    Terisa Quant

## 2022-06-13 NOTE — ANESTHESIA PRE PROCEDURE
Department of Anesthesiology  Preprocedure Note       Name:  Genny Fonseca   Age:  54 y.o.  :  1970                                          MRN:  [id number]         Date:  2022      Surgeon: Alejandro Carl): Francisco Monge DO    Procedure: Procedure(s):  ESOPHAGOGASTRODUODENOSCOPY    Medications prior to admission:   Prior to Admission medications    Medication Sig Start Date End Date Taking? Authorizing Provider   vitamin E 400 UNIT capsule Take 400 Units by mouth daily    Historical Provider, MD   esomeprazole Magnesium (NEXIUM) 20 MG PACK Take 20 mg by mouth daily     Historical Provider, MD   Multiple Vitamins-Minerals (THERAPEUTIC MULTIVITAMIN-MINERALS) tablet Take 1 tablet by mouth daily    Historical Provider, MD   Omega-3 Fatty Acids (FISH OIL) 1000 MG CAPS Take 1,000 mg by mouth daily    Historical Provider, MD       Current medications:    No current facility-administered medications for this encounter. Allergies: Allergies   Allergen Reactions    Elemental Sulfur     Other     Sulfa Antibiotics     Codeine     Pork-Derived Products Diarrhea     Vomiting.  Pt can eat some pork products    Codeine Nausea And Vomiting    Sulfa Antibiotics Nausea And Vomiting       Problem List:    Patient Active Problem List   Diagnosis Code    Family history of breast cancer in mother Z80.2   Keyon Rudder Atrophic kidney N26.1    Kidney stones N20.0    Thyroid disease E07.9    Ear problem H93.90    Primary insomnia F51.01    Eustachian tube dysfunction H69.80    Subclinical hyperthyroidism E05.90    Left thyroid nodule E04.1    Brain mass G93.89    Diffuse cystic mastopathy of both breasts N60.12, N60.11    Meningioma (HCC) D32.9    Otalgia H92.09    TMJ dysfunction M26.609    Chronic GERD K21.9       Past Medical History:        Diagnosis Date    Acid reflux     Anesthesia     SLOW TO WAKE UP    Brain tumor (benign) (HCC)     Chronic GERD     Kidney anomaly, congenital 2006    Kidney function abnormal     undeveloped kidney at birth (pt does not remeber which one)    Kidney stone     Liver spot     Thyroid disease        Past Surgical History:        Procedure Laterality Date    BACK SURGERY       SECTION      CHOLECYSTECTOMY      GALLBLADDER SURGERY  2018    HYSTERECTOMY (CERVIX STATUS UNKNOWN)  7/15/11    laproscopic supracervical hysterectomy    PARTIAL HYSTERECTOMY (CERVIX NOT REMOVED)      TUBAL LIGATION         Social History:    Social History     Tobacco Use    Smoking status: Former Smoker     Packs/day: 0.10     Years: 2.00     Pack years: 0.20    Smokeless tobacco: Never Used    Tobacco comment: QUIT 21 YRS AGO   Substance Use Topics    Alcohol use: Yes     Comment: rare                                Counseling given: Not Answered  Comment: QUIT 20 YRS AGO      Vital Signs (Current): There were no vitals filed for this visit.                                            BP Readings from Last 3 Encounters:   22 122/85   22 (!) 148/76   20 124/80       NPO Status:                                                                                 BMI:   Wt Readings from Last 3 Encounters:   22 256 lb (116.1 kg)   22 256 lb (116.1 kg)   22 260 lb (117.9 kg)     There is no height or weight on file to calculate BMI.    CBC:   Lab Results   Component Value Date    WBC 5.5 2022    RBC 4.77 2022    HGB 14.4 2022    HCT 42.9 2022    MCV 90.0 2022    RDW 14.0 2022     2022       CMP:   Lab Results   Component Value Date     2022    K 3.9 2022    K 3.4 2020     2022    CO2 25 2022    BUN 9 2022    CREATININE 0.7 2022    GFRAA >60 2022    GFRAA >60 2011    AGRATIO 1.7 2022    LABGLOM >60 2022    GLUCOSE 101 2022    PROT 7.1 2022    CALCIUM 9.4 2022    BILITOT 0.5 2022    ALKPHOS 100 2022 AST 28 05/25/2022    ALT 42 05/25/2022       POC Tests: No results for input(s): POCGLU, POCNA, POCK, POCCL, POCBUN, POCHEMO, POCHCT in the last 72 hours. Coags: No results found for: PROTIME, INR, APTT    HCG (If Applicable):   Lab Results   Component Value Date    PREGTESTUR Negative 12/19/2020        ABGs: No results found for: PHART, PO2ART, TIZ8BQN, DBT5OXL, BEART, V4SODPSF     Type & Screen (If Applicable):  Lab Results   Component Value Date    LABABO O 07/11/2011    LABRH Negative 07/11/2011       Drug/Infectious Status (If Applicable):  No results found for: HIV, HEPCAB    COVID-19 Screening (If Applicable): No results found for: COVID19        Anesthesia Evaluation   history of anesthetic complications:   Airway: Mallampati: II  TM distance: >3 FB   Neck ROM: full  Mouth opening: > = 3 FB   Dental:          Pulmonary:                              Cardiovascular:            Rhythm: regular  Rate: normal                    Neuro/Psych:               GI/Hepatic/Renal:             Endo/Other:                     Abdominal:             Vascular: Other Findings:           Anesthesia Plan      MAC     ASA 3       Induction: intravenous. Anesthetic plan and risks discussed with patient. Plan discussed with CRNA.                     Alicia Carr MD   6/13/2022

## 2022-06-19 NOTE — PROGRESS NOTES
[facility]) Physicians   General & Laparoscopic Surgery  [employer]       HPI:     Issac Carr is a very pleasant 54 y.o. female with Body mass index is 39.5 kg/m². , Pre-Surgery. Pre-operative clearance and work up pending. Working hard to keep good dietary habits as well level of activity. Patient denies any nausea, vomiting, fevers, chills, shortness of breath, chest pain, cough, constipation or difficulty urinating. Past Medical History:   Diagnosis Date    Acid reflux     Anesthesia     SLOW TO WAKE UP    Brain tumor (benign) ([address]     Chronic GERD     Kidney anomaly, congenital     Kidney function abnormal     undeveloped kidney at birth (pt does not remeber which one)    Kidney stone     Liver spot     Prolonged emergence from general anesthesia     Thyroid disease      Past Surgical History:   Procedure Laterality Date    BACK SURGERY      L4-L5 herniated disc     SECTION      CHOLECYSTECTOMY  2018    HYSTERECTOMY (CERVIX STATUS UNKNOWN)  07/15/2011    laproscopic supracervical hysterectomy    PARTIAL HYSTERECTOMY (CERVIX NOT REMOVED)      TUBAL LIGATION      UPPER GASTROINTESTINAL ENDOSCOPY N/A 2022    EGD BIOPSY performed by Nayeli Parnell DO at [facility] ENDOSCOPY     Family History   Problem Relation Age of Onset    Cancer Mother         BREAST    Kidney Disease Mother     Cancer Father         PROSTATE    Stroke Father      Social History     Tobacco Use    Smoking status: Former Smoker     Packs/day: 0.10     Years: 2.00     Pack years: 0.20    Smokeless tobacco: Never Used    Tobacco comment: QUIT 20 YRS AGO   Substance Use Topics    Alcohol use: Yes     Comment: rare     I counseled the patient on the importance of not smoking and risks of ETOH. Allergies   Allergen Reactions    Elemental Sulfur     Other     Sulfa Antibiotics     Codeine     Pork-Derived Products Diarrhea     Vomiting.  Pt can eat some pork products    Codeine Nausea And Vomiting    Sulfa Antibiotics Nausea And Vomiting     Vitals:    05/25/22 0741   Weight: 256 lb (116.1 kg)   Height: 5' 7.5\" (1.715 m)       Body mass index is 39.5 kg/m². Current Outpatient Medications:     vitamin E 400 UNIT capsule, Take 400 Units by mouth daily, Disp: , Rfl:     esomeprazole Magnesium (NEXIUM) 20 MG PACK, Take 20 mg by mouth daily , Disp: , Rfl:     Multiple Vitamins-Minerals (THERAPEUTIC MULTIVITAMIN-MINERALS) tablet, Take 1 tablet by mouth daily, Disp: , Rfl:     Omega-3 Fatty Acids (FISH OIL) 1000 MG CAPS, Take 1,000 mg by mouth daily, Disp: , Rfl:     meloxicam (MOBIC) 15 MG tablet, TAKE 1 TABLET BY MOUTH EVERY DAY, Disp: , Rfl:       Review of Systems - History obtained from the patient  General ROS: negative  Psychological ROS: negative  Endocrine ROS: negative  Respiratory ROS: negative  Cardiovascular ROS: negative  Gastrointestinal ROS:negative  Genito-Urinary ROS: negative  Musculoskeletal ROS: negative   Skin ROS: negative    Physical Exam   Vitals Reviewed   Constitutional: Patient is oriented to person, place, and time. Patient appears well-developed and well-nourished. Patient is active and cooperative. Non-toxic appearance. No distress. Neck: Trachea normal and normal range of motion. No JVD present. Pulmonary/Chest: Effort normal. No accessory muscle usage or stridor. No apnea. No respiratory distress. Cardiovascular: Normal rate and no JVD. Abdominal: Normal appearance. Patient exhibits no distension. Abdomen is soft, obese, non tender. Musculoskeletal: Normal range of motion. Patient exhibits no edema. Neurological: Patient is alert and oriented to person, place, and time. Patient has normal strength. GCS eye subscore is 4. GCS verbal subscore is 5. GCS motor subscore is 6. Skin: Skin is warm and dry. No abrasion and no rash noted. Patient is not diaphoretic. No cyanosis or erythema. Psychiatric: Patient has a normal mood and affect. Speech is normal and behavior is normal. Cognition and memory are normal.       A/P    Jefferson Whitt is 54 y.o. female, Body mass index is 39.5 kg/m². pre surgery, has lost 4# since last visit. The patient underwent dietary counseling with registered dietician. I have reviewed, discussed and agree with the dietary plan. Patient is trying hard to keep good dietary and behavior modifications. Patient is monitoring portion sizes, food choices and liquid calories. Patient is trying to exercise regularly as much as possible. We discussed how her weight affects her overall health including:  Claus Powell was seen today for obesity. Diagnoses and all orders for this visit:    Morbid obesity with BMI of 40.0-44.9, adult ([facility] [address]    Chronic GERD       and importance of weight loss to alleviate those co morbid conditions. I encouraged the patient to continue exercise and keeping healthy eating habits. Discussed pre-op labs and work up till now. Also counseled the patient extensively on Surgery. Total encounter time: 20 minutes including any number of the following: review of labs, imaging, provider notes, outside hospital records; performing examination/evaluation; counseling patient and family; ordering medications/tests; placing referrals and communication with referring physicians; coordination of care, and documentation in the EHR. RTC in 4 weeks  Obtain rest of pre-op work up / clearances  Diet and Exercise      Patient advised that its their responsibility to follow up for studies and/or labs ordered today.      Dante Morris

## 2022-06-29 ENCOUNTER — TELEPHONE (OUTPATIENT)
Dept: BARIATRICS/WEIGHT MGMT | Age: 52
End: 2022-06-29

## 2022-06-29 ENCOUNTER — OFFICE VISIT (OUTPATIENT)
Dept: BARIATRICS/WEIGHT MGMT | Age: 52
End: 2022-06-29
Payer: COMMERCIAL

## 2022-06-29 VITALS — HEIGHT: 68 IN | WEIGHT: 253 LBS | BODY MASS INDEX: 38.34 KG/M2

## 2022-06-29 DIAGNOSIS — K21.9 HIATAL HERNIA WITH GERD: ICD-10-CM

## 2022-06-29 DIAGNOSIS — K21.9 CHRONIC GERD: ICD-10-CM

## 2022-06-29 DIAGNOSIS — E66.01 MORBID OBESITY WITH BMI OF 40.0-44.9, ADULT (HCC): Primary | ICD-10-CM

## 2022-06-29 DIAGNOSIS — K44.9 HIATAL HERNIA WITH GERD: ICD-10-CM

## 2022-06-29 PROCEDURE — 99214 OFFICE O/P EST MOD 30 MIN: CPT | Performed by: SURGERY

## 2022-06-29 ASSESSMENT — ENCOUNTER SYMPTOMS
SHORTNESS OF BREATH: 0
ALLERGIC/IMMUNOLOGIC NEGATIVE: 1
GASTROINTESTINAL NEGATIVE: 1
EYES NEGATIVE: 1
RESPIRATORY NEGATIVE: 1
COUGH: 0

## 2022-06-29 NOTE — TELEPHONE ENCOUNTER
Transaction ID: [id number]  Customer ID: [id number]  Transaction Date: 2022-06-29    Marlene Horan Patient  Member ID  [id number]    Date of Birth  1970    Gender  NA    Eligibility Status  A    Group Number  [id number]    Plan / Coverage Date  2022-01-01 - [id number]    Transaction Type  Inpatient Authorization    Organization  [facility]    Payer  [place] - [address]  Certificate Information  Reference Number  NA    Status  PENDED    Review Reason 1  Certification Responsibility of External Review Organization    Member Information  Patient Name  Marlene Horan    Patient Date of Birth  1970    Member ID  [id number]    Relationship to [address] Name  Marlene Horan    Requesting Provider     Name  Abdiaziz Arellano  [id number]    Specialty  [id number]  Provider Role  Provider    Address  [address] [facility] [address]    Phone  [phone]  Contact Name  Yelitza Garcia [facility] [address]  Service Type  2 - Surgical    Place of Service  [address] [place] [address]    Admission - Discharge Date  2022-07-26 - 2022-07-27    Admission Type  Elective    Quantity  1 Days  Diagnosis Code 1   - Morbid (severe) obesity due to excess calories    Diagnosis Code 2  K219 - Gastro-esophageal reflux disease without esophagitis    Diagnosis Code 3  K449 - Diaphragmatic hernia without obstruction or gangrene    Level of Care 1  0160 - Room & Board-Other-General    Procedure Code 1 (CPT/HCPCS)  97441    Procedure Service Quantity  1 Units    Procedure Code 2 (CPT/HCPCS)  31177    Procedure Service Quantity  1 Units    Rendering Provider/Facility     Provider 1  Name  [address] [facility]  [id number]    Specialty  [id number]  Provider Role  Service Provider    Address  [address] [facility], [address]    Provider 2  Name  [address] [place] [address]  [id number]    Provider Role  Facility    Address  [address] [address]

## 2022-06-29 NOTE — PROGRESS NOTES
[facility]) Physicians   General & Laparoscopic Surgery  [employer]       HPI:     Barbara Braun is a very pleasant 54 y.o. female with Body mass index is 39.04 kg/m². , Pre-Surgery. Pre-operative clearance and work up pending. Working hard to keep good dietary habits as well level of activity. Patient denies any nausea, vomiting, fevers, chills, shortness of breath, chest pain, cough, constipation or difficulty urinating. Past Medical History:   Diagnosis Date    Acid reflux     Anesthesia     SLOW TO WAKE UP    Brain tumor (benign) ([address]     Chronic GERD     Kidney anomaly, congenital     Kidney function abnormal     undeveloped kidney at birth (pt does not remeber which one)    Kidney stone     Liver spot     Prolonged emergence from general anesthesia     Thyroid disease      Past Surgical History:   Procedure Laterality Date    BACK SURGERY      L4-L5 herniated disc     SECTION      CHOLECYSTECTOMY  2018    HYSTERECTOMY (CERVIX STATUS UNKNOWN)  07/15/2011    laproscopic supracervical hysterectomy    PARTIAL HYSTERECTOMY (CERVIX NOT REMOVED)      TUBAL LIGATION      UPPER GASTROINTESTINAL ENDOSCOPY N/A 2022    EGD BIOPSY performed by Kerline Mitchell DO at [facility] ENDOSCOPY     Family History   Problem Relation Age of Onset    Cancer Mother         BREAST    Kidney Disease Mother     Cancer Father         PROSTATE    Stroke Father      Social History     Tobacco Use    Smoking status: Former Smoker     Packs/day: 0.10     Years: 2.00     Pack years: 0.20    Smokeless tobacco: Never Used    Tobacco comment: QUIT 20 YRS AGO   Substance Use Topics    Alcohol use: Yes     Comment: rare     I counseled the patient on the importance of not smoking and risks of ETOH. Allergies   Allergen Reactions    Elemental Sulfur     Other     Sulfa Antibiotics     Codeine     Pork-Derived Products Diarrhea     Vomiting.  Pt can eat some pork products    Codeine Nausea And Vomiting    Sulfa Antibiotics Nausea And Vomiting     Vitals:    06/29/22 0737   Weight: 253 lb (114.8 kg)   Height: 5' 7.5\" (1.715 m)       Body mass index is 39.04 kg/m². Current Outpatient Medications:     meloxicam (MOBIC) 15 MG tablet, TAKE 1 TABLET BY MOUTH EVERY DAY, Disp: , Rfl:     vitamin E 400 UNIT capsule, Take 400 Units by mouth daily, Disp: , Rfl:     esomeprazole Magnesium (NEXIUM) 20 MG PACK, Take 20 mg by mouth daily , Disp: , Rfl:     Multiple Vitamins-Minerals (THERAPEUTIC MULTIVITAMIN-MINERALS) tablet, Take 1 tablet by mouth daily, Disp: , Rfl:     Omega-3 Fatty Acids (FISH OIL) 1000 MG CAPS, Take 1,000 mg by mouth daily, Disp: , Rfl:       Review of Systems - History obtained from the patient  General ROS: negative  Psychological ROS: negative  Endocrine ROS: negative  Respiratory ROS: negative  Cardiovascular ROS: negative  Gastrointestinal ROS:negative  Genito-Urinary ROS: negative  Musculoskeletal ROS: negative   Skin ROS: negative    Physical Exam   Vitals Reviewed   Constitutional: Patient is oriented to person, place, and time. Patient appears well-developed and well-nourished. Patient is active and cooperative. Non-toxic appearance. No distress. Neck: Trachea normal and normal range of motion. No JVD present. Pulmonary/Chest: Effort normal. No accessory muscle usage or stridor. No apnea. No respiratory distress. Cardiovascular: Normal rate and no JVD. Abdominal: Normal appearance. Patient exhibits no distension. Abdomen is soft, obese, non tender. Musculoskeletal: Normal range of motion. Patient exhibits no edema. Neurological: Patient is alert and oriented to person, place, and time. Patient has normal strength. GCS eye subscore is 4. GCS verbal subscore is 5. GCS motor subscore is 6. Skin: Skin is warm and dry. No abrasion and no rash noted. Patient is not diaphoretic. No cyanosis or erythema.    Psychiatric: Patient has a normal mood and affect. Speech is normal and behavior is normal. Cognition and memory are normal.       A/P    Ronald Kolb is 54 y.o. female, Body mass index is 39.04 kg/m². pre surgery, has lost 4# since last visit. The patient underwent dietary counseling with registered dietician. I have reviewed, discussed and agree with the dietary plan. Patient is trying hard to keep good dietary and behavior modifications. Patient is monitoring portion sizes, food choices and liquid calories. Patient is trying to exercise regularly as much as possible. We discussed how her weight affects her overall health including:  Vic Louie was seen today for obesity. Diagnoses and all orders for this visit:    Morbid obesity with BMI of 40.0-44.9, adult ([address]    Chronic GERD    Hiatal hernia with GERD       and importance of weight loss to alleviate those co morbid conditions. I encouraged the patient to continue exercise and keeping healthy eating habits. Discussed pre-op labs and work up till now. Also counseled the patient extensively on Surgery. Following The Metabolic and Bariatric Surgery Accreditation and Quality Improvement Program [facility]), and [employer] (ACS) recommendations, the Bariatric Surgical Risk/Benefit Calculator was used, and report was discussed with patient, who wishes to proceed with surgery, fully understanding the risks and benefits. Of note, [facility] Bariatric Surgical Risk/Benefit Calculator estimates the chance of an unfavorable outcome (such as a complication or death), the chance of remission of weight-related comorbidities, and the patient's BMI, weight change, and percent total weight change after surgery. These quantities are estimated based upon information the patient gives the healthcare provider about prior health history.  The estimates are calculated using data from a large number of patients who had a primary bariatric surgical procedure similar to the one the patient may have.  Please note the risk percentages, remission percentages, BMI, weight change, and percent total weight change provided to you by the risk/benefit calculator are only estimates. These estimates only take certain information into account. There may be other factors that are not included in the estimate which may increase or decrease the risk of a complication, the chance of remission of a weight-related comorbidity, or the amount of weight the patient loses. These estimates are not a guarantee of results. A complication after surgery may happen even if the risk is low, a weight-related comorbidity may not go into remission even if the chances are high, and a patient may lose more or less weight than predicted. This information is not intended to replace the advice of a doctor or healthcare provider about the diagnosis, treatment, or potential outcomes. The Provider is not responsible for medical decisions that may be made by the patient based on the risk/benefit calculator estimates, since these estimates are provided for informational purposes. Patients should always consult their doctor or other health care provider before deciding on a treatment plan. Both open and laparoscopic approach were explained in details. Benefits and risks explained. Informed consent obtained. Risks including but not limited to; Infection, bleeding, gastric leak or obstruction, persistent nausea, vomiting, or reflux, injury to surrounding structures, risks of anesthesia, stricture, delayed gastric emptying, staple line leak, incisional hernia, malnutrition , hair loss, and/ or Conversion to Open surgery may be necessary. Failure to lose or maintain weight loss, Gallstones or Kidney Stones, Deep Venous Thrombosis , pulmonary embolism and / or death.         I did explain thoroughly to the patient that compliance with pre- and post op diet and other recommendations are integral part to improve the chances of successful weight loss and also not following it could end with serious health complications. We discussed that our goal is to ameliorate the medical problems and not to obtain a specific body mass index. Patient understands the risks and benefits and wishes to proceed with the procedure. Also understands if BMI is lower than 40 without significant co morbid conditions, concerns for risks of surgery being somewhat higher over long run, however patient wants to proceed and fully understands the risks. Clearly BMI over 35 does impose very serious health risks as well chances of losing weight on diet only is very limited and sustaining weight loss is even less, thus surgery is certainly recommended for long term weight loss and better health overall given compliance. Obesity as a disease is considered a high risk to patients overall health and should therefore be considered a high risk disease state. Now with Covid-19 pandemic, CDC and health authorities does classify obese patients as vulnerable and high risk as well. Which makes weight loss a priority for improvement of their wellbeing and overall health. I advised the patient that we can't guarantee final insurance approval.        I advised the patient that sometimes other indicated procedures may arise and the decision will be based on my assessment intraoperatively. Some may include include but not limited to:  [Ventral Hernia, Risks include but not limited to; infection, bleeding, injury to organs or nerves or vessels, PE, DVT, cardiac events, , persistent pain or symptoms, abscess, hernia recurrence or need for further procedures.  However that will be determined intra-operatively, if not safe to proceed , then any additional procedures will have to be addressed later as primary goal is bariatric surgery.]      [ Hiatal Hernia, will attempt repair,  risks and benefits including but not limited to; hemothorax, pneumothorax, recurrence, difficulty swallowing, persistent symptoms, reflux and need for medications, esophageal, splenic, lung, heart, bowel, vagus nerve or gastric injuries. However that will be determined intra-operatively, if not safe to proceed, then any additional procedures will have to be addressed later as primary goal is bariatric surgery.]     Patient understands that there may be a need to perform other urgent or necessary procedures that were unanticipated. Patient consents to the performance of any additional procedures determined during the original procedure to be in their best interests and where delay might cause additional harm or put patient at risk for additional anesthesia risk for required by a second procedure and that will be determined by the surgeon. Patient is aware if Weight gain occurs in pre-op period while on pre-operative diet or  non compliant with it , surgery will be canceled. Patient understands that in relation to the COVID-19 pandemic and surgical procedures, they have been given the opportunity to postpone   surgery until a  later date, and has chosen to proceed with surgery knowing the risks associated with COVID-19. Patient was satisfied with the discussion we had and had no further questions at end of visit and wants to proceed with surgery. 1- Pre-operative work up ordered for you(labs/x-rays/EKG). 2- Stop taking Blood thinners,one week before surgery. 3- F/U with PCP for pre-op Medical Clearance. 4- Plan for Robotic Sleeve, possible other indicated procedures. Patient advised that its their responsibility to follow up for studies, referrals and/or labs ordered today. Patient was counseled on risks of pregnancy within 2 year of bariatric surgery, understands, and accepts risk.     Inez Earl

## 2022-06-29 NOTE — PROGRESS NOTES
[address] [facility] [place]   [facility] [employer]    Subjective:      Patient ID: Domenica Carroll is a 54 y.o. female    HPI    The patient is here for their bariatric surgery preoperative education group visit. She has made several attempts at weight loss in the past without success and now has been scheduled to have bariatric surgery on 2022 for future weight loss. She is working to change her dietary behaviors and lose weight to improve comorbid conditions such as GERD. Domenica Carroll is a very pleasant 54 y.o. female with Body mass index is 39.35 kg/m². Past Medical History:   Diagnosis Date    Acid reflux     Anesthesia     SLOW TO WAKE UP    Brain tumor (benign) ([address]     Chronic GERD     Kidney anomaly, congenital     Kidney function abnormal     undeveloped kidney at birth (pt does not remeber which one)    Kidney stone     Liver spot     Prolonged emergence from general anesthesia     Thyroid disease      Past Surgical History:   Procedure Laterality Date    BACK SURGERY      L4-L5 herniated disc     SECTION      CHOLECYSTECTOMY  2018    HYSTERECTOMY (CERVIX STATUS UNKNOWN)  07/15/2011    laproscopic supracervical hysterectomy    PARTIAL HYSTERECTOMY (CERVIX NOT REMOVED)      TUBAL LIGATION      UPPER GASTROINTESTINAL ENDOSCOPY N/A 2022    EGD BIOPSY performed by Rox Sheffield DO at [facility] ENDOSCOPY     Family History   Problem Relation Age of Onset    Cancer Mother         BREAST    Kidney Disease Mother     Cancer Father         PROSTATE    Stroke Father      Social History     Tobacco Use    Smoking status: Former Smoker     Packs/day: 0.10     Years: 2.00     Pack years: 0.20    Smokeless tobacco: Never Used    Tobacco comment: QUIT 20 YRS AGO   Substance Use Topics    Alcohol use: Not Currently     Comment: rare     I counseled the patient on the importance of not smoking and risks of ETOH.    Allergies   Allergen Reactions    Elemental Sulfur  Other     Sulfa Antibiotics     Codeine     Pork-Derived Products Diarrhea     Vomiting. Pt can eat some pork products    Codeine Nausea And Vomiting    Sulfa Antibiotics Nausea And Vomiting     Vitals:    07/12/22 1440   Weight: 255 lb (115.7 kg)   Height: 5' 7.5\" (1.715 m)     Body mass index is 39.35 kg/m².     Current Outpatient Medications:     meloxicam (MOBIC) 15 MG tablet, TAKE 1 TABLET BY MOUTH EVERY DAY, Disp: , Rfl:     vitamin E 400 UNIT capsule, Take 400 Units by mouth daily, Disp: , Rfl:     esomeprazole Magnesium (NEXIUM) 20 MG PACK, Take 20 mg by mouth daily , Disp: , Rfl:     Multiple Vitamins-Minerals (THERAPEUTIC MULTIVITAMIN-MINERALS) tablet, Take 1 tablet by mouth daily, Disp: , Rfl:     Omega-3 Fatty Acids (FISH OIL) 1000 MG CAPS, Take 1,000 mg by mouth daily, Disp: , Rfl:     Lab Results   Component Value Date/Time    WBC 5.5 05/25/2022 08:08 AM    RBC 4.77 05/25/2022 08:08 AM    HGB 14.4 05/25/2022 08:08 AM    HCT 42.9 05/25/2022 08:08 AM    MCV 90.0 05/25/2022 08:08 AM    MCH 30.3 05/25/2022 08:08 AM    MCHC 33.6 05/25/2022 08:08 AM    MPV 8.8 05/25/2022 08:08 AM    NEUTOPHILPCT 50.8 05/25/2022 08:08 AM    LYMPHOPCT 37.1 05/25/2022 08:08 AM    MONOPCT 7.7 05/25/2022 08:08 AM    EOSRELPCT 3.6 05/25/2022 08:08 AM    BASOPCT 0.8 05/25/2022 08:08 AM    NEUTROABS 2.8 05/25/2022 08:08 AM    LYMPHSABS 2.0 05/25/2022 08:08 AM    MONOSABS 0.4 05/25/2022 08:08 AM    EOSABS 0.2 05/25/2022 08:08 AM     Lab Results   Component Value Date/Time     05/25/2022 08:08 AM    K 3.9 05/25/2022 08:08 AM    K 3.4 12/19/2020 06:10 PM     05/25/2022 08:08 AM    CO2 25 05/25/2022 08:08 AM    ANIONGAP 15 05/25/2022 08:08 AM    GLUCOSE 101 05/25/2022 08:08 AM    BUN 9 05/25/2022 08:08 AM    CREATININE 0.7 05/25/2022 08:08 AM    LABGLOM >60 05/25/2022 08:08 AM    GFRAA >60 05/25/2022 08:08 AM    GFRAA >60 07/11/2011 12:23 PM    CALCIUM 9.4 05/25/2022 08:08 AM    PROT 7.1 05/25/2022 08:08 AM scheduling, hospital course, wellness discovery program and what to do in the case of an emergency postoperatively. The dietitians reviewed all preoperative and postoperative diet instructions. Patient was given the opportunity to ask questions during the group visit and these questions were answered by myself and/or the dietitian. I spent a total of 45 minutes on the day of the visit and over half of that time was spent counseling the patient on proper dietary behaviors, exercise and surgery protocols.

## 2022-07-05 ENCOUNTER — TELEPHONE (OUTPATIENT)
Dept: FAMILY MEDICINE CLINIC | Age: 52
End: 2022-07-05

## 2022-07-05 NOTE — TELEPHONE ENCOUNTER
Patient is having surgery on 7/26/22 for gastric sleeve at [facility] by Dr. Placido Cortez. She stated that  she was  instructed by you to call the office if you don't have any openings.

## 2022-07-05 NOTE — PROGRESS NOTES
Patient Name:   Osmar Valdez       Type of Surgery:  Sleeve         Date of Surgery:  Tuesday July 26th      Start Pre-Op Diet On:  Wednesday July 13th      Start Clear Liquids On:  Monday July 25th        NPO after midnight the night before your surgery! Take morning medications with a small amount of water.     NOTES:_______________________________________  ______________________________________________

## 2022-07-11 ENCOUNTER — OFFICE VISIT (OUTPATIENT)
Dept: FAMILY MEDICINE CLINIC | Age: 52
End: 2022-07-11
Payer: COMMERCIAL

## 2022-07-11 VITALS
OXYGEN SATURATION: 98 % | SYSTOLIC BLOOD PRESSURE: 118 MMHG | RESPIRATION RATE: 16 BRPM | WEIGHT: 257.2 LBS | BODY MASS INDEX: 38.98 KG/M2 | TEMPERATURE: 97.1 F | HEART RATE: 62 BPM | DIASTOLIC BLOOD PRESSURE: 80 MMHG | HEIGHT: 68 IN

## 2022-07-11 DIAGNOSIS — Z01.818 PRE-OP EXAM: Primary | ICD-10-CM

## 2022-07-11 DIAGNOSIS — R94.31 ABNORMAL EKG: ICD-10-CM

## 2022-07-11 PROCEDURE — 93000 ELECTROCARDIOGRAM COMPLETE: CPT | Performed by: FAMILY MEDICINE

## 2022-07-11 PROCEDURE — 99212 OFFICE O/P EST SF 10 MIN: CPT | Performed by: FAMILY MEDICINE

## 2022-07-11 ASSESSMENT — ENCOUNTER SYMPTOMS
BACK PAIN: 0
CHEST TIGHTNESS: 0
SHORTNESS OF BREATH: 0
WHEEZING: 0
STRIDOR: 0
CHOKING: 0
ABDOMINAL PAIN: 0
COUGH: 0

## 2022-07-11 ASSESSMENT — PATIENT HEALTH QUESTIONNAIRE - PHQ9
SUM OF ALL RESPONSES TO PHQ QUESTIONS 1-9: 0
1. LITTLE INTEREST OR PLEASURE IN DOING THINGS: 0
SUM OF ALL RESPONSES TO PHQ QUESTIONS 1-9: 0
2. FEELING DOWN, DEPRESSED OR HOPELESS: 0
SUM OF ALL RESPONSES TO PHQ QUESTIONS 1-9: 0
SUM OF ALL RESPONSES TO PHQ9 QUESTIONS 1 & 2: 0
SUM OF ALL RESPONSES TO PHQ QUESTIONS 1-9: 0

## 2022-07-11 NOTE — PROGRESS NOTES
Subjective:      Patient ID: Kaitlin Chawla is a 54 y.o. female. LULY Powell is here for a preop exam prior to bariatric surgery. She had abnormal EKG and will need cardiac clearance prior to her procedure. Review of Systems   Constitutional: Negative for activity change, appetite change, chills, diaphoresis, fatigue, fever and unexpected weight change. Respiratory: Negative for cough, choking, chest tightness, shortness of breath, wheezing and stridor. Cardiovascular: Negative for chest pain, palpitations and leg swelling. Gastrointestinal: Negative for abdominal pain. Genitourinary: Negative for difficulty urinating. Musculoskeletal: Negative for arthralgias and back pain. Skin: Negative for rash. Neurological: Negative for dizziness. Objective:   Physical Exam  Vitals and nursing note reviewed. Constitutional:       Appearance: She is well-developed. HENT:      Head: Normocephalic and atraumatic. Right Ear: External ear normal.      Left Ear: External ear normal.      Nose: Nose normal.   Eyes:      Conjunctiva/sclera: Conjunctivae normal.      Pupils: Pupils are equal, round, and reactive to light. Neck:      Thyroid: No thyromegaly. Vascular: No JVD. Trachea: No tracheal deviation. Cardiovascular:      Rate and Rhythm: Normal rate and regular rhythm. Heart sounds: Normal heart sounds. No murmur heard. No friction rub. No gallop. Pulmonary:      Effort: Pulmonary effort is normal. No respiratory distress. Breath sounds: Normal breath sounds. No stridor. No wheezing or rales. Chest:      Chest wall: No tenderness. Abdominal:      General: Bowel sounds are normal. There is no distension. Palpations: Abdomen is soft. There is no mass. Tenderness: There is no abdominal tenderness. There is no guarding or rebound. Musculoskeletal:         General: No tenderness. Normal range of motion.       Cervical back: Normal range of motion and neck supple. Lymphadenopathy:      Cervical: No cervical adenopathy. Skin:     General: Skin is warm and dry. Coloration: Skin is not pale. Findings: No erythema or rash. Neurological:      Mental Status: She is alert and oriented to person, place, and time. Cranial Nerves: No cranial nerve deficit. Motor: No abnormal muscle tone. Coordination: Coordination normal.      Deep Tendon Reflexes: Reflexes are normal and symmetric. Reflexes normal.         Assessment / Plan:         1. Pre-op exam-Maryan is cleared pending cardiac clearance    - EKG 12 Lead  - CBC; Future  - Comprehensive Metabolic Panel; Future  - Yris Gar MD, Cardiology, [facility]    2.  Abnormal EKG-refer to [facility] cardiology for cardiac clearance    - [facility] - Hayley Neil MD, Cardiology, [facility]

## 2022-07-12 ENCOUNTER — OFFICE VISIT (OUTPATIENT)
Dept: BARIATRICS/WEIGHT MGMT | Age: 52
End: 2022-07-12

## 2022-07-12 VITALS — WEIGHT: 255 LBS | BODY MASS INDEX: 38.65 KG/M2 | HEIGHT: 68 IN

## 2022-07-12 DIAGNOSIS — K21.9 CHRONIC GERD: Primary | ICD-10-CM

## 2022-07-12 DIAGNOSIS — E66.01 SEVERE OBESITY (BMI 35.0-39.9) WITH COMORBIDITY (HCC): ICD-10-CM

## 2022-07-12 PROCEDURE — 99214 OFFICE O/P EST MOD 30 MIN: CPT | Performed by: NURSE PRACTITIONER

## 2022-07-18 ENCOUNTER — HOSPITAL ENCOUNTER (OUTPATIENT)
Age: 52
Discharge: HOME | End: 2022-07-18
Payer: COMMERCIAL

## 2022-07-18 DIAGNOSIS — Z01.818 PRE-OP EXAM: ICD-10-CM

## 2022-07-18 LAB
A/G RATIO: 1.8 (ref 1.1–2.2)
ALBUMIN SERPL-MCNC: 4.8 G/DL (ref 3.4–5)
ALP BLD-CCNC: 97 U/L (ref 40–129)
ALT SERPL-CCNC: 55 U/L (ref 10–40)
ANION GAP SERPL CALCULATED.3IONS-SCNC: 16 MMOL/L (ref 3–16)
AST SERPL-CCNC: 32 U/L (ref 15–37)
BILIRUB SERPL-MCNC: 0.6 MG/DL (ref 0–1)
BUN BLDV-MCNC: 18 MG/DL (ref 7–20)
CALCIUM SERPL-MCNC: 10.1 MG/DL (ref 8.3–10.6)
CHLORIDE BLD-SCNC: 103 MMOL/L (ref 99–110)
CO2: 24 MMOL/L (ref 21–32)
CREAT SERPL-MCNC: 0.8 MG/DL (ref 0.6–1.1)
GFR AFRICAN AMERICAN: >60
GFR NON-AFRICAN AMERICAN: >60
GLUCOSE BLD-MCNC: 109 MG/DL (ref 70–99)
HCT VFR BLD CALC: 43.2 % (ref 36–48)
HEMOGLOBIN: 15 G/DL (ref 12–16)
MCH RBC QN AUTO: 30.1 PG (ref 26–34)
MCHC RBC AUTO-ENTMCNC: 34.6 G/DL (ref 31–36)
MCV RBC AUTO: 87 FL (ref 80–100)
PDW BLD-RTO: 13.4 % (ref 12.4–15.4)
PLATELET # BLD: 199 K/UL (ref 135–450)
PMV BLD AUTO: 9.1 FL (ref 5–10.5)
POTASSIUM SERPL-SCNC: 4.2 MMOL/L (ref 3.5–5.1)
RBC # BLD: 4.97 M/UL (ref 4–5.2)
SODIUM BLD-SCNC: 143 MMOL/L (ref 136–145)
TOTAL PROTEIN: 7.4 G/DL (ref 6.4–8.2)
WBC # BLD: 5.2 K/UL (ref 4–11)

## 2022-07-18 PROCEDURE — 80053 COMPREHEN METABOLIC PANEL: CPT

## 2022-07-18 PROCEDURE — 85027 COMPLETE CBC AUTOMATED: CPT

## 2022-07-18 PROCEDURE — 36415 COLL VENOUS BLD VENIPUNCTURE: CPT

## 2022-07-18 NOTE — TELEPHONE ENCOUNTER
Certification Number  [id number]    Status  CERTIFIED IN TOTAL    Service Information  Service Type  2 - Surgical    Place of Service  [address]    Admission - Discharge Date  2022-07-26 - 2022-07-26    Admission Type  Elective    Diagnosis Code 1      Diagnosis Code 2  K219    Diagnosis Code 3  K449    Level of Care 1  0160 - Room & Board-Other-General    From - To Date  2022-07-26 - 2385-34-95    Status  CERTIFIED IN TOTAL    Message  MEETS CRITERIA/GUIDELINES    Procedure Code 1 (CPT/HCPCS)  24880    Procedure Service Quantity  1 Units    Procedure From - To Date  2022-07-26 - 0448-76-77    Status  CERTIFIED IN TOTAL    Message  MEETS CRITERIA/GUIDELINES    Procedure Code 2 (CPT/HCPCS)  36342    Procedure Service Quantity  1 Units    Procedure From - To Date  2022-07-26 - 4837-62-69    Status  CERTIFIED IN TOTAL    Message  MEETS CRITERIA/GUIDELINES    Requesting Provider     Name  Gaston Alcantar  [id number]    Tax Id  [id number]    Provider Role  Provider    Address  [address] [facility]    Phone  [phone]  Contact Name  [facility]    Rendering Providers     Provider 1  Name  Gaston Alcantar  [id number]    Tax Id  [id number]    Provider Role  Service Provider    Address  [address] [facility], [facility], [facility] [address]    Provider 2  Name  [address] [place] [address]  [id number]    Tax Id  [id number]    Provider Role  Facility    Address  [address], [address] [place] [address]

## 2022-07-21 ENCOUNTER — TELEPHONE (OUTPATIENT)
Dept: BARIATRICS/WEIGHT MGMT | Age: 52
End: 2022-07-21

## 2022-07-21 RX ORDER — LIDOCAINE HYDROCHLORIDE 10 MG/ML
0.1 INJECTION, SOLUTION EPIDURAL; INFILTRATION; INTRACAUDAL; PERINEURAL ONCE
Status: CANCELLED | OUTPATIENT
Start: 2022-07-21

## 2022-07-21 NOTE — TELEPHONE ENCOUNTER
LM for pt to confirm that she is holding her Vit E, Fish Oil, and Mobic. Let pt know her arrival time is 8:30 am for her 10:30 am surgery on 7/26/22. Pt reminded to continue her pre op diet, reminded to follow the clear, liquid diet on 7/25 and to be NPO after midnight. Pt was asked to call the office to confirm receipt of this message.

## 2022-07-21 NOTE — PROGRESS NOTES
SPOKE [address] [address] [place] TO ORDER NOT TRUE ALLERGY. ASKED PT TO RETURN CALL R/T PENDING CARDIAC CLEARANCE AND NEED FOR DOPLER PER PIETER NOTE.  ALYX

## 2022-07-21 NOTE — PROGRESS NOTES
Place patient label inside box (if no patient label, complete below)  Name:  :  MR#:   Noemi Acosta / PROCEDURE  I (we), Gilberto Console (Patient Name) authorize DR Ophelia Bejarano (Provider / Mary Alex) and/or such assistants as may be selected by him/her, to perform the following operation/procedure(s): ROBOTIC ASSISTED LAPAROSCOPIC SLEEVE GASTRECTOMY/ HIATAL HERNIA REPAIR       Note: If unable to obtain consent prior to an emergent procedure, document the emergent reason in the medical record. This procedure has been explained to my (our) satisfaction and included in the explanation was: The intended benefit, nature, and extent of the procedure to be performed; The significant risks involved and the probability of success; Alternative procedures and methods of treatment; The dangers and probable consequences of such alternatives (including no procedure or treatment); The expected consequences of the procedure on my future health; Whether other qualified individuals would be performing important surgical tasks and/or whether  would be present to advise or support the procedure. I (we) understand that there are other risks of infection and other serious complications in the pre-operative/procedural and postoperative/procedural stages of my (our) care. I (we) have asked all of the questions which I (we) thought were important in deciding whether or not to undergo treatment or diagnosis. These questions have been answered to my (our) satisfaction. I (we) understand that no assurance can be given that the procedure will be a success, and no guarantee or warranty of success has been given to me (us).     It has been explained to me (us) that during the course of the operation/procedure, unforeseen conditions may be revealed that necessitate extension of the original procedure(s) or different procedure(s) than those set forth in Paragraph 1. I (we) authorize and request that the above-named physician, his/her assistants or his/her designees, perform procedures as necessary and desirable if deemed to be in my (our) best interest.     Revised 8/2/2021                                                                          Page 1 of 2       I acknowledge that health care personnel may be observing this procedure for the purpose of medical education or other specified purposes as may be necessary as requested and/or approved by my (our) physician. I (we) consent to the disposal by the hospital Pathologist of the removed tissue, parts or organs in accordance with hospital policy. I do ____ do not ____ consent to the use of a local infiltration pain blocking agent that will be used by my provider/surgical provider to help alleviate pain during my procedure. I do ____ do not ____ consent to an emergent blood transfusion in the case of a life-threatening situation that requires blood components to be administered. This consent is valid for 24 hours from the beginning of the procedure. This patient does ____ or does not ____ currently have a DNR status/order. If DNR order is in place, obtain Addendum to the Surgical Consent for ALL Patients with a DNR Order to address mauro-operative status for limited intervention or DNR suspension.      I have read and fully understand the above Consent for Operation/Procedure and that all blanks were completed before I signed the consent.   _____________________________       _____________________      ____/____am/pm  Signature of Patient or legal representative      Printed Name / Relationship            Date / Time   ____________________________       _____________________      ____/____am/pm  Witness to Signature                                    Printed Name                    Date / Time    If patient is unable to sign or is a minor, complete the following)  Patient is a minor, ____ years of age, or unable to sign because:   ______________________________________________________________________________________________    If a phone consent is obtained, consent will be documented by using two health care professionals, each affirming that the consenting party has no questions and gives consent for the procedure discussed with the physician/provider.   _____________________          ____________________       _____/_____am/pm   2nd witness to phone consent        Printed name           Date / Time    Informed Consent:  I have provided the explanation described above in section 1 to the patient and/or legal representative.  I have provided the patient and/or legal representative with an opportunity to ask any questions about the proposed operation/procedure.   ___________________________          ____________________         ____/____am/pm  Provider / Proceduralist                            Printed name            Date / Time  Revised 8/2/2021                                                                      Page 2 of 2

## 2022-07-21 NOTE — PROGRESS NOTES
PRE-OP INSTRUCTIONS FOR THE SURGICAL PATIENT YOU ARE UNABLE TO MAKE CONTACT FOR AN INTERVIEW:        Follow all instructions provided to you from your surgeons office, including your ARRIVAL TIME. Enter the MAIN entrance located on [address] and report to the desk. Bring your insurance & photo ID with you. You may also be asked to pay a co-pay, as you may want to bring a check or credit card with you. Leave all other valuables at home. Arrange for someone to drive you home and be with you for the first 24 hours after discharge. Bring your medication list with you day of surgery with doses and frequency listed (including over the counter medications)  You must contact your surgeon for Instructions regarding:              - ALL medication instructions, especially if taking blood thinners, aspirin, or diabetic medication.         -Bariatric patients call surgeon if on diabetic medications as some need to be stopped 1 week preop  - IF  there is a change in your physical condition such as a cold, fever, rash, cuts, sores or any other infection, especially near your surgical site. A Pre-op History and Physical for surgery MUST be completed by your Physician or an Urgent Care within 30 days of your procedure date. Please bring a copy with you on the day of your procedure and along with any other testing performed. DO NOT EAT ANYTHING eight hours prior to arrival for surgery. May have up to 8 ounces of water 4 hours prior to arrival for surgery. NOTE: ALL Gastric, Bariatric and Bowel surgery patients MUST follow their surgeon's instructions. No gum, candy, mints, or ice chips day of procedure. Please refrain from drinking alcohol the day before or day of your procedure. Please do not smoke the day of your procedure. Dress in loose, comfortable clothing appropriate for redressing after your procedure.  Do not wear jewelry (including body piercings), make-up, fingernail polish, lotion, powders or metal hairclips. 15. Contacts will need to be removed prior to surgery. You may want to bring your eye glasses to wear immediately before and after surgery. 14. Dentures will need to be removed before your procedure. 13. Bring cases for your glasses, contacts, dentures, or hearing aids to protect them while you are in surgery. 16. If you use a CPAP, please bring it with you on the day of your procedure. 17. Do not shave or wax for 72 hours prior to procedure near your operative site  18. FOR WOMAN OF CHILDBEARING AGE ONLY- please make sure we can collect a urine sample on arrival.     If you have further questions, you may contact your surgeon's office or us at [phone]     Left instructions on patient's voicemail.     Nayely Wesley RN.7/21/2022 .2:23 PM

## 2022-07-22 ENCOUNTER — TELEPHONE (OUTPATIENT)
Dept: FAMILY MEDICINE CLINIC | Age: 52
End: 2022-07-22

## 2022-07-22 NOTE — TELEPHONE ENCOUNTER
Per [facility] from Dr. Jeanette Parrish office at [facility], patient given cardiac clearance. Will fax it to us to my attention. Will give you paperwork when I get it.

## 2022-07-25 ENCOUNTER — ANESTHESIA EVENT (OUTPATIENT)
Dept: OPERATING ROOM | Age: 52
DRG: 621 | End: 2022-07-25
Payer: COMMERCIAL

## 2022-07-26 ENCOUNTER — ANESTHESIA (OUTPATIENT)
Dept: OPERATING ROOM | Age: 52
DRG: 621 | End: 2022-07-26
Payer: COMMERCIAL

## 2022-07-26 ENCOUNTER — HOSPITAL ENCOUNTER (INPATIENT)
Age: 52
LOS: 1 days | Discharge: HOME | DRG: 621 | End: 2022-07-27
Attending: SURGERY | Admitting: SURGERY
Payer: COMMERCIAL

## 2022-07-26 DIAGNOSIS — R10.9 ACUTE POSTOPERATIVE PAIN OF ABDOMEN: Primary | ICD-10-CM

## 2022-07-26 DIAGNOSIS — K44.9 HIATAL HERNIA: ICD-10-CM

## 2022-07-26 DIAGNOSIS — E66.01 MORBID OBESITY (HCC): ICD-10-CM

## 2022-07-26 DIAGNOSIS — G89.18 ACUTE POSTOPERATIVE PAIN OF ABDOMEN: Primary | ICD-10-CM

## 2022-07-26 LAB
ABO/RH: NORMAL
ANTIBODY SCREEN: NORMAL
GLUCOSE BLD-MCNC: 98 MG/DL (ref 70–99)
PERFORMED ON: NORMAL

## 2022-07-26 PROCEDURE — 6360000002 HC RX W HCPCS: Performed by: NURSE ANESTHETIST, CERTIFIED REGISTERED

## 2022-07-26 PROCEDURE — 2580000003 HC RX 258: Performed by: SURGERY

## 2022-07-26 PROCEDURE — S2900 ROBOTIC SURGICAL SYSTEM: HCPCS | Performed by: SURGERY

## 2022-07-26 PROCEDURE — 2720000010 HC SURG SUPPLY STERILE: Performed by: SURGERY

## 2022-07-26 PROCEDURE — 7100000001 HC PACU RECOVERY - ADDTL 15 MIN: Performed by: SURGERY

## 2022-07-26 PROCEDURE — 86850 RBC ANTIBODY SCREEN: CPT

## 2022-07-26 PROCEDURE — 0DB64Z3 EXCISION OF STOMACH, PERCUTANEOUS ENDOSCOPIC APPROACH, VERTICAL: ICD-10-PCS | Performed by: SURGERY

## 2022-07-26 PROCEDURE — C9113 INJ PANTOPRAZOLE SODIUM, VIA: HCPCS | Performed by: SURGERY

## 2022-07-26 PROCEDURE — 8E0W4CZ ROBOTIC ASSISTED PROCEDURE OF TRUNK REGION, PERCUTANEOUS ENDOSCOPIC APPROACH: ICD-10-PCS | Performed by: SURGERY

## 2022-07-26 PROCEDURE — 2500000003 HC RX 250 WO HCPCS: Performed by: NURSE ANESTHETIST, CERTIFIED REGISTERED

## 2022-07-26 PROCEDURE — 3600000019 HC SURGERY ROBOT ADDTL 15MIN: Performed by: SURGERY

## 2022-07-26 PROCEDURE — 0BQT4ZZ REPAIR DIAPHRAGM, PERCUTANEOUS ENDOSCOPIC APPROACH: ICD-10-PCS | Performed by: SURGERY

## 2022-07-26 PROCEDURE — 88307 TISSUE EXAM BY PATHOLOGIST: CPT

## 2022-07-26 PROCEDURE — 3700000000 HC ANESTHESIA ATTENDED CARE: Performed by: SURGERY

## 2022-07-26 PROCEDURE — 94761 N-INVAS EAR/PLS OXIMETRY MLT: CPT

## 2022-07-26 PROCEDURE — 43281 LAP PARAESOPHAG HERN REPAIR: CPT | Performed by: SURGERY

## 2022-07-26 PROCEDURE — 6360000002 HC RX W HCPCS: Performed by: SURGERY

## 2022-07-26 PROCEDURE — 2709999900 HC NON-CHARGEABLE SUPPLY: Performed by: SURGERY

## 2022-07-26 PROCEDURE — 2500000003 HC RX 250 WO HCPCS: Performed by: SURGERY

## 2022-07-26 PROCEDURE — 2580000003 HC RX 258: Performed by: ANESTHESIOLOGY

## 2022-07-26 PROCEDURE — 88302 TISSUE EXAM BY PATHOLOGIST: CPT

## 2022-07-26 PROCEDURE — 43775 LAP SLEEVE GASTRECTOMY: CPT | Performed by: SURGERY

## 2022-07-26 PROCEDURE — 2580000003 HC RX 258: Performed by: NURSE ANESTHETIST, CERTIFIED REGISTERED

## 2022-07-26 PROCEDURE — 3700000001 HC ADD 15 MINUTES (ANESTHESIA): Performed by: SURGERY

## 2022-07-26 PROCEDURE — 86901 BLOOD TYPING SEROLOGIC RH(D): CPT

## 2022-07-26 PROCEDURE — 86900 BLOOD TYPING SEROLOGIC ABO: CPT

## 2022-07-26 PROCEDURE — 3600000009 HC SURGERY ROBOT BASE: Performed by: SURGERY

## 2022-07-26 PROCEDURE — 1200000000 HC SEMI PRIVATE

## 2022-07-26 PROCEDURE — 7100000000 HC PACU RECOVERY - FIRST 15 MIN: Performed by: SURGERY

## 2022-07-26 PROCEDURE — 6370000000 HC RX 637 (ALT 250 FOR IP): Performed by: SURGERY

## 2022-07-26 RX ORDER — ACETAMINOPHEN 500 MG
1000 TABLET ORAL
Status: DISCONTINUED | OUTPATIENT
Start: 2022-07-26 | End: 2022-07-26

## 2022-07-26 RX ORDER — CELECOXIB 200 MG/1
200 CAPSULE ORAL
Status: DISCONTINUED | OUTPATIENT
Start: 2022-07-26 | End: 2022-07-26

## 2022-07-26 RX ORDER — APREPITANT 40 MG/1
80 CAPSULE ORAL ONCE
Status: COMPLETED | OUTPATIENT
Start: 2022-07-26 | End: 2022-07-26

## 2022-07-26 RX ORDER — SODIUM CHLORIDE 9 MG/ML
INJECTION, SOLUTION INTRAVENOUS PRN
Status: DISCONTINUED | OUTPATIENT
Start: 2022-07-26 | End: 2022-07-27 | Stop reason: HOSPADM

## 2022-07-26 RX ORDER — FAMOTIDINE 10 MG/ML
INJECTION, SOLUTION INTRAVENOUS PRN
Status: DISCONTINUED | OUTPATIENT
Start: 2022-07-26 | End: 2022-07-26 | Stop reason: SDUPTHER

## 2022-07-26 RX ORDER — METOCLOPRAMIDE HYDROCHLORIDE 5 MG/ML
10 INJECTION INTRAMUSCULAR; INTRAVENOUS EVERY 6 HOURS PRN
Status: DISCONTINUED | OUTPATIENT
Start: 2022-07-26 | End: 2022-07-27 | Stop reason: HOSPADM

## 2022-07-26 RX ORDER — METOCLOPRAMIDE HYDROCHLORIDE 5 MG/ML
INJECTION INTRAMUSCULAR; INTRAVENOUS PRN
Status: DISCONTINUED | OUTPATIENT
Start: 2022-07-26 | End: 2022-07-26 | Stop reason: SDUPTHER

## 2022-07-26 RX ORDER — SODIUM CHLORIDE 9 MG/ML
INJECTION, SOLUTION INTRAVENOUS CONTINUOUS
Status: DISCONTINUED | OUTPATIENT
Start: 2022-07-26 | End: 2022-07-27 | Stop reason: HOSPADM

## 2022-07-26 RX ORDER — SODIUM CHLORIDE 0.9 % (FLUSH) 0.9 %
5-40 SYRINGE (ML) INJECTION EVERY 12 HOURS SCHEDULED
Status: DISCONTINUED | OUTPATIENT
Start: 2022-07-26 | End: 2022-07-26 | Stop reason: HOSPADM

## 2022-07-26 RX ORDER — ONDANSETRON 2 MG/ML
4 INJECTION INTRAMUSCULAR; INTRAVENOUS
Status: DISCONTINUED | OUTPATIENT
Start: 2022-07-26 | End: 2022-07-26 | Stop reason: HOSPADM

## 2022-07-26 RX ORDER — LIDOCAINE HYDROCHLORIDE 20 MG/ML
INJECTION, SOLUTION INTRAVENOUS PRN
Status: DISCONTINUED | OUTPATIENT
Start: 2022-07-26 | End: 2022-07-26 | Stop reason: SDUPTHER

## 2022-07-26 RX ORDER — NALOXONE HYDROCHLORIDE 0.4 MG/ML
INJECTION, SOLUTION INTRAMUSCULAR; INTRAVENOUS; SUBCUTANEOUS PRN
Status: DISCONTINUED | OUTPATIENT
Start: 2022-07-26 | End: 2022-07-27

## 2022-07-26 RX ORDER — ENOXAPARIN SODIUM 100 MG/ML
30 INJECTION SUBCUTANEOUS EVERY 12 HOURS SCHEDULED
Status: DISCONTINUED | OUTPATIENT
Start: 2022-07-27 | End: 2022-07-27 | Stop reason: HOSPADM

## 2022-07-26 RX ORDER — HYOSCYAMINE SULFATE 0.5 MG/ML
250 INJECTION, SOLUTION SUBCUTANEOUS EVERY 4 HOURS PRN
Status: DISCONTINUED | OUTPATIENT
Start: 2022-07-26 | End: 2022-07-27 | Stop reason: HOSPADM

## 2022-07-26 RX ORDER — ROCURONIUM BROMIDE 10 MG/ML
INJECTION, SOLUTION INTRAVENOUS PRN
Status: DISCONTINUED | OUTPATIENT
Start: 2022-07-26 | End: 2022-07-26 | Stop reason: SDUPTHER

## 2022-07-26 RX ORDER — PROPOFOL 10 MG/ML
INJECTION, EMULSION INTRAVENOUS PRN
Status: DISCONTINUED | OUTPATIENT
Start: 2022-07-26 | End: 2022-07-26 | Stop reason: SDUPTHER

## 2022-07-26 RX ORDER — SODIUM CHLORIDE 0.9 % (FLUSH) 0.9 %
5-40 SYRINGE (ML) INJECTION PRN
Status: DISCONTINUED | OUTPATIENT
Start: 2022-07-26 | End: 2022-07-26 | Stop reason: HOSPADM

## 2022-07-26 RX ORDER — MEPERIDINE HYDROCHLORIDE 25 MG/ML
12.5 INJECTION INTRAMUSCULAR; INTRAVENOUS; SUBCUTANEOUS EVERY 5 MIN PRN
Status: DISCONTINUED | OUTPATIENT
Start: 2022-07-26 | End: 2022-07-26 | Stop reason: HOSPADM

## 2022-07-26 RX ORDER — SCOLOPAMINE TRANSDERMAL SYSTEM 1 MG/1
1 PATCH, EXTENDED RELEASE TRANSDERMAL ONCE
Status: DISCONTINUED | OUTPATIENT
Start: 2022-07-26 | End: 2022-07-27 | Stop reason: HOSPADM

## 2022-07-26 RX ORDER — SCOLOPAMINE TRANSDERMAL SYSTEM 1 MG/1
1 PATCH, EXTENDED RELEASE TRANSDERMAL
Status: DISCONTINUED | OUTPATIENT
Start: 2022-07-29 | End: 2022-07-27 | Stop reason: HOSPADM

## 2022-07-26 RX ORDER — MAGNESIUM HYDROXIDE 1200 MG/15ML
LIQUID ORAL PRN
Status: DISCONTINUED | OUTPATIENT
Start: 2022-07-26 | End: 2022-07-26 | Stop reason: HOSPADM

## 2022-07-26 RX ORDER — SODIUM CHLORIDE 0.9 % (FLUSH) 0.9 %
5-40 SYRINGE (ML) INJECTION EVERY 12 HOURS SCHEDULED
Status: DISCONTINUED | OUTPATIENT
Start: 2022-07-26 | End: 2022-07-27 | Stop reason: HOSPADM

## 2022-07-26 RX ORDER — POLYETHYLENE GLYCOL 3350 17 G/17G
17 POWDER, FOR SOLUTION ORAL DAILY PRN
Status: DISCONTINUED | OUTPATIENT
Start: 2022-07-26 | End: 2022-07-27

## 2022-07-26 RX ORDER — FENTANYL CITRATE 50 UG/ML
25 INJECTION, SOLUTION INTRAMUSCULAR; INTRAVENOUS EVERY 5 MIN PRN
Status: DISCONTINUED | OUTPATIENT
Start: 2022-07-26 | End: 2022-07-26 | Stop reason: HOSPADM

## 2022-07-26 RX ORDER — ONDANSETRON 2 MG/ML
INJECTION INTRAMUSCULAR; INTRAVENOUS PRN
Status: DISCONTINUED | OUTPATIENT
Start: 2022-07-26 | End: 2022-07-26 | Stop reason: SDUPTHER

## 2022-07-26 RX ORDER — SODIUM CHLORIDE, SODIUM LACTATE, POTASSIUM CHLORIDE, CALCIUM CHLORIDE 600; 310; 30; 20 MG/100ML; MG/100ML; MG/100ML; MG/100ML
INJECTION, SOLUTION INTRAVENOUS CONTINUOUS
Status: DISCONTINUED | OUTPATIENT
Start: 2022-07-26 | End: 2022-07-26 | Stop reason: HOSPADM

## 2022-07-26 RX ORDER — LABETALOL HYDROCHLORIDE 5 MG/ML
10 INJECTION, SOLUTION INTRAVENOUS
Status: DISCONTINUED | OUTPATIENT
Start: 2022-07-26 | End: 2022-07-26 | Stop reason: HOSPADM

## 2022-07-26 RX ORDER — ONDANSETRON 2 MG/ML
4 INJECTION INTRAMUSCULAR; INTRAVENOUS EVERY 6 HOURS PRN
Status: DISCONTINUED | OUTPATIENT
Start: 2022-07-26 | End: 2022-07-27 | Stop reason: HOSPADM

## 2022-07-26 RX ORDER — ENALAPRILAT 2.5 MG/2ML
1.25 INJECTION INTRAVENOUS EVERY 6 HOURS PRN
Status: DISCONTINUED | OUTPATIENT
Start: 2022-07-26 | End: 2022-07-27 | Stop reason: HOSPADM

## 2022-07-26 RX ORDER — PROCHLORPERAZINE EDISYLATE 5 MG/ML
10 INJECTION INTRAMUSCULAR; INTRAVENOUS EVERY 6 HOURS PRN
Status: DISCONTINUED | OUTPATIENT
Start: 2022-07-26 | End: 2022-07-27 | Stop reason: HOSPADM

## 2022-07-26 RX ORDER — ACETAMINOPHEN 160 MG/5ML
650 SOLUTION ORAL ONCE
Status: COMPLETED | OUTPATIENT
Start: 2022-07-26 | End: 2022-07-26

## 2022-07-26 RX ORDER — SODIUM CHLORIDE 9 MG/ML
INJECTION, SOLUTION INTRAVENOUS PRN
Status: DISCONTINUED | OUTPATIENT
Start: 2022-07-26 | End: 2022-07-26 | Stop reason: HOSPADM

## 2022-07-26 RX ORDER — PROCHLORPERAZINE EDISYLATE 5 MG/ML
5 INJECTION INTRAMUSCULAR; INTRAVENOUS
Status: DISCONTINUED | OUTPATIENT
Start: 2022-07-26 | End: 2022-07-26 | Stop reason: HOSPADM

## 2022-07-26 RX ORDER — SODIUM CHLORIDE, SODIUM LACTATE, POTASSIUM CHLORIDE, CALCIUM CHLORIDE 600; 310; 30; 20 MG/100ML; MG/100ML; MG/100ML; MG/100ML
INJECTION, SOLUTION INTRAVENOUS CONTINUOUS PRN
Status: DISCONTINUED | OUTPATIENT
Start: 2022-07-26 | End: 2022-07-26 | Stop reason: HOSPADM

## 2022-07-26 RX ORDER — BUPIVACAINE HYDROCHLORIDE 5 MG/ML
INJECTION, SOLUTION EPIDURAL; INTRACAUDAL PRN
Status: DISCONTINUED | OUTPATIENT
Start: 2022-07-26 | End: 2022-07-26 | Stop reason: ALTCHOICE

## 2022-07-26 RX ORDER — HYDRALAZINE HYDROCHLORIDE 20 MG/ML
10 INJECTION INTRAMUSCULAR; INTRAVENOUS
Status: DISCONTINUED | OUTPATIENT
Start: 2022-07-26 | End: 2022-07-26 | Stop reason: HOSPADM

## 2022-07-26 RX ORDER — PREGABALIN 150 MG/1
150 CAPSULE ORAL ONCE
Status: COMPLETED | OUTPATIENT
Start: 2022-07-26 | End: 2022-07-26

## 2022-07-26 RX ORDER — GLYCOPYRROLATE 0.2 MG/ML
INJECTION INTRAMUSCULAR; INTRAVENOUS PRN
Status: DISCONTINUED | OUTPATIENT
Start: 2022-07-26 | End: 2022-07-26 | Stop reason: SDUPTHER

## 2022-07-26 RX ORDER — GABAPENTIN 300 MG/1
300 CAPSULE ORAL
Status: DISCONTINUED | OUTPATIENT
Start: 2022-07-26 | End: 2022-07-26

## 2022-07-26 RX ORDER — SODIUM CHLORIDE 0.9 % (FLUSH) 0.9 %
5-40 SYRINGE (ML) INJECTION PRN
Status: DISCONTINUED | OUTPATIENT
Start: 2022-07-26 | End: 2022-07-27 | Stop reason: HOSPADM

## 2022-07-26 RX ORDER — ENOXAPARIN SODIUM 100 MG/ML
40 INJECTION SUBCUTANEOUS ONCE
Status: COMPLETED | OUTPATIENT
Start: 2022-07-26 | End: 2022-07-26

## 2022-07-26 RX ORDER — HYDROMORPHONE HCL 110MG/55ML
PATIENT CONTROLLED ANALGESIA SYRINGE INTRAVENOUS PRN
Status: DISCONTINUED | OUTPATIENT
Start: 2022-07-26 | End: 2022-07-26 | Stop reason: SDUPTHER

## 2022-07-26 RX ADMIN — APREPITANT 80 MG: 40 CAPSULE ORAL at 09:20

## 2022-07-26 RX ADMIN — PREGABALIN 150 MG: 150 CAPSULE ORAL at 09:20

## 2022-07-26 RX ADMIN — HYDROMORPHONE HYDROCHLORIDE 2 MG: 2 INJECTION, SOLUTION INTRAMUSCULAR; INTRAVENOUS; SUBCUTANEOUS at 11:18

## 2022-07-26 RX ADMIN — ENOXAPARIN SODIUM 40 MG: 100 INJECTION SUBCUTANEOUS at 09:37

## 2022-07-26 RX ADMIN — ACETAMINOPHEN 650 MG: 650 SOLUTION ORAL at 09:20

## 2022-07-26 RX ADMIN — Medication: at 13:56

## 2022-07-26 RX ADMIN — GLYCOPYRROLATE 0.2 MG: 0.2 INJECTION INTRAMUSCULAR; INTRAVENOUS at 11:25

## 2022-07-26 RX ADMIN — SUGAMMADEX 200 MG: 100 INJECTION, SOLUTION INTRAVENOUS at 13:18

## 2022-07-26 RX ADMIN — PHENYLEPHRINE HYDROCHLORIDE 25 MCG/MIN: 10 INJECTION, SOLUTION INTRAMUSCULAR; INTRAVENOUS; SUBCUTANEOUS at 12:00

## 2022-07-26 RX ADMIN — METOCLOPRAMIDE 10 MG: 5 INJECTION, SOLUTION INTRAMUSCULAR; INTRAVENOUS at 11:32

## 2022-07-26 RX ADMIN — SODIUM CHLORIDE, PRESERVATIVE FREE 40 MG: 5 INJECTION INTRAVENOUS at 18:27

## 2022-07-26 RX ADMIN — CEFAZOLIN 2000 MG: 2 INJECTION, POWDER, FOR SOLUTION INTRAMUSCULAR; INTRAVENOUS at 11:25

## 2022-07-26 RX ADMIN — SODIUM CHLORIDE, POTASSIUM CHLORIDE, SODIUM LACTATE AND CALCIUM CHLORIDE: 600; 310; 30; 20 INJECTION, SOLUTION INTRAVENOUS at 09:19

## 2022-07-26 RX ADMIN — ROCURONIUM BROMIDE 100 MG: 10 INJECTION INTRAVENOUS at 11:19

## 2022-07-26 RX ADMIN — METOCLOPRAMIDE HYDROCHLORIDE 10 MG: 5 INJECTION INTRAMUSCULAR; INTRAVENOUS at 23:45

## 2022-07-26 RX ADMIN — ONDANSETRON 8 MG: 2 INJECTION INTRAMUSCULAR; INTRAVENOUS at 11:16

## 2022-07-26 RX ADMIN — GLYCOPYRROLATE 0.2 MG: 0.2 INJECTION INTRAMUSCULAR; INTRAVENOUS at 11:16

## 2022-07-26 RX ADMIN — PROPOFOL 200 MG: 10 INJECTION, EMULSION INTRAVENOUS at 11:18

## 2022-07-26 RX ADMIN — LIDOCAINE HYDROCHLORIDE 100 MG: 20 INJECTION, SOLUTION INTRAVENOUS at 11:18

## 2022-07-26 RX ADMIN — PROPOFOL 100 MG: 10 INJECTION, EMULSION INTRAVENOUS at 13:11

## 2022-07-26 RX ADMIN — SODIUM CHLORIDE, SODIUM LACTATE, POTASSIUM CHLORIDE, AND CALCIUM CHLORIDE: .6; .31; .03; .02 INJECTION, SOLUTION INTRAVENOUS at 11:09

## 2022-07-26 RX ADMIN — FAMOTIDINE 20 MG: 10 INJECTION, SOLUTION INTRAVENOUS at 11:16

## 2022-07-26 ASSESSMENT — PAIN SCALES - GENERAL
PAINLEVEL_OUTOF10: 0
PAINLEVEL_OUTOF10: 2
PAINLEVEL_OUTOF10: 0
PAINLEVEL_OUTOF10: 2
PAINLEVEL_OUTOF10: 3

## 2022-07-26 ASSESSMENT — PAIN - FUNCTIONAL ASSESSMENT: PAIN_FUNCTIONAL_ASSESSMENT: 0-10

## 2022-07-26 ASSESSMENT — PAIN DESCRIPTION - LOCATION: LOCATION: ABDOMEN

## 2022-07-26 NOTE — H&P
[place]    [id number]  [facility] Same Day Surgery Update H & P  Department of General Surgery   Surgical Service   Pre-operative History and Physical  Last H & P within the last 30 days. DIAGNOSIS:   Morbid obesity ([address] [E66.01]  Hiatal hernia [K44.9]    Procedure(s):  ROBOTIC ASSISTED LAPAROSCOPIC SLEEVE GASTRECTOMY/ HIATAL HERNIA REPAIR  . History obtained from: Patient interview and EHR     HISTORY OF PRESENT ILLNESS:   The patient a morbidly obese (Body mass index is 38.59 kg/m².), 54 y.o. female who presents today for the above procedure. Patient with a history of multiple weight loss attempts without long term success. Illness Screening: Patient denies fever, chills, worsening cough, or close contact with sick individuals. Past Medical History:        Diagnosis Date    Acid reflux     Anesthesia     SLOW TO WAKE UP    Brain tumor (benign) (HCC)     Chronic GERD     Kidney anomaly, congenital 2006    Kidney function abnormal     undeveloped kidney at birth (pt does not remeber which one)    Kidney stone     Liver spot     Prolonged emergence from general anesthesia     Thyroid disease      Past Surgical History:        Procedure Laterality Date    BACK SURGERY      L4-L5 herniated disc     SECTION      CHOLECYSTECTOMY  2018    HYSTERECTOMY (CERVIX STATUS UNKNOWN)  07/15/2011    laproscopic supracervical hysterectomy    PARTIAL HYSTERECTOMY (CERVIX NOT REMOVED)      TUBAL LIGATION      UPPER GASTROINTESTINAL ENDOSCOPY N/A 2022    EGD BIOPSY performed by Annetta Ramos DO at [address] ENDOSCOPY         Medications Prior to Admission:      Prior to Admission medications    Medication Sig Start Date End Date Taking?  Authorizing Provider   vitamin E 400 UNIT capsule Take 400 Units by mouth daily    Historical Provider, MD   esomeprazole Magnesium (NEXIUM) 20 MG PACK Take 20 mg by mouth daily     Historical Provider, MD   Multiple Vitamins-Minerals (THERAPEUTIC MULTIVITAMIN-MINERALS) tablet Take 1 tablet by mouth daily    Historical Provider, MD   Omega-3 Fatty Acids (FISH OIL) 1000 MG CAPS Take 1,000 mg by mouth daily    Historical Provider, MD         Allergies:  Elemental sulfur, Other, Sulfa antibiotics, Codeine, Pork-derived products, Codeine, and Sulfa antibiotics    PHYSICAL EXAM:      /75   Pulse (!) 49   Temp 97 °F (36.1 °C) (Temporal)   Resp 16   Ht 5' 7\" (1.702 m)   Wt 246 lb 6.4 oz (111.8 kg)   LMP 06/18/2011   SpO2 98%   BMI 38.59 kg/m²      Airway:  Airway patent with no audible stridor    Heart:  Bradycardic, Regular rhythm, no murmur noted    Lungs:  No increased work of breathing, good air exchange, clear to auscultation bilaterally, no crackles or wheezing    Abdomen:  Soft, non-distended, non-tender, no rebound tenderness or guarding, and no masses palpated    ASSESSMENT AND PLAN:    1. The patients history and physical was obtained and signed off by the pre-admission testing department. Patient seen and focused exam done today- no new changes since last physical exam on 7/11/22    2. Access to ancillary services are available per request of the provider.     MICK Lyn - CNP     7/26/2022

## 2022-07-26 NOTE — PROGRESS NOTES
4 Eyes Admission Assessment     I agree as the admission nurse that 2 RN's have performed a thorough Head to Toe Skin Assessment on the patient. ALL assessment sites listed below have been assessed on admission. Areas assessed by both nurses:   [x]   Head, Face, and Ears   [x]   Shoulders, Back, and Chest  [x]   Arms, Elbows, and Hands   [x]   Coccyx, Sacrum, and Ischium  [x]   Legs, Feet, and Heels        Does the Patient have Skin Breakdown?   No         Neftaly Prevention initiated:  NA   Wound Care Orders initiated:  NA      WOC nurse consulted for Pressure Injury (Stage 3,4, Unstageable, DTI, NWPT, and Complex wounds) or Neftaly score 18 or lower:  NA      Nurse 1 eSignature: Electronically signed by Sandra Pineda RN on 7/26/22 at 5:48 PM EDT    **SHARE this note so that the co-signing nurse is able to place an eSignature**    Nurse 2 eSignature: Electronically signed by Ned Chang RN on 7/26/22 at 5:49 PM EDT

## 2022-07-26 NOTE — ANESTHESIA PRE PROCEDURE
Department of Anesthesiology  Preprocedure Note       Name:  Deb Barbour   Age:  54 y.o.  :  1970                                          MRN:  [id number]         Date:  2022      Surgeon: Michael Aguilar): Fady Murguia DO    Procedure: Procedure(s):  ROBOTIC ASSISTED LAPAROSCOPIC SLEEVE GASTRECTOMY/ HIATAL HERNIA REPAIR  . Medications prior to admission:   Prior to Admission medications    Medication Sig Start Date End Date Taking? Authorizing Provider   vitamin E 400 UNIT capsule Take 400 Units by mouth daily    Historical Provider, MD   esomeprazole Magnesium (NEXIUM) 20 MG PACK Take 20 mg by mouth daily     Historical Provider, MD   Multiple Vitamins-Minerals (THERAPEUTIC MULTIVITAMIN-MINERALS) tablet Take 1 tablet by mouth daily    Historical Provider, MD   Omega-3 Fatty Acids (FISH OIL) 1000 MG CAPS Take 1,000 mg by mouth daily    Historical Provider, MD       Current medications:    No current facility-administered medications for this visit. No current outpatient medications on file. Facility-Administered Medications Ordered in Other Visits   Medication Dose Route Frequency Provider Last Rate Last Admin    scopolamine (TRANSDERM-SCOP) transdermal patch 1 patch  1 patch TransDERmal Once [place] Flake, DO   1 patch at 22    ceFAZolin (ANCEF) 2,000 mg in sodium chloride 0.9 % 50 mL IVPB (mini-bag)  2,000 mg IntraVENous Once [place] Flake, DO        lactated ringers infusion   IntraVENous Continuous [place] Flake, DO        lactated ringers infusion   IntraVENous Continuous Honey Abdul,  mL/hr at 22 New Bag at 22       Allergies: Allergies   Allergen Reactions    Sulfa Antibiotics     Codeine     Pork-Derived Products Hives, Diarrhea and Itching     Vomiting.  Pt can eat some pork products    Codeine Nausea And Vomiting    Sulfa Antibiotics Nausea And Vomiting       Problem List:    Patient Active Problem List   Diagnosis Code    Family history of breast cancer in mother Z80.2    Atrophic kidney N26.1    Kidney stones N20.0    Thyroid disease E07.9    Ear problem H93.90    Primary insomnia F51.01    Eustachian tube dysfunction H69.80    Subclinical hyperthyroidism E05.90    Left thyroid nodule E04.1    Brain mass G93.89    Diffuse cystic mastopathy of both breasts N60.12, N60.11    Meningioma (HCC) D32.9    Otalgia H92.09    TMJ dysfunction M26.609    Chronic GERD K21.9    Severe obesity (BMI 35.0-39. 9) with comorbidity ([address] E66.01       Past Medical History:        Diagnosis Date    Acid reflux     Anesthesia     SLOW TO WAKE UP    [facility]     Chronic GERD     Kidney anomaly, congenital 2006    Kidney function abnormal     undeveloped kidney at birth (pt does not remeber which one)    Kidney stone     Liver spot     Prolonged emergence from general anesthesia     Thyroid disease        Past Surgical History:        Procedure Laterality Date    BACK SURGERY      L4-L5 herniated disc     SECTION      CHOLECYSTECTOMY  2018    HYSTERECTOMY (CERVIX STATUS UNKNOWN)  07/15/2011    laproscopic supracervical hysterectomy    PARTIAL HYSTERECTOMY (CERVIX NOT REMOVED)      TUBAL LIGATION      UPPER GASTROINTESTINAL ENDOSCOPY N/A 2022    EGD BIOPSY performed by Jennifer [facility] DO [facility] History:    Social History     Tobacco Use    Smoking status: Former     Packs/day: 0.10     Years: 2.00     Pack years: 0.20     Types: Cigarettes     Quit date:      Years since quittin.5    Smokeless tobacco: Never   Substance Use Topics    Alcohol use: Not Currently     Comment: rare                                Counseling given: Not Answered      Vital Signs (Current): There were no vitals filed for this visit.                                            BP Readings from Last 3 Encounters:   22 114/75   22 118/80   22 123/70       NPO Status: FB   Dental: normal exam         Pulmonary:Negative Pulmonary ROS and normal exam  breath sounds clear to auscultation                             Cardiovascular:  Exercise tolerance: good (>4 METS),           Rhythm: regular  Rate: normal                    Neuro/Psych:   Negative Neuro/Psych ROS              GI/Hepatic/Renal:   (+) GERD: well controlled, morbid obesity          Endo/Other:    (+) hyperthyroidism::., .                 Abdominal:   (+) obese,     Abdomen: soft. Vascular: negative vascular ROS. Other Findings:             Anesthesia Plan      general     ASA 3       Induction: intravenous. MIPS: Postoperative opioids intended and Prophylactic antiemetics administered. Anesthetic plan and risks discussed with patient. Plan discussed with CRNA.     Attending anesthesiologist reviewed and agrees with Preprocedure content                Yazan Rodriguez DO   7/26/2022

## 2022-07-26 NOTE — PROGRESS NOTES
From OR to pacu bay 16 accompanied by [facility] and monitors applied. Intraop meds discussed. Subjective:    Patient is a 59 year old male presenting with rehab back hpi. The history is provided by the patient.   Mati Hernandez is a 59 year old male with a lumbar condition.  Condition occurred with:  Insidious onset.  Condition occurred: for unknown reasons.  This is a recurrent and chronic condition  Patient reports a 2-3 year history of low back pain which he relates to prolonged standing for work as .  Patient felt his left low back pain was improving/ was controlled with meds until about 4 weeks ago.  At that time patient experienced a pinching pain at his left low back with radiation down lateral LE to ankle.  Patient has L5 injection 1-2 weeks ago with some relief.  Symptoms are worse with walking and with standing in partial bending at work..    Patient reports pain:  Lumbar spine right and lumbar spine left.  Radiates to:  Gluteals left, gluteals right, lower leg left, thigh left and foot left.  Pain is described as aching and shooting and is constant and intermittent (constant LB, intermittent LE) and reported as 5/10.   Pain is the same all the time.  Symptoms are exacerbated by bending, walking and standing and relieved by rest (lying down).  Since onset symptoms are unchanged.  Special tests:  MRI.      General health as reported by patient is good.                                              Objective:    Standing Alignment:        Lumbar:  Convex scoliosis L and lordosis decr            Gait:    Gait Type:  Normal   Assistive Devices:  None                 Lumbar/SI Evaluation  ROM:    AROM Lumbar:   Flexion:          Fingertips to mid lower leg with left buttock complaint  Ext:                    Mod loss with bilat Lbp, L buttock pain   Side Bend:        Left:     Right:   Rotation:           Left:     Right:   Side Glide:        Left:  No change    Right:  No change          Lumbar Myotomes:  normal            Lumbar DTR's:  normal          Neural Tension/Mobility:  Lumbar:   Normal        Lumbar Palpation:  Palpation (lumbar): increased muscle holding lumbar paraspinals.                                                           Alycia Lumbar Evaluation        Test Movements:  FIS: During: increases  After: no worse  Pretest Movements: bilat LB/buttock  Repeat FIS: During: increases  After: worse    EIS: During: increases  After: no worse    Repeat EIS: During: decreases  After: better    ERICA: During: no effect  After: no effect    Repeat ERICA: During: no effect  After: no effect    EIL: During: increases  After: no worse    Repeat EIL: During: centralizing  After: better  Mechanical Response: IncROM                                                 ROS    Assessment/Plan:      Patient is a 59 year old male with lumbar complaints.    Patient has the following significant findings with corresponding treatment plan.                Diagnosis 1:  Low back pain  Pain -  manual therapy, self management, education, directional preference exercise and home program  Decreased ROM/flexibility - manual therapy and therapeutic exercise  Decreased strength - therapeutic exercise and therapeutic activities  Decreased function - therapeutic activities    Therapy Evaluation Codes:   1) History comprised of:   Personal factors that impact the plan of care:      None.    Comorbidity factors that impact the plan of care are:      None.     Medications impacting care: None.  2) Examination of Body Systems comprised of:   Body structures and functions that impact the plan of care:      Lumbar spine.   Activity limitations that impact the plan of care are:      Standing, Walking and Working.  3) Clinical presentation characteristics are:   Stable/Uncomplicated.  4) Decision-Making    Low complexity using standardized patient assessment instrument and/or measureable assessment of functional outcome.  Cumulative Therapy Evaluation is: Low complexity.    Previous and current functional limitations:  (See Goal  Flow Sheet for this information)    Short term and Long term goals: (See Goal Flow Sheet for this information)     Communication ability:  Patient appears to be able to clearly communicate and understand verbal and written communication and follow directions correctly.  Treatment Explanation - The following has been discussed with the patient:   RX ordered/plan of care  Anticipated outcomes  Possible risks and side effects  This patient would benefit from PT intervention to resume normal activities.   Rehab potential is good.    Frequency:  1 X week, once daily  Duration:  for 6-10 per MD visits  Discharge Plan:  Achieve all LTG.  Independent in home treatment program.  Reach maximal therapeutic benefit.    Please refer to the daily flowsheet for treatment today, total treatment time and time spent performing 1:1 timed codes.

## 2022-07-26 NOTE — ANESTHESIA POSTPROCEDURE EVALUATION
Department of Anesthesiology  Postprocedure Note    Patient: Robin Weems  MRN: [id number]  YOB: 1970  Date of evaluation: 7/26/2022      Procedure Summary     Date: 07/26/22 Room / Location: [address] [place] [address]    Anesthesia Start: 1112 Anesthesia Stop: 0623    Procedures:       ROBOTIC ASSISTED LAPAROSCOPIC SLEEVE GASTRECTOMY/ HIATAL HERNIA REPAIR (Abdomen)      . (Abdomen) Diagnosis:       Morbid obesity ([address]      Hiatal hernia      (Morbid obesity ([address] [E66.01])      (Hiatal hernia [K44.9])    Surgeons: Eyal Valencia DO Responsible Provider: Briana Echavarria DO    Anesthesia Type: general ASA Status: 3          Anesthesia Type: No value filed.     Suraj Phase I: Suraj Score: 8    Suraj Phase II:        Anesthesia Post Evaluation    Patient location during evaluation: PACU  Patient participation: complete - patient participated  Level of consciousness: awake  Pain score: 4  Airway patency: patent  Nausea & Vomiting: no nausea and no vomiting  Complications: no  Cardiovascular status: hemodynamically stable  Respiratory status: acceptable  Hydration status: stable  Multimodal analgesia pain management approach

## 2022-07-26 NOTE — OP NOTE
[facility]) Physicians   [employer]              Procedure Note    Indications: The patient was evaluated by our multidisciplinary team and was found to be a good candidate for weight loss surgery. BMI 40.12      Pre-operative Diagnosis:   Patient Active Problem List   Diagnosis    Family history of breast cancer in mother    Atrophic kidney    Kidney stones    Thyroid disease    Ear problem    Primary insomnia    Eustachian tube dysfunction    Subclinical hyperthyroidism    Left thyroid nodule    Brain mass    Diffuse cystic mastopathy of both breasts    Meningioma (HCC)    Otalgia    TMJ dysfunction    Hiatal hernia with GERD    Morbid obesity with BMI of 40.0-44.9, adult (HCC)         Post-operative Diagnosis:   Same      Procedure:    - Robotic Sleeve Gastrectomy  - Robotic Hiatal Hernia Repair      Surgeon: Ernst Galvez DO    Anesthesia: General endotracheal anesthesia        Procedure Details   The patient was seen again in the Holding Room. The risks, benefits, complications, treatment options, and expected outcomes were discussed with the patient and/or family. The possibilities of reaction to medication, pulmonary aspiration, perforation of viscus, bleeding, recurrent infection, strictures, leaks, failure to lose weight, regaining weight,  the need for additional procedures, failure to diagnose a condition, and creating a complication requiring transfusion or operation were discussed. There was concurrence with the proposed plan and informed consent was obtained. The site of surgery was properly noted/marked. The patient was taken to Operating Room, identified as Reddy Luthre and the procedure verified as Laparoscopic Sleeve Gastrectomy. A Time Out was held and the above information confirmed. The patient was placed in the supine position and general anesthesia was induced, along with placement of orogastric tube and SCD's. Lovenox SQ given pre-operatively.  IV Antibiotics given pre-operatively. All pressure points were padded properly. A left upper quadrant incision was made and a veres needle was inserted after confirming with saline drop test.  After adequate pneumoperitoneum was obtained, a 5 mm trocar was inserted under direct vision and there was no injury on initial trocar placement. Additional ports were placed in the standard positions under direct vision. The abdomen was initially explored and no abnormalities were identified. A liver retractor was inserted through a small incision in the upper midline, lifted the liver upward, and was then secured to the OR table. The pylorus was identified and measurement was taken approximately 4 cm from the pylorus along the greater curvature of the stomach. The vessel sealer was used to take down the attachments and short gastric vessels along the greater curve of the stomach. This was continued until all attachments were taken down and continued to the gastro-esophageal junction. A 36 German dilator was advanced along the lesser curvature and into the pylorus. A hiatal hernia was identified and decision was made to repair this to help improve patient's GERD and ensure sleeve would not migrate into the chest.  Began the 360-degree dissection by dividing the pars flaccida and mobilizing the esophagus at the right amy, identifying the junction with the left amy lateral to the esophagus. I then divided the phrenoesophageal membrane and the esophagus was freed from both crura and hiatal hernia sac was completely dissected off of the distal esophagus. The dissection was done high into the mediastinum, identifying the aorta as well as the inferior pulmonary vein with complete reduction. The hiatal hernia sac was completely freed off of the left and right parietal pleura and excised. The esophagus was now mobilized at the hiatus and hernia sac was estimated to be about 4cm.     The crura was then closed posterior to the esophagus with multiple interrupted 0.0 ethibond. There was approximately 4 cm of Intraabdominal esophagus. Vagus nerves were protected and away from dissection. Made sure it was not too tight on the esophagus and the Boogie passed easily through it. A stapler was fired along the dilator to create an appropriate sized gastric sleeve pouch. Green firing followed by blue firings were used to create the sleeve. The staple line looked very healthy and no bleeding from staple line. The stomach was confirmed to be completely divided with uniform shape,  no twist in the sleeve and wide patent incisura. A 2-0 PDS suture was used to over-sew and imbricate the sleeve staple line. The staple line was completely over-sewn over dilator. The stomach distal to the staple line was clamped and methylene blue saline was injected under pressure confirming no obstruction and no leak. The abdomen was carefully inspected and there was no bleeding or any other abnormality. The stomach was brought out through the RUQ incision. Hemostasis was confirmed. The 12 port sites was closed using 0.0 Vicryl  suture at the level of the fascia. The trocar site skin wounds were closed using 4.0 Vicryl after copious Irrigation of the wounds. Local Anesthetic used at port sites then Dermabond applied. Instrument, sponge, and needle counts were correct at the conclusion of the case. Findings: Morbid Obesity, large hiatal hernia    Estimated Blood Loss:  Minimal           Drains: none           Specimens: Stomach (Subtotal), hiatal hernia sac           Complications:  None; patient tolerated the procedure well. Disposition: PACU - hemodynamically stable. Condition: stable    Attending Attestation: I was present and scrubbed for the entire procedure.

## 2022-07-26 NOTE — PROGRESS NOTES
PACU Transfer Note    Vitals:    07/26/22 1741   BP:    Pulse:    Resp: 25   Temp:    SpO2: 93%       In: 2477 [I.V.:2427]  Out: 600 [Urine:600]    Pain assessment:  level of pain (1-10, 10 severe), 2/10 abdomen tolerable. VS stable. Report given to Tyshawn Armenta RN at bedside. Family called and updated. Patient to transfer to room # [phone] as scheduled.    Pain Level: 2    Report given to Receiving unit RN.    7/26/2022 5:46 PM

## 2022-07-26 NOTE — PROGRESS NOTES
Pt resting comfortably in room. 2 IV's started with LR running. Ancef on call to OR. Pre-Op meds given. Dr. Pato Tejada consulted about lovenox and pork allergy. He stated we will continue to give it and watch for itching or hives. (Heparin also is contraindicated with pork). The Pharmacist aware and stated that Lovenox is the more refined pork additive. The lovenox was given in the right arm. Two belongings bags to go to locked room. Daughter Rosalind Aguilera not here at this time. Her number is on chart. Consents signed and on chart.

## 2022-07-26 NOTE — DISCHARGE INSTRUCTIONS
Bariatric Surgery Dietary Discharge Instructions    You will be consuming clear liquids on post-op days 1 and 2. You need to drink at least 48-64oz of fluid daily for hydration. This will mean sipping fluids all day to prevent dehydration and the possibility of needing IV fluids for rehydration! Post-op Day 1  And 2   Wednesday & Thursday    Fluid Intake: Minimum of 48 oz of fluid each day, more as you are able. Sip fluids slowly  Do not eat and drink at the same time    All of the fluids you consume from now on will be non-carbonated, caffeine free and sugar-free. Drinks should contain 5 grams of sugar or less per 8 oz. serving. Refer to Clear Liquids List for a list of acceptable clear liquids. On post-op day 3 you can begin full liquids and pureed/blenderized foods. Post-op Day 3 Friday    Still make sure you are getting at least 48oz of fluids daily. Take your 1 oz medicine cups home with you and continue to use them. Protein intake goal is 60-80 grams per day. Use only protein supplements that have whey protein isolate or soy protein isolate listed as the first ingredient. Eat 4-6 mini-meals a day. Drink 2 protein shakes daily and they count as food, one of the 4-6 meals daily. Each meal should contain 1-2 oz. of pureed food. To puree a food, put it in a  and blend it until there are no chunky pieces remaining. You may need to add a little broth or other moisture to achieve a smooth texture. Food Diary  When you start the pureed/blenderized foods, begin keeping a food diary that includes all of the food and liquids you consume each day. Note how much protein is in each food. Bring your food diary with you to your post-op visits so that we may review your intake. Refer to handouts from preoperative teaching class for a list of clear liquids and pureed/blenderized foods.     If you have any questions about your diet when you get home, please call the office at [phone]. Vitamins & Supplements  Begin taking your chewable multivitamins (Fusion) FOUR per day on Monday after your surgery. We will want to know you are taking the vitamins when you come in for your post op visits. MEDICATIONS:  please review this closely as your medications have changed as a result of your surgery. Unless otherwise noted you will crush all your medications for the first 2 weeks post op and mix them with clears for the first 2 days and then with your pureed food for the remainder of the 2 weeks. I have written for Prilosec (unless you were already on a PPI) which you can open and mix as instructed above and then after two weeks take whole pill. After two weeks you may start taking your pills whole. Please make sure when taking whole pills you do not take them all at once. Take them one at a time and allow a minute or so between each one. Percocet for acute surgical pain. If you were on pain medication before your surgery: Do not take percocet with your other pain prescriptions. Always wean yourself off pain medications as soon as you can to avoid risk of opioid addiction. Prescription has been given for pain medication. Take as needed for pain control. For additional information regarding safe and effective pain control following surgery, please visit facs. org/safepaincontrol  Medications:  1. Zofran - these pills are taken for nausea. - take one pill every 6 hours as needed. 2. Levsin - this is taken for gas pain and cramping.  - dissolve 0.125 mg tablet under your tongue every 4-6 hours as needed. 3. Omeprazole- this medication reduces stomach acid.   -since you had a SLEEVE, take one 40 mg pill, open capsule and sprinkle in sugar free applesauce or water, per day for 6 weeks    4. Oxycodone/Roxicodone/Percocet - this medication is taken for pain.   - take 1 tablet every 6 hours as needed-crush medication for the first two weeks    Blood Pressure    Following surgery monitor your blood pressure to evaluate for low blood pressure. If your blood pressure is lower than normal and/or youre experiencing symptoms of hypotension such as fatigue, blurred vision, lightheadedness or dizziness please contact your PCP for them to evaluate the need to lower or discontinue your blood pressure medication(s). - Follow up with your primary care physician in 1-2 weeks after discharge to assess if you need adjustments to your blood pressure medications. Activity  You are encouraged to walk through the entire postoperative period as this will help with preventing clots and also help with preventing constipation. You are restricted from lifting anything greater than 10 lbs for the first 6 weeks after surgery. You may shower, but should not get into baths, pools or hot tubs until the provider releases you to do so. Constipation  Constipation is a common issue following surgery. This is due to anesthesia, decreased fluids and fiber in your diet, drinking protein shakes and taking pain medication. Make sure you are getting 48-64 ounces of fluids per day and walking. If you develop constipation you may use docusate sodium (Colace) which is an over-the-counter stool softener and can be taken 1-2 times per day and/or you can use Miralax which is an over-the-counter laxative and can be taken on a daily basis. If this does not help please call the office for further instructions. PLEASE CONTACT YOUR PRIMARY CARE PHYSICIAN AND/OR ENDOCRINOLOGIST AT DISCHARGE AND COME UP WITH A PLAN FOR THE MANAGEMENT OF YOUR BLOOD PRESSURE AND BLOOD SUGAR MEDICATIONS. IF YOU DO NOT HAVE A WAY TO CHECK YOUR BLOOD PRESSURE AT HOME I SUGGEST THAT YOU  A BLOOD PRESSURE CUFF AT THE STORE TO MONITOR YOUR BLOOD PRESSURE SO THAT YOUR PRIMARY CARE PHYSICIAN CAN EVALUATE THE NEED TO CHANGE ANY OF YOUR MEDICATIONS.    - No lifting >8lbs or a gallon of milk for 2 weeks.    - No driving while on narcotics  Otherwise, you can drive when you can sit comfortably behind the steering wheel and can slam on the brake without it hurting or turn the wheel sharply without it hurting. Practice this when sitting the car with it parked in your driveway before trying to drive. --use Incentive spirometer (IS-the breathing thing that the hospital gave you) every couple of hours. This will help prevent you from getting pneumonia. You want to do this for the next couple of weeks until you can take deep breaths without it hurting.      - May shower, let water run over incision sites. No soaking in tub, hot tub, or pool. Do not submerge incision site in water.   - Notify MD immediately if experierencing fevers/chills, nausea/vomiting, pus-like discharge from incision sites, redness swelling, or warmth to incisions.       - Follow up with Dr. Charly Horta in 2 weeks - call for appointment. If you have any trouble maintaining your fluid intake, please contact the office at [phone] so that we can assess your need for IV fluids and vitamins. If you have a problem/complication please call the office or if it is an emergency, please return to the hospital where your surgery was performed.

## 2022-07-27 VITALS
WEIGHT: 246.4 LBS | HEIGHT: 67 IN | HEART RATE: 71 BPM | DIASTOLIC BLOOD PRESSURE: 87 MMHG | BODY MASS INDEX: 38.67 KG/M2 | OXYGEN SATURATION: 97 % | TEMPERATURE: 98.7 F | SYSTOLIC BLOOD PRESSURE: 133 MMHG | RESPIRATION RATE: 16 BRPM

## 2022-07-27 LAB
ANION GAP SERPL CALCULATED.3IONS-SCNC: 9 MMOL/L (ref 3–16)
BUN BLDV-MCNC: 6 MG/DL (ref 7–20)
CALCIUM SERPL-MCNC: 8.5 MG/DL (ref 8.3–10.6)
CHLORIDE BLD-SCNC: 101 MMOL/L (ref 99–110)
CO2: 23 MMOL/L (ref 21–32)
CREAT SERPL-MCNC: 0.6 MG/DL (ref 0.6–1.1)
GFR AFRICAN AMERICAN: >60
GFR NON-AFRICAN AMERICAN: >60
GLUCOSE BLD-MCNC: 116 MG/DL (ref 70–99)
HCT VFR BLD CALC: 38.6 % (ref 36–48)
HEMOGLOBIN: 13.3 G/DL (ref 12–16)
MCH RBC QN AUTO: 30 PG (ref 26–34)
MCHC RBC AUTO-ENTMCNC: 34.4 G/DL (ref 31–36)
MCV RBC AUTO: 87.4 FL (ref 80–100)
PDW BLD-RTO: 13.6 % (ref 12.4–15.4)
PLATELET # BLD: 168 K/UL (ref 135–450)
PMV BLD AUTO: 9.2 FL (ref 5–10.5)
POTASSIUM SERPL-SCNC: 3.7 MMOL/L (ref 3.5–5.1)
RBC # BLD: 4.42 M/UL (ref 4–5.2)
SODIUM BLD-SCNC: 133 MMOL/L (ref 136–145)
WBC # BLD: 10.1 K/UL (ref 4–11)

## 2022-07-27 PROCEDURE — 6360000002 HC RX W HCPCS: Performed by: SURGERY

## 2022-07-27 PROCEDURE — 85027 COMPLETE CBC AUTOMATED: CPT

## 2022-07-27 PROCEDURE — 36415 COLL VENOUS BLD VENIPUNCTURE: CPT

## 2022-07-27 PROCEDURE — 6360000002 HC RX W HCPCS: Performed by: NURSE PRACTITIONER

## 2022-07-27 PROCEDURE — 2580000003 HC RX 258: Performed by: SURGERY

## 2022-07-27 PROCEDURE — 6370000000 HC RX 637 (ALT 250 FOR IP): Performed by: NURSE PRACTITIONER

## 2022-07-27 PROCEDURE — 2580000003 HC RX 258: Performed by: NURSE PRACTITIONER

## 2022-07-27 PROCEDURE — A4216 STERILE WATER/SALINE, 10 ML: HCPCS | Performed by: SURGERY

## 2022-07-27 PROCEDURE — 80048 BASIC METABOLIC PNL TOTAL CA: CPT

## 2022-07-27 PROCEDURE — C9113 INJ PANTOPRAZOLE SODIUM, VIA: HCPCS | Performed by: SURGERY

## 2022-07-27 RX ORDER — ONDANSETRON 4 MG/1
4 TABLET, FILM COATED ORAL EVERY 6 HOURS PRN
Qty: 30 TABLET | Refills: 0 | Status: SHIPPED | OUTPATIENT
Start: 2022-07-27

## 2022-07-27 RX ORDER — OMEPRAZOLE 20 MG/1
20 CAPSULE, DELAYED RELEASE ORAL
Qty: 60 CAPSULE | Refills: 0 | Status: SHIPPED | OUTPATIENT
Start: 2022-07-27

## 2022-07-27 RX ORDER — HYOSCYAMINE SULFATE 0.12 MG/1
125 TABLET SUBLINGUAL EVERY 4 HOURS PRN
Qty: 30 EACH | Refills: 0 | Status: SHIPPED | OUTPATIENT
Start: 2022-07-27

## 2022-07-27 RX ORDER — OXYCODONE HCL 5 MG/5 ML
5 SOLUTION, ORAL ORAL EVERY 4 HOURS PRN
Status: DISCONTINUED | OUTPATIENT
Start: 2022-07-27 | End: 2022-07-27 | Stop reason: HOSPADM

## 2022-07-27 RX ORDER — LIDOCAINE 4 G/G
1 PATCH TOPICAL DAILY
Status: DISCONTINUED | OUTPATIENT
Start: 2022-07-27 | End: 2022-07-27 | Stop reason: HOSPADM

## 2022-07-27 RX ORDER — OXYCODONE HCL 5 MG/5 ML
10 SOLUTION, ORAL ORAL EVERY 4 HOURS PRN
Status: DISCONTINUED | OUTPATIENT
Start: 2022-07-27 | End: 2022-07-27 | Stop reason: HOSPADM

## 2022-07-27 RX ORDER — METHOCARBAMOL 750 MG/1
750 TABLET, FILM COATED ORAL 4 TIMES DAILY
Qty: 40 TABLET | Refills: 0 | Status: SHIPPED | OUTPATIENT
Start: 2022-07-27 | End: 2022-08-06

## 2022-07-27 RX ORDER — OXYCODONE HYDROCHLORIDE AND ACETAMINOPHEN 5; 325 MG/1; MG/1
1 TABLET ORAL EVERY 6 HOURS PRN
Qty: 28 TABLET | Refills: 0 | Status: SHIPPED | OUTPATIENT
Start: 2022-07-27 | End: 2022-08-03

## 2022-07-27 RX ADMIN — METHOCARBAMOL 1000 MG: 100 INJECTION INTRAMUSCULAR; INTRAVENOUS at 15:49

## 2022-07-27 RX ADMIN — SODIUM CHLORIDE: 9 INJECTION, SOLUTION INTRAVENOUS at 06:45

## 2022-07-27 RX ADMIN — OXYCODONE HYDROCHLORIDE 5 MG: 5 SOLUTION ORAL at 06:41

## 2022-07-27 RX ADMIN — OXYCODONE HYDROCHLORIDE 5 MG: 5 SOLUTION ORAL at 12:49

## 2022-07-27 RX ADMIN — SODIUM CHLORIDE: 9 INJECTION, SOLUTION INTRAVENOUS at 14:15

## 2022-07-27 RX ADMIN — ENOXAPARIN SODIUM 30 MG: 100 INJECTION SUBCUTANEOUS at 08:20

## 2022-07-27 RX ADMIN — SODIUM CHLORIDE, PRESERVATIVE FREE 40 MG: 5 INJECTION INTRAVENOUS at 08:10

## 2022-07-27 RX ADMIN — ONDANSETRON 4 MG: 2 INJECTION INTRAMUSCULAR; INTRAVENOUS at 08:21

## 2022-07-27 RX ADMIN — METHOCARBAMOL 1000 MG: 100 INJECTION INTRAMUSCULAR; INTRAVENOUS at 08:15

## 2022-07-27 ASSESSMENT — PAIN SCALES - GENERAL
PAINLEVEL_OUTOF10: 3
PAINLEVEL_OUTOF10: 4
PAINLEVEL_OUTOF10: 3

## 2022-07-27 ASSESSMENT — PAIN - FUNCTIONAL ASSESSMENT: PAIN_FUNCTIONAL_ASSESSMENT: PREVENTS OR INTERFERES SOME ACTIVE ACTIVITIES AND ADLS

## 2022-07-27 ASSESSMENT — PAIN DESCRIPTION - LOCATION
LOCATION: ABDOMEN
LOCATION: ABDOMEN

## 2022-07-27 ASSESSMENT — PAIN DESCRIPTION - DESCRIPTORS: DESCRIPTORS: ACHING

## 2022-07-27 NOTE — DISCHARGE SUMMARY
Patient ID:  Leni Herndon  [id number]  53 y.o.  1970    Admit date: 7/26/2022    Discharge date and time: 7/27/22    Admitting Physician: Sami James DO     Discharge Physician: same    Admission Diagnoses: Morbid obesity ([facility] [E66.01]  Hiatal hernia [K44.9]  Morbid obesity with BMI of 40.0-44.9, adult ([facility] [E66.01, Z68.41]    Discharge Diagnoses: same    Admission Condition: fair    Discharged Condition: stable    Indication for Admission: Surgery: Robotic assisted Laparoscopic Sleeve Gastrectomy, IV hydration, monitoring, pain and nausea management    Hospital Course: 54 y.o. female admitted with morbid obesity who underwent Robotic assisted laparoscopic sleeve gastrectomy, Hiatal Hernia Repair. Surgery was uneventful and she was admitted to bariatric post-operative surgical floor in stable condition for IV hydration, monitoring and pain and nausea management. The following morning the pain was tolerable on po pain medication and was taking adequate po. She was discharged in stable condition. Treatments: IV hydration        Disposition: home    Patient Instructions: Activity: activity as tolerated and no driving while on analgesics  Diet: clear liquids  Wound Care: as directed    Follow-up with Dr. Aidee Leung in two weeks.         Signed:  MICK Giron CNP  7/27/2022  2:34 PM

## 2022-07-27 NOTE — PROGRESS NOTES
Pt alert and oriented. VSS, Pt voiding adequately. Ambulates with a steady gait. Reglan given for nausea. Call light within reach. Will continue to monitor

## 2022-07-27 NOTE — PLAN OF CARE
Patient tolerating bariatric clear liquid diet. Lap sites, abdomen glued, cdi, jessica. Surgical pain controlled with oxycodone. New orders for discharge. Reviewed discharge instructions, verbalized understanding.

## 2022-07-27 NOTE — CARE COORDINATION
Case Management Assessment            Discharge Note                    Date / Time of Note: 7/27/2022 2:37 PM                  Discharge Note Completed by: Shira Brock RN    Patient Name: Jhon Bolaños   YOB: 1970  Diagnosis: Morbid obesity ([facility] [E66.01]  Hiatal hernia [K44.9]  Morbid obesity with BMI of 40.0-44.9, adult ([facility] [E66.01, Z68.41]   Date / Time: 7/26/2022  8:19 AM    Current PCP: Janell Fischer DO  Clinic patient: No    Hospitalization in the last 30 days: No    Advance Directives:  Code Status: Full Code  [place] DNR form completed and on chart: Not Indicated    Financial:  Payor: Norman Solis 150 / Plan: [employer] PPO / Product Type: *No Product type* /      Pharmacy:    [facility] [address] [facility]  Phone: [phone] Fax: [phone]      Assistance purchasing medications?:    Assistance provided by Case Management: None at this time    Does patient want to participate in local refill/ meds to beds program?: Yes    Meds To Beds General Rules:  1. Can ONLY be done Monday- Friday between 8:30am-5pm  2. Prescription(s) must be in pharmacy by 3pm to be filled same day  3. Copy of patient's insurance/ prescription drug card and patient face sheet must be sent along with the prescription(s)  4. Cost of Rx cannot be added to hospital bill. If financial assistance is needed, please contact unit  or ;  or  CANNOT provide pharmacy voucher for patients co-pays  5.  Patients can then  the prescription on their way out of the hospital at discharge, or pharmacy can deliver to the bedside if staff is available. (payment due at time of pick-up or delivery - cash, check, or card accepted)     Able to afford home medications/ co-pay costs: Yes    ADLS:  Current PT AM-PAC Score:   /24  Current OT AM-PAC Score:   /24      DISCHARGE Disposition: Home- No Services Needed    LOC at discharge: Not Applicable  KRISTEL Completed: Not Indicated    Notification completed in HENS/PAS?:  Not Applicable    IMM Completed:   Not Indicated    Transportation:  Transportation PLAN for discharge: family   Mode of Transport: [facility]  ordered at discharge: Not [address]: Not Applicable  Orders faxed: No    Durable Medical Equipment:  DME Provider: none  Equipment obtained during hospitalization:     Home Oxygen and Respiratory Equipment:  Oxygen needed at discharge?: Not [address]: Not Applicable  Portable tank available for discharge?: Not Indicated    Dialysis:  Dialysis patient: No    Dialysis Center:  Not Applicable      Additional CM Notes: Pt from home will Dc home with family support no needs at DC from [facility].  will follow up OP with surgical team.     The Plan for Transition of Care is related to the following treatment goals of Morbid obesity ([facility] [E66.01]  Hiatal hernia [K44.9]  Morbid obesity with BMI of 40.0-44.9, adult ([facility] [E66.01, Z68.41]    The Patient and/or patient representative Alen Jim and her family were provided with a choice of provider and agrees with the discharge plan Yes    Freedom of choice list was provided with basic dialogue that supports the patient's individualized plan of care/goals and shares the quality data associated with the providers.  Not Indicated    Care Transitions patient: Yes    Cherylynn Mcardle, RN  [facility]  Case Management Department  Ph: [phone]  Fax: [phone]

## 2022-07-27 NOTE — PROGRESS NOTES
Surgery Daily Progress Note  Vaibhav Hassan  CC: Morbid Obesity  Subjective :  No emesis overnight. Some nausea. Rating pain 3/10 but states she is very uncomfortable. Has been up OOB and ambulating. Objective    Infusions:   sodium chloride 150 mL/hr at 07/26/22 1827    sodium chloride          I/O:I/O last 3 completed shifts: In: 26 [P.O.:60; I.V.:2427; IV Piggyback:50]  Out: 600 [Urine:600]           Wt Readings from Last 1 Encounters:   07/26/22 246 lb 6.4 oz (111.8 kg)               Exam:/77   Pulse 98   Temp 98.6 °F (37 °C) (Oral)   Resp 19   Ht 5' 7\" (1.702 m)   Wt 246 lb 6.4 oz (111.8 kg)   LMP 06/18/2011   SpO2 93%   BMI 38.59 kg/m²   General appearance: alert, appears stated age and cooperative  Lungs: clear to auscultation bilaterally  Heart: regular rate and rhythm, S1, S2 normal, no murmur, click, rub or gallop  Abdomen: soft, appropriately-tender; bowel sounds quiet  Trocar sites well approx      ASSESSMENT/PLAN: Pt. is a 54 y.o. female s/p Robotic Assisted Laparoscopic Sleeve Gastrectomy, HHR POD# 1    Add Robaxin  Add lidoderm patch  Start on Bariatric Clears  Pharmacy to review home meds -pills to be crushed for 2 weeks post op    Monitor progress throughout the day. D/C late afternoon as long as able to take adequate po to remain hydrated without IVF, voiding, pain and nausea tolerable with oral meds, using IS frequently and ambulating.     D/W Dr. Darcey Kanner, APRN - CNP 7/27/2022 6:24 AM  [phone]

## 2022-08-01 ENCOUNTER — TELEPHONE (OUTPATIENT)
Dept: BARIATRICS/WEIGHT MGMT | Age: 52
End: 2022-08-01

## 2022-08-01 NOTE — TELEPHONE ENCOUNTER
Surgery Type: Sleeve, hiatal hernia repair    Surgery Date: 7/26/22    Surgeon: Erica Ward    The patient was contacted to follow up on their recent bariatric surgery. The following topics were reviewed:    [] Hydration is Adequate            --Patient is getting at least 48-64 oz of fluids a day, not including protein shakes. 32 oz crystal light/ water    [x]Consuming Adequate Protein         [x]Consuming 2 protein shakes a day 8 oz 1% milk                [x]Consuming 60-80 grams of protein a day    [x] Food intake is appropriate  [x]Adequately pureeing foods, so that there are no chunks left. Cream soup w/ peas/carrots/chix   [x]Taking in 1-2 oz at a time 2 oz  [x] Eating 4-6 times a day 2X yesterday   [x] Following the 30-30-30 rule  [x] Reminded patient to keep food diary to bring to their 2 week follow up appointment. [x] Pain relief techniques utilized  [x] Taking pain medication as prescribed  [] Utilizing Lidoderm patches (if prescribed) Recommended if needed  []Taking Tylenol instead of prescription pain medication Recommended if needed  [x] Wearing abdominal binder  [x] Using ice for incisional pain L upper incision is more sore than others, denies redness or drainage    [x] Activity is appropriate  [x] Walking 10 minutes out of every hour  [x]Avoiding heavy lifting (>10lbs)  [x] Utilizing their incentive spirometer    [] Issues with Nausea/Vomiting/Reflux  [] Using Zofran PRN for nausea/vomiting No issues  [x]Taking Prilosec for reflux    [] Issues with Constipation No BM; was planning to use laxative today; denies cramping, bloating, pain; discouraged use at this time  []Tried Colace  []Tried Miralax    All questions and concerns addressed including adequate hydration, 303/30/30 spacing, and pureed portions. Set goal to increase fluids to 48 oz plain water/CL and increase pureed foods up to 4X/day as tolerated. Pt verbalized understanding and has no further questions at this time.

## 2022-08-03 NOTE — TELEPHONE ENCOUNTER
Followed-up with pt. She reports now reaching 48-52 oz water and CL for past 2 days. Still able to drink 2 protein shakes + some pureed food. C/o nausea related to Fusion. Discussed taking crushed with food and waiting until mid-day vs having first thing in AM. Pt verbalized understanding and has no further questions at this time.

## 2022-08-10 ENCOUNTER — OFFICE VISIT (OUTPATIENT)
Dept: BARIATRICS/WEIGHT MGMT | Age: 52
End: 2022-08-10

## 2022-08-10 VITALS
WEIGHT: 233 LBS | DIASTOLIC BLOOD PRESSURE: 71 MMHG | HEIGHT: 68 IN | SYSTOLIC BLOOD PRESSURE: 117 MMHG | HEART RATE: 51 BPM | BODY MASS INDEX: 35.31 KG/M2

## 2022-08-10 DIAGNOSIS — Z98.84 STATUS POST LAPAROSCOPIC SLEEVE GASTRECTOMY: Primary | ICD-10-CM

## 2022-08-10 PROCEDURE — 99024 POSTOP FOLLOW-UP VISIT: CPT | Performed by: SURGERY

## 2022-08-10 NOTE — PATIENT INSTRUCTIONS
Patient received dietary handouts and education.     Goals:   - Eat 4x/day w/ 2 protein shakes  - Limit portions to 2oz at this time  - Choose alternative multi-vitamin regimen (provided handout)  - Move to phase 3 guidelines on 8/16/22

## 2022-09-04 NOTE — PROGRESS NOTES
Dietary Assessment Note  Vitals:   Vitals:    08/10/22 1014   BP: 117/71   Pulse: 51   Weight: 233 lb (105.7 kg)   Height: 5' 7.5\" (1.715 m)   Patient lost 22 lbs since pre-op. Total Weight Loss: 27 lbs    Labs reviewed: no new labs    Protein intake: 60-80 grams/day - 2 protein shakes w/ 8oz 1% milk    Fluid intake: 48-64 oz/day - CL / water / diet snapple    Multivitamin/mineral intake:  up to 2 fusion daily, they make her sick    Calcium intake: no    Other: none    Exercise:  up moving around, no strenuous exercise    Nutrition Assessment: 2 week post-op visit. Pt is eating pureed food 4-6x/day. Pt has tried: cream of wheat, applesauce, pureed veggies, cream soup & CC.     Amount able to eat per sittin-4oz    Following  rule: yes    Food Intolerances/issues: none    Client Concerns: some pain/soreness upper L quandrant    Goals:   - Eat 4x/day w/ 2 protein shakes  - Limit portions to 2oz at this time  - Choose alternative multi-vitamin regimen (provided handout)  - Move to phase 3 guidelines on 22    Plan: f/u at 6 weeks post-op OR as directed    Chip CODI Jo, JATINDER
Patient doing well  No N/V/F/C  Tolerating phase 2  Educated on phase 3    2 weeks s/p Sleeve gastrectomy    F/U 4 weeks     Megan Tyler
09-Feb-2020 01:20

## 2022-09-07 ENCOUNTER — OFFICE VISIT (OUTPATIENT)
Dept: BARIATRICS/WEIGHT MGMT | Age: 52
End: 2022-09-07

## 2022-09-07 VITALS — BODY MASS INDEX: 34.1 KG/M2 | WEIGHT: 225 LBS | HEIGHT: 68 IN

## 2022-09-07 DIAGNOSIS — Z98.84 STATUS POST LAPAROSCOPIC SLEEVE GASTRECTOMY: Primary | ICD-10-CM

## 2022-09-07 PROCEDURE — 99024 POSTOP FOLLOW-UP VISIT: CPT | Performed by: SURGERY

## 2022-09-07 NOTE — PROGRESS NOTES
Dietary Assessment Note    Vitals:   Vitals:    09/07/22 0734   Weight: 225 lb (102.1 kg)   Height: 5' 7.5\" (1.715 m)    Patient lost 8 lbs over 1 month. Total Weight Loss: 35#    Labs reviewed: no lab studies available for review at time of visit    Protein intake: 60-80 grams/day     Fluid intake: 48-64 oz/day    Multivitamin/mineral intake:  Fusion - how many/day: 2    Calcium intake: None    Other: None    Exercise: Yes. How much: Walking at work a lot and walking on her street. Nutrition Assessment: 6 post-op visit. Still 1 shake in the afternoon  Breakfast: drinks decaf coffee  Then COW a while later (30-30-30)    Snack: None    Lunch: cream of chicken + veggies    Snack: CC (LF)     Dinner: roast beef/chicken sometimes with veggie    Snack: HB egg    30-30-30:  Following  Amount at a time: 4 oz (1/2 cup)    Food Intolerances/issues:  tuna - tried twice    Client Concerns: None    Goals:   Protein focus with B/L  Advance to phase 4  Start MVI alternative    Plan: F/u at 14 weeks post op      Shanti Burris RD, LD

## 2022-09-07 NOTE — PROGRESS NOTES
[facility]) Physicians   General & Laparoscopic Surgery  [employer]       HPI:     Fernanda Rangel is a very pleasant 54 y.o. obese female , Body mass index is 34.72 kg/m². Algis Broaden And multiple medical problems who is presenting for bariatric follow up care. Fernanda Rangel is s/p laparoscopic sleeve gastrectomy by me   Comes today to the clinic without any complaints. Patient denies any nausea, vomiting, fevers, chills, shortness of breath, chest pain, constipation or urinary symptoms. Denies any heartburn nor dysphagia. Patient is feeling very well, and is very active. Patient is very pleased with the weight loss and resolution of co-morbid conditions. Past Medical History:   Diagnosis Date    Acid reflux     Anesthesia     SLOW TO WAKE UP    Brain tumor (benign) (HCC)     Chronic GERD     Kidney anomaly, congenital     Kidney function abnormal     undeveloped kidney at birth (pt does not remeber which one)    Kidney stone     Liver spot     Prolonged emergence from general anesthesia     Thyroid disease      Past Surgical History:   Procedure Laterality Date    BACK SURGERY      L4-L5 herniated disc     SECTION      CHOLECYSTECTOMY  2018    HIATAL HERNIA REPAIR N/A 2022    .  performed by Shaista Tanner DO at [place] [address] ([address] [place] [address])  07/15/2011    laproscopic supracervical hysterectomy    PARTIAL HYSTERECTOMY (CERVIX NOT REMOVED)      SLEEVE GASTRECTOMY N/A 2022    ROBOTIC ASSISTED LAPAROSCOPIC SLEEVE GASTRECTOMY/ HIATAL HERNIA REPAIR performed by Shaista Tanner DO at [address] [place] [address] ENDOSCOPY N/A 2022    EGD BIOPSY performed by Shaista Tanner DO at [facility] ENDOSCOPY     Family History   Problem Relation Age of Onset    Cancer Mother         BREAST    Kidney Disease Mother     Cancer Father         PROSTATE    Stroke Father      Social History     Tobacco Use    Smoking status: Former     Packs/day: 0.10     Years: 2.00     Pack years: 0.20     Types: Cigarettes     Quit date: 12     Years since quittin.7    Smokeless tobacco: Never   Substance Use Topics    Alcohol use: Not Currently     Comment: rare     I counseled the patient on the importance of not smoking and risks of ETOH. Allergies   Allergen Reactions    Sulfa Antibiotics     Codeine     Pork-Derived Products Hives, Diarrhea and Itching     Vomiting. Pt can eat some pork products    Codeine Nausea And Vomiting    Sulfa Antibiotics Nausea And Vomiting     Vitals:    22 0734   Weight: 225 lb (102.1 kg)   Height: 5' 7.5\" (1.715 m)       Body mass index is 34.72 kg/m². Current Outpatient Medications:     Multiple Vitamin (MULTIVITAMIN, BARIATRIC FUSION COMPLETE, CHEW TAB), Take 2 tablets by mouth in the morning., Disp: , Rfl:     omeprazole (PRILOSEC) 20 MG delayed release capsule, Take 1 capsule by mouth in the morning and 1 capsule in the evening. Take before meals.  Open capsule and mix with water for the first two weeks while meds need to be crushed., Disp: 60 capsule, Rfl: 0    Hyoscyamine Sulfate SL (LEVSIN/SL) 0.125 MG SUBL, Place 125 mcg under the tongue every 4 hours as needed (spasms), Disp: 30 each, Rfl: 0    ondansetron (ZOFRAN) 4 MG tablet, Take 1 tablet by mouth every 6 hours as needed for Nausea or Vomiting, Disp: 30 tablet, Rfl: 0    vitamin E 400 UNIT capsule, Take 400 Units by mouth daily, Disp: , Rfl:     Multiple Vitamins-Minerals (THERAPEUTIC MULTIVITAMIN-MINERALS) tablet, Take 1 tablet by mouth daily, Disp: , Rfl:     Omega-3 Fatty Acids (FISH OIL) 1000 MG CAPS, Take 1,000 mg by mouth daily, Disp: , Rfl:       Review of Systems - History obtained from the patient  General ROS: negative  Psychological ROS: negative  Hematological and Lymphatic ROS: negative  Endocrine ROS: negative  Respiratory ROS: negative  Cardiovascular ROS: negative  Gastrointestinal ROS:negative  Genito-Urinary ROS: negative  Musculoskeletal ROS: negative Skin ROS: negative    Physical Exam   Vitals Reviewed   Constitutional: Patient is oriented to person, place, and time. Patient appears well-developed and well-nourished. Patient is active and cooperative. Non-toxic appearance. No distress. Neck: Trachea normal and normal range of motion. No JVD present. Pulmonary/Chest: Effort normal. No accessory muscle usage or stridor. No apnea. No respiratory distress. Cardiovascular: Normal rate and no JVD. Abdominal: Normal appearance. Patient exhibits no distension. Abdomen is soft, obese, non tender. Musculoskeletal: Normal range of motion. Patient exhibits no edema. Neurological: Patient is alert and oriented to person, place, and time. Patient has normal strength. GCS eye subscore is 4. GCS verbal subscore is 5. GCS motor subscore is 6. Skin: Skin is warm and dry. No abrasion and no rash noted. Patient is not diaphoretic. No cyanosis or erythema. Psychiatric: Patient has a normal mood and affect. Speech is normal and behavior is normal. Cognition and memory are normal.         A/P     Yaya Brink is 54 y.o. female , now with Body mass index is 34.72 kg/m². s/p Sleeve gastrectomy, has lost 8 lbs since last visit, total of 35 lbs weight loss. The patient underwent dietary counseling with registered dietician. I have reviewed, discussed and agree with the dietary plan. Patient is trying hard to keep good dietary and behavior modifications. Patient is monitoring portion sizes, food choices and liquid calories. Patient is trying to exercise regularly. Patient pleased with the surgery outcomes. We discussed how her weight affects her overall health including:  Nichole Herrera was seen today for bariatrics post op follow up. Diagnoses and all orders for this visit:    Status post laparoscopic sleeve gastrectomy       and importance of weight loss to alleviate those co morbid conditions.    I encouraged the patient to continue exercise and keeping healthy eating habits. Also counseled the patient extensively on post surgery care. RTC in 2 months  Diet and Exercise      Patient advised that its their responsibility to follow up for studies and/or labs ordered today.

## 2022-11-02 ENCOUNTER — OFFICE VISIT (OUTPATIENT)
Dept: BARIATRICS/WEIGHT MGMT | Age: 52
End: 2022-11-02
Payer: COMMERCIAL

## 2022-11-02 VITALS — WEIGHT: 217 LBS | BODY MASS INDEX: 32.89 KG/M2 | HEIGHT: 68 IN

## 2022-11-02 DIAGNOSIS — E66.9 OBESITY (BMI 30.0-34.9): Primary | ICD-10-CM

## 2022-11-02 DIAGNOSIS — Z98.84 STATUS POST LAPAROSCOPIC SLEEVE GASTRECTOMY: ICD-10-CM

## 2022-11-02 PROCEDURE — 99213 OFFICE O/P EST LOW 20 MIN: CPT | Performed by: SURGERY

## 2022-11-02 NOTE — PROGRESS NOTES
Dietary Assessment Note    Vitals:   Vitals:    11/02/22 0717   Weight: 217 lb (98.4 kg)   Height: 5' 7.5\" (1.715 m)    Patient lost 8 lbs over 2 months. Total Weight Loss: 43#    Labs reviewed: no lab studies available for review at time of visit    Protein intake: Patient not tracking     Fluid intake: 48-64 oz/day    Multivitamin/mineral intake:  Fusion - how many/day: 4    Calcium intake: None    Other: None    Exercise: Yes. How much: Walking daily (20-30 minutes). Sports just stopped so looking forward to doing that. Nutrition Assessment: 14 week post-op visit. Breakfast: coffee early, wait 30 minutes then COW     Snack: None    Lunch: vegetables and cream of chicken, chicken (2 oz) or egg (HB egg) or tuna pouch    Snack: nut mix (nuts and dried fruit)    Dinner: veggies and chicken/beef (2 oz) taco salad - beans, meat and cheese    Snack: protein shake OR nothing    30-30-30:  Following  Amount at a time: a little less than 1 cup    Food Intolerances/issues: none    Client Concerns: None    Goals:   Keep shooting for protein goal and variety - pt did not want meal plan for reference at this time  Increase activity for increased metabolic rate    Plan: F/u at 6 months post op      Miriam Gonzalez RD, LD

## 2022-11-02 NOTE — PROGRESS NOTES
[facility]) Physicians   General & Laparoscopic Surgery  [employer]       HPI:     Aashish Sanchez is a very pleasant 50 y.o. obese female , Body mass index is 33.49 kg/m². Sublimity Mellisaer And multiple medical problems who is presenting for bariatric follow up care. Aashish Sanchez is s/p laparoscopic sleeve gastrectomy by me   Comes today to the clinic without any complaints. Patient denies any nausea, vomiting, fevers, chills, shortness of breath, chest pain, constipation or urinary symptoms. Denies any heartburn nor dysphagia. Patient is feeling very well, and is very active. Patient is very pleased with the weight loss and resolution of co-morbid conditions. Past Medical History:   Diagnosis Date    Acid reflux     Anesthesia     SLOW TO WAKE UP    Brain tumor (benign) (HCC)     Chronic GERD     Kidney anomaly, congenital 2006    Kidney function abnormal     undeveloped kidney at birth (pt does not remeber which one)    Kidney stone     Liver spot     Prolonged emergence from general anesthesia     Thyroid disease      Past Surgical History:   Procedure Laterality Date    BACK SURGERY      L4-L5 herniated disc     SECTION      CHOLECYSTECTOMY  2018    HIATAL HERNIA REPAIR N/A 2022    .  performed by Berna Carnes DO at [address] [facility] ([facility])  07/15/2011    laproscopic supracervical hysterectomy    PARTIAL HYSTERECTOMY (CERVIX NOT REMOVED)      SLEEVE GASTRECTOMY N/A 2022    ROBOTIC ASSISTED LAPAROSCOPIC SLEEVE GASTRECTOMY/ HIATAL HERNIA REPAIR [facility]A [facility]    EGD BIOPSY performed by Berna Carnes DO at [facility] ENDOSCOPY     Family History   Problem Relation Age of Onset    Cancer Mother         BREAST    Kidney Disease Mother     Cancer Father         PROSTATE    Stroke Father      Social History     Tobacco Use    Smoking status: Former     Packs/day: 0.10     Years: 2.00     Pack years: 0.20     Types: Cigarettes     Quit date: 12     Years since quittin.8    Smokeless tobacco: Never   Substance Use Topics    Alcohol use: Not Currently     Comment: rare     I counseled the patient on the importance of not smoking and risks of ETOH. Allergies   Allergen Reactions    Sulfa Antibiotics     Codeine     Pork-Derived Products Hives, Diarrhea and Itching     Vomiting. Pt can eat some pork products    Codeine Nausea And Vomiting    Sulfa Antibiotics Nausea And Vomiting     Vitals:    22 0717   Weight: 217 lb (98.4 kg)   Height: 5' 7.5\" (1.715 m)       Body mass index is 33.49 kg/m². Current Outpatient Medications:     Multiple Vitamin (MULTIVITAMIN, BARIATRIC FUSION COMPLETE, CHEW TAB), Take 2 tablets by mouth in the morning., Disp: , Rfl:     omeprazole (PRILOSEC) 20 MG delayed release capsule, Take 1 capsule by mouth in the morning and 1 capsule in the evening. Take before meals.  Open capsule and mix with water for the first two weeks while meds need to be crushed., Disp: 60 capsule, Rfl: 0    Hyoscyamine Sulfate SL (LEVSIN/SL) 0.125 MG SUBL, Place 125 mcg under the tongue every 4 hours as needed (spasms), Disp: 30 each, Rfl: 0    ondansetron (ZOFRAN) 4 MG tablet, Take 1 tablet by mouth every 6 hours as needed for Nausea or Vomiting, Disp: 30 tablet, Rfl: 0    vitamin E 400 UNIT capsule, Take 400 Units by mouth daily, Disp: , Rfl:     Multiple Vitamins-Minerals (THERAPEUTIC MULTIVITAMIN-MINERALS) tablet, Take 1 tablet by mouth daily, Disp: , Rfl:     Omega-3 Fatty Acids (FISH OIL) 1000 MG CAPS, Take 1,000 mg by mouth daily, Disp: , Rfl:       Review of Systems - History obtained from the patient  General ROS: negative  Psychological ROS: negative  Hematological and Lymphatic ROS: negative  Endocrine ROS: negative  Respiratory ROS: negative  Cardiovascular ROS: negative  Gastrointestinal ROS:negative  Genito-Urinary ROS: negative  Musculoskeletal ROS: negative Skin ROS: negative    Physical Exam   Vitals Reviewed   Constitutional: Patient is oriented to person, place, and time. Patient appears well-developed and well-nourished. Patient is active and cooperative. Non-toxic appearance. No distress. Neck: Trachea normal and normal range of motion. No JVD present. Pulmonary/Chest: Effort normal. No accessory muscle usage or stridor. No apnea. No respiratory distress. Cardiovascular: Normal rate and no JVD. Abdominal: Normal appearance. Patient exhibits no distension. Abdomen is soft, obese, non tender. Musculoskeletal: Normal range of motion. Patient exhibits no edema. Neurological: Patient is alert and oriented to person, place, and time. Patient has normal strength. GCS eye subscore is 4. GCS verbal subscore is 5. GCS motor subscore is 6. Skin: Skin is warm and dry. No abrasion and no rash noted. Patient is not diaphoretic. No cyanosis or erythema. Psychiatric: Patient has a normal mood and affect. Speech is normal and behavior is normal. Cognition and memory are normal.         A/P     Gaurang Espinosa is 50 y.o. female , now with Body mass index is 33.49 kg/m². s/p Sleeve gastrectomy, has lost 8 lbs since last visit, total of 43 lbs weight loss. The patient underwent dietary counseling with registered dietician. I have reviewed, discussed and agree with the dietary plan. Patient is trying hard to keep good dietary and behavior modifications. Patient is monitoring portion sizes, food choices and liquid calories. Patient is trying to exercise regularly. Patient pleased with the surgery outcomes. We discussed how her weight affects her overall health including:  Micaela Singer was seen today for bariatrics post op follow up. Diagnoses and all orders for this visit:    Obesity (BMI 30.0-34. 9)    Status post laparoscopic sleeve gastrectomy       and importance of weight loss to alleviate those co morbid conditions.    I encouraged the patient to continue exercise and keeping healthy eating habits. Also counseled the patient extensively on post surgery care. Total encounter time:  20 minutes including any number of the following: review of labs, imaging, provider notes, outside hospital records; performing examination/evaluation; counseling patient and family; ordering medications/tests; placing referrals and communication with referring physicians; coordination of care, and documentation in the EHR. RTC in 3 months  Diet and Exercise      Patient advised that its their responsibility to follow up for studies and/or labs ordered today.

## 2023-01-04 NOTE — PROGRESS NOTES
Dietary Assessment Note    This eval was conducted via phone on 1/4/23 in preparation for their visit on 1/11/23 with Dr. Triny De La Cruz. Vitals: pt does not have a new wt to report, states things are going slow    Total Weight Loss: 43 lbs at last visit 11/22    Labs reviewed: no new labs    Protein intake: 60-80 grams/day     Fluid intake: 48-64 oz/day - decaf coffee / water / zero sugar snapple x1    Multivitamin/mineral intake: yes - 1 fusion capsule    Calcium intake: yes - 2 calcium soft chews    Other: Iron    Exercise:  as much as possible with weather, Shannon break was rough / walking & weights 2x/wk    Nutrition Assessment: 6 month post-op visit.      B- oatmeal w/ protein OR eggs  S- none  L- chicken or tuna & vegetables  S- sometimes nuts (believes ~1/4 cup)  D- meat & vegetable  S- sometimes cheese OR vegetable chips    Amount able to eat per sitting: a little less than 1 cup    Following 30/30/30 rule: yes    Food Intolerances/issues: spicy    Client Concerns: none    Goals:   - Continue plan  - Adjust activity (duration/frequency/intensity) as needed to challenge self     Plan: f/u a directed    Ale Veras RD, LD

## 2023-01-11 ENCOUNTER — OFFICE VISIT (OUTPATIENT)
Dept: BARIATRICS/WEIGHT MGMT | Age: 53
End: 2023-01-11
Payer: COMMERCIAL

## 2023-01-11 VITALS — HEIGHT: 68 IN | WEIGHT: 217 LBS | BODY MASS INDEX: 32.89 KG/M2

## 2023-01-11 DIAGNOSIS — Z98.84 STATUS POST LAPAROSCOPIC SLEEVE GASTRECTOMY: ICD-10-CM

## 2023-01-11 DIAGNOSIS — E66.9 OBESITY (BMI 30.0-34.9): Primary | ICD-10-CM

## 2023-01-11 PROCEDURE — 99213 OFFICE O/P EST LOW 20 MIN: CPT | Performed by: SURGERY

## 2023-01-12 ENCOUNTER — TELEPHONE (OUTPATIENT)
Dept: FAMILY MEDICINE CLINIC | Age: 53
End: 2023-01-12

## 2023-01-12 DIAGNOSIS — R92.8 ABNORMAL SCREENING MAMMOGRAM: Primary | ICD-10-CM

## 2023-01-12 NOTE — TELEPHONE ENCOUNTER
Patient called to say she got a message for her to schedule a diagnostic mammogram, but she is unclear why. Please advise.

## 2023-01-23 NOTE — PROGRESS NOTES
[facility]) Physicians   General & Laparoscopic Surgery  Weight Management [employer]       HPI:     Leatha Maldonado is a very pleasant 50 y.o. obese female , Body mass index is 33.49 kg/m². Emily Money And multiple medical problems who is presenting for bariatric follow up care. Leatha Maldonado is s/p laparoscopic sleeve gastrectomy by me   Comes today to the clinic without any complaints. Patient denies any nausea, vomiting, fevers, chills, shortness of breath, chest pain, constipation or urinary symptoms. Denies any heartburn nor dysphagia. Patient is feeling very well, and is very active. Patient is very pleased with the weight loss and resolution of co-morbid conditions. Past Medical History:   Diagnosis Date    Acid reflux     Anesthesia     SLOW TO WAKE UP    Brain tumor (benign) (HCC)     Chronic GERD     Kidney anomaly, congenital     Kidney function abnormal     undeveloped kidney at birth (pt does not remeber which one)    Kidney stone     Liver spot     Prolonged emergence from general anesthesia     Thyroid disease      Past Surgical History:   Procedure Laterality Date    BACK SURGERY      L4-L5 herniated disc     SECTION      CHOLECYSTECTOMY  2018    HIATAL HERNIA REPAIR N/A 2022    .  performed by Rosemary Galvez DO at [facility] ([address] [place] [address])  07/15/2011    laproscopic supracervical hysterectomy    PARTIAL HYSTERECTOMY (CERVIX NOT REMOVED)      SLEEVE GASTRECTOMY N/A 2022    ROBOTIC ASSISTED LAPAROSCOPIC SLEEVE GASTRECTOMY/ HIATAL HERNIA REPAIR performed by Rosemary Glavez DO at [address] [facility] ENDOSCOPY N/A 2022    EGD BIOPSY performed by Rosemary Galvez DO at [facility] ENDOSCOPY     Family History   Problem Relation Age of Onset    Cancer Mother         BREAST    Kidney Disease Mother     Cancer Father         PROSTATE    Stroke Father      Social History     Tobacco Use    Smoking status: Former     Packs/day: 0.10     Years: 2.00     Pack years: 0.20     Types: Cigarettes     Quit date: 12     Years since quittin.0    Smokeless tobacco: Never   Substance Use Topics    Alcohol use: Not Currently     Comment: rare     I counseled the patient on the importance of not smoking and risks of ETOH. Allergies   Allergen Reactions    Sulfa Antibiotics     Codeine     Pork-Derived Products Hives, Diarrhea and Itching     Vomiting. Pt can eat some pork products    Codeine Nausea And Vomiting    Sulfa Antibiotics Nausea And Vomiting     Vitals:    23 0716   Weight: 217 lb (98.4 kg)   Height: 5' 7.5\" (1.715 m)       Body mass index is 33.49 kg/m². Current Outpatient Medications:     Multiple Vitamin (MULTIVITAMIN, BARIATRIC FUSION COMPLETE, CHEW TAB), Take 2 tablets by mouth in the morning., Disp: , Rfl:     omeprazole (PRILOSEC) 20 MG delayed release capsule, Take 1 capsule by mouth in the morning and 1 capsule in the evening. Take before meals.  Open capsule and mix with water for the first two weeks while meds need to be crushed., Disp: 60 capsule, Rfl: 0    Hyoscyamine Sulfate SL (LEVSIN/SL) 0.125 MG SUBL, Place 125 mcg under the tongue every 4 hours as needed (spasms), Disp: 30 each, Rfl: 0    ondansetron (ZOFRAN) 4 MG tablet, Take 1 tablet by mouth every 6 hours as needed for Nausea or Vomiting, Disp: 30 tablet, Rfl: 0    vitamin E 400 UNIT capsule, Take 400 Units by mouth daily, Disp: , Rfl:     Multiple Vitamins-Minerals (THERAPEUTIC MULTIVITAMIN-MINERALS) tablet, Take 1 tablet by mouth daily, Disp: , Rfl:     Omega-3 Fatty Acids (FISH OIL) 1000 MG CAPS, Take 1,000 mg by mouth daily, Disp: , Rfl:       Review of Systems - History obtained from the patient  General ROS: negative  Psychological ROS: negative  Hematological and Lymphatic ROS: negative  Endocrine ROS: negative  Respiratory ROS: negative  Cardiovascular ROS: negative  Gastrointestinal ROS:negative  Genito-Urinary ROS: negative  Musculoskeletal ROS: negative Skin ROS: negative    Physical Exam   Vitals Reviewed   Constitutional: Patient is oriented to person, place, and time. Patient appears well-developed and well-nourished. Patient is active and cooperative. Non-toxic appearance. No distress. Neck: Trachea normal and normal range of motion. No JVD present. Pulmonary/Chest: Effort normal. No accessory muscle usage or stridor. No apnea. No respiratory distress. Cardiovascular: Normal rate and no JVD. Abdominal: Normal appearance. Patient exhibits no distension. Abdomen is soft, obese, non tender. Musculoskeletal: Normal range of motion. Patient exhibits no edema. Neurological: Patient is alert and oriented to person, place, and time. Patient has normal strength. GCS eye subscore is 4. GCS verbal subscore is 5. GCS motor subscore is 6. Skin: Skin is warm and dry. No abrasion and no rash noted. Patient is not diaphoretic. No cyanosis or erythema. Psychiatric: Patient has a normal mood and affect. Speech is normal and behavior is normal. Cognition and memory are normal.         A/P     Hebert Marie is 50 y.o. female , now with Body mass index is 33.49 kg/m². s/p Sleeve gastrectomy. The patient underwent dietary counseling with registered dietician. I have reviewed, discussed and agree with the dietary plan. Patient is trying hard to keep good dietary and behavior modifications. Patient is monitoring portion sizes, food choices and liquid calories. Patient is trying to exercise regularly. Patient pleased with the surgery outcomes. We discussed how her weight affects her overall health including:  Chetan Jordan was seen today for bariatrics post op follow up. Diagnoses and all orders for this visit:    Obesity (BMI 30.0-34. 9)    Status post laparoscopic sleeve gastrectomy       and importance of weight loss to alleviate those co morbid conditions. I encouraged the patient to continue exercise and keeping healthy eating habits.  Also counseled the patient extensively on post surgery care.     RTC in 3 months  Diet and Exercise      Patient advised that its their responsibility to follow up for studies and/or labs ordered today.

## 2023-01-25 ENCOUNTER — TELEPHONE (OUTPATIENT)
Dept: FAMILY MEDICINE CLINIC | Age: 53
End: 2023-01-25

## 2023-01-25 NOTE — TELEPHONE ENCOUNTER
I called Jm Scherer to discuss her breast ultrasound and mammogram results from yesterday. There was no answer. TERRI MOSER.

## 2023-01-26 DIAGNOSIS — R92.8 ABNORMAL MAMMOGRAM: Primary | ICD-10-CM

## 2023-01-26 NOTE — PROGRESS NOTES
I called Jayson Morgan about her abnormal mammogram and abnormal ultrasound and the fact that the radiologist recommended a biopsy of her breast.  I referred her to Dr. William Stringer breast surgeon and asked her to make an appointment. She agreed.

## 2023-05-10 ENCOUNTER — OFFICE VISIT (OUTPATIENT)
Dept: BARIATRICS/WEIGHT MGMT | Age: 53
End: 2023-05-10
Payer: COMMERCIAL

## 2023-05-10 VITALS — BODY MASS INDEX: 30.77 KG/M2 | HEIGHT: 68 IN | WEIGHT: 203 LBS

## 2023-05-10 DIAGNOSIS — I83.813 VARICOSE VEINS OF BOTH LOWER EXTREMITIES WITH PAIN: ICD-10-CM

## 2023-05-10 DIAGNOSIS — Z98.84 STATUS POST LAPAROSCOPIC SLEEVE GASTRECTOMY: ICD-10-CM

## 2023-05-10 DIAGNOSIS — E66.9 OBESITY (BMI 30.0-34.9): Primary | ICD-10-CM

## 2023-05-10 DIAGNOSIS — M79.3 PANNICULITIS: ICD-10-CM

## 2023-05-10 PROCEDURE — 99214 OFFICE O/P EST MOD 30 MIN: CPT | Performed by: SURGERY

## 2023-05-10 NOTE — PROGRESS NOTES
[facility]) Physicians   General & Laparoscopic Surgery  [employer]       HPI:     Diego Huerta is a very pleasant 50 y.o. obese female , Body mass index is 31.33 kg/m². Terrea Skates And multiple medical problems who is presenting for bariatric follow up care. Diego Huerta is s/p laparoscopic sleeve gastrectomy by me   Comes today to the clinic without any complaints. Patient denies any nausea, vomiting, fevers, chills, shortness of breath, chest pain, constipation or urinary symptoms. Denies any heartburn nor dysphagia. Patient is feeling very well, and is very active. Patient is very pleased with the weight loss and resolution of co-morbid conditions. C/O painful rash under pannus  C/O painful varicose veins moreso on right thigh        Past Medical History:   Diagnosis Date    Acid reflux     Anesthesia     SLOW TO WAKE UP    Brain tumor (benign) (HCC)     Chronic GERD     Kidney anomaly, congenital     Kidney function abnormal     undeveloped kidney at birth (pt does not remeber which one)    Kidney stone     Liver spot     Prolonged emergence from general anesthesia     Thyroid disease      Past Surgical History:   Procedure Laterality Date    BACK SURGERY      L4-L5 herniated disc     SECTION      CHOLECYSTECTOMY  2018    HIATAL HERNIA REPAIR N/A 2022    .  performed by Jasbir Goldman DO at [facility] ([address] [place] [address])  07/15/2011    laproscopic supracervical hysterectomy    PARTIAL HYSTERECTOMY (CERVIX NOT REMOVED)      SLEEVE GASTRECTOMY N/A 2022    ROBOTIC ASSISTED LAPAROSCOPIC SLEEVE GASTRECTOMY/ HIATAL HERNIA REPAIR performed by Jasbir Goldman DO at [place] [address] N/A 2022    EGD BIOPSY performed by Jasbir Goldman DO at [facility] ENDOSCOPY     Family History   Problem Relation Age of Onset    Cancer Mother         BREAST    Kidney Disease Mother     Cancer Father         PROSTATE    Stroke Father

## 2023-05-10 NOTE — PROGRESS NOTES
Dietary Assessment Note    Vitals:   Vitals:    05/10/23 0711   Weight: 203 lb (92.1 kg)   Height: 5' 7.5\" (1.715 m)    Patient lost 14 lbs over 4 months. Total Weight Loss: 57lbs      Labs reviewed: no new labs     Protein intake: 60-80 grams/day      Fluid intake: 48-64 oz/day - decaf detox tea / water      Multivitamin/mineral intake: yes - 1 fusion capsule     Calcium intake: yes - 2 calcium soft chews     Other: Iron     Exercise: coaches boys tennis, walking & weights 2x/wk     Nutrition Assessment: 9 month post-op visit.     pt has switched to unprocessed foods  B- eggs  S- handful  L- chicken or tuna & vegetables  S-handful nuts or fruit or HB egg  D- meat & vegetable  S- skips or half the time fruit      Amount able to eat per sitting: about 1 cup     Following 30/30/30 rule: yes     Food Intolerances/issues: none     Client Concerns: none     Goals:   - Continue w/ plan  - ensure consistent with protein at snack        James Parker, MS, RD, LD

## 2023-06-27 ENCOUNTER — OFFICE VISIT (OUTPATIENT)
Dept: FAMILY MEDICINE CLINIC | Age: 53
End: 2023-06-27
Payer: COMMERCIAL

## 2023-06-27 VITALS
HEIGHT: 67 IN | HEART RATE: 60 BPM | WEIGHT: 206 LBS | TEMPERATURE: 97 F | OXYGEN SATURATION: 97 % | SYSTOLIC BLOOD PRESSURE: 116 MMHG | DIASTOLIC BLOOD PRESSURE: 68 MMHG | BODY MASS INDEX: 32.33 KG/M2 | RESPIRATION RATE: 14 BRPM

## 2023-06-27 DIAGNOSIS — L30.4 PRURITIC INTERTRIGO: Primary | ICD-10-CM

## 2023-06-27 DIAGNOSIS — Z12.11 SCREENING FOR MALIGNANT NEOPLASM OF COLON: ICD-10-CM

## 2023-06-27 PROBLEM — E05.20 TOXIC NODULAR GOITER: Status: ACTIVE | Noted: 2021-07-06

## 2023-06-27 PROBLEM — M19.019 OSTEOARTHRITIS OF ACROMIOCLAVICULAR JOINT: Status: ACTIVE | Noted: 2022-06-20

## 2023-06-27 PROBLEM — N62 LARGE BREASTS: Status: ACTIVE | Noted: 2023-06-27

## 2023-06-27 PROBLEM — R92.8 ABNORMAL MAMMOGRAM: Status: ACTIVE | Noted: 2023-06-27

## 2023-06-27 PROBLEM — M25.511 RIGHT SHOULDER PAIN: Status: ACTIVE | Noted: 2022-06-20

## 2023-06-27 PROBLEM — M76.892 TENDINITIS OF LEFT QUADRICEPS TENDON: Status: ACTIVE | Noted: 2021-11-08

## 2023-06-27 PROCEDURE — 99213 OFFICE O/P EST LOW 20 MIN: CPT | Performed by: STUDENT IN AN ORGANIZED HEALTH CARE EDUCATION/TRAINING PROGRAM

## 2023-06-27 RX ORDER — CALCIUM CARBONATE 500 MG/1
1 TABLET, CHEWABLE ORAL DAILY
COMMUNITY

## 2023-06-27 RX ORDER — AMOXICILLIN AND CLAVULANATE POTASSIUM 500; 125 MG/1; MG/1
1 TABLET, FILM COATED ORAL 2 TIMES DAILY
Qty: 20 TABLET | Refills: 0 | Status: CANCELLED | OUTPATIENT
Start: 2023-06-27 | End: 2023-07-07

## 2023-06-27 RX ORDER — ZINC OXIDE 22 G/100G
1 CREAM TOPICAL DAILY
Qty: 113 G | Refills: 0 | Status: SHIPPED | OUTPATIENT
Start: 2023-06-27

## 2023-06-27 RX ORDER — KETOCONAZOLE 20 MG/G
CREAM TOPICAL
Qty: 30 G | Refills: 1 | Status: SHIPPED | OUTPATIENT
Start: 2023-06-27

## 2023-06-27 SDOH — ECONOMIC STABILITY: FOOD INSECURITY: WITHIN THE PAST 12 MONTHS, YOU WORRIED THAT YOUR FOOD WOULD RUN OUT BEFORE YOU GOT MONEY TO BUY MORE.: NEVER TRUE

## 2023-06-27 SDOH — ECONOMIC STABILITY: HOUSING INSECURITY
IN THE LAST 12 MONTHS, WAS THERE A TIME WHEN YOU DID NOT HAVE A STEADY PLACE TO SLEEP OR SLEPT IN A SHELTER (INCLUDING NOW)?: NO

## 2023-06-27 SDOH — ECONOMIC STABILITY: INCOME INSECURITY: HOW HARD IS IT FOR YOU TO PAY FOR THE VERY BASICS LIKE FOOD, HOUSING, MEDICAL CARE, AND HEATING?: NOT HARD AT ALL

## 2023-06-27 SDOH — ECONOMIC STABILITY: FOOD INSECURITY: WITHIN THE PAST 12 MONTHS, THE FOOD YOU BOUGHT JUST DIDN'T LAST AND YOU DIDN'T HAVE MONEY TO GET MORE.: NEVER TRUE

## 2023-06-27 ASSESSMENT — ENCOUNTER SYMPTOMS
EYE PAIN: 0
SORE THROAT: 0
VOMITING: 0
RHINORRHEA: 0
SHORTNESS OF BREATH: 0
COUGH: 0
DIARRHEA: 0

## 2023-06-27 ASSESSMENT — ANXIETY QUESTIONNAIRES
3. WORRYING TOO MUCH ABOUT DIFFERENT THINGS: 0
1. FEELING NERVOUS, ANXIOUS, OR ON EDGE: 0
4. TROUBLE RELAXING: 0
6. BECOMING EASILY ANNOYED OR IRRITABLE: 0
5. BEING SO RESTLESS THAT IT IS HARD TO SIT STILL: 0
GAD7 TOTAL SCORE: 0
7. FEELING AFRAID AS IF SOMETHING AWFUL MIGHT HAPPEN: 0
2. NOT BEING ABLE TO STOP OR CONTROL WORRYING: 0

## 2023-06-27 ASSESSMENT — PATIENT HEALTH QUESTIONNAIRE - PHQ9
SUM OF ALL RESPONSES TO PHQ QUESTIONS 1-9: 0
SUM OF ALL RESPONSES TO PHQ9 QUESTIONS 1 & 2: 0
SUM OF ALL RESPONSES TO PHQ QUESTIONS 1-9: 0
2. FEELING DOWN, DEPRESSED OR HOPELESS: 0
SUM OF ALL RESPONSES TO PHQ QUESTIONS 1-9: 0
SUM OF ALL RESPONSES TO PHQ QUESTIONS 1-9: 0
1. LITTLE INTEREST OR PLEASURE IN DOING THINGS: 0

## 2023-07-06 DIAGNOSIS — L30.4 PRURITIC INTERTRIGO: Primary | ICD-10-CM

## 2023-07-13 ENCOUNTER — TELEPHONE (OUTPATIENT)
Dept: VASCULAR SURGERY | Age: 53
End: 2023-07-13

## 2023-07-13 NOTE — TELEPHONE ENCOUNTER
Left several messages for patient to call the office to regarding 7/14 appointment. Appointment time needed to be changed to later on the same day. Also offered appointment sooner however have been unable to reach patient to reschedule. Left last message that appointment would be cancelled and to call back to schedule.

## 2023-07-14 ENCOUNTER — OFFICE VISIT (OUTPATIENT)
Dept: VASCULAR SURGERY | Age: 53
End: 2023-07-14
Payer: COMMERCIAL

## 2023-07-14 VITALS — HEIGHT: 67 IN | BODY MASS INDEX: 31.23 KG/M2 | WEIGHT: 199 LBS

## 2023-07-14 DIAGNOSIS — I83.893 SYMPTOMATIC VARICOSE VEINS OF BOTH LOWER EXTREMITIES: Primary | ICD-10-CM

## 2023-07-14 PROCEDURE — 99203 OFFICE O/P NEW LOW 30 MIN: CPT | Performed by: SURGERY

## 2023-07-14 ASSESSMENT — ENCOUNTER SYMPTOMS
GASTROINTESTINAL NEGATIVE: 1
EYES NEGATIVE: 1
RESPIRATORY NEGATIVE: 1

## 2023-07-14 NOTE — PROGRESS NOTES
Coordination: Coordination normal.      Gait: Gait normal.   Psychiatric:         Mood and Affect: Mood normal.         Behavior: Behavior normal.         Thought Content: Thought content normal.         Judgment: Judgment normal.       R size  L size     Spider  telangiectasias       Reticular veins     Thigh/calf 5-7 mm Varicose   veins Thigh/calf 5-7 mm       Assessment:      Symptomatic varicose veins B legs      Plan:      Begin TH 20/30 mmHg compression stockings. F/U with MD after Venous reflux scan to discuss results and treatment options. Explained rationale and pathophysiology behind approach. All questions answered. Pt agrees to proceed as suggested.

## 2023-08-07 ENCOUNTER — CLINICAL DOCUMENTATION (OUTPATIENT)
Dept: BARIATRICS/WEIGHT MGMT | Age: 53
End: 2023-08-07

## 2023-08-07 NOTE — PROGRESS NOTES
Dietary Assessment Note  This eval was conducted via phone on 8/7/23 in preparation for their visit on 8/9/23 with Dr. Neri Álvarez. Vitals: 197 lb, per pt report    Patient lost 6 lbs over past ~3 months. Total Weight Loss: 63 lbs    Labs reviewed: no lab studies available for review at time of visit    Protein intake: 60-80 grams/day     Fluid intake: 48-64 oz/day- decaf detox tea / water    Multivitamin/mineral intake: yes - 1 fusion capsule    Calcium intake: yes - 2 calcium soft chews    Other: Iron    Exercise: yes - walking daily for 20-30 min    Nutrition Assessment: 1 year post-op visit.      B- cream of wheat OR egg & 1/2 cup berries  S- sometimes peanuts OR cheese  L- chicken & vegetables  S- peanuts  D- chicken OR beef & vegetables  S- none    Amount able to eat per sitting: ~1-1.5 cups, mostly 1 cup (total volume)    Following 30/30/30 rule: yes    Food Intolerances/issues: none    Client Concerns: has appointment w/ Dr. Bebeto Quiroz Wednesday    Goals:   - Continue plan    Plan: f/u as directed    Shane Magdaleno RD, LD

## 2023-08-09 ENCOUNTER — OFFICE VISIT (OUTPATIENT)
Dept: BARIATRICS/WEIGHT MGMT | Age: 53
End: 2023-08-09
Payer: COMMERCIAL

## 2023-08-09 ENCOUNTER — INITIAL CONSULT (OUTPATIENT)
Dept: SURGERY | Age: 53
End: 2023-08-09
Payer: COMMERCIAL

## 2023-08-09 VITALS
HEIGHT: 68 IN | DIASTOLIC BLOOD PRESSURE: 77 MMHG | SYSTOLIC BLOOD PRESSURE: 118 MMHG | RESPIRATION RATE: 16 BRPM | OXYGEN SATURATION: 98 % | BODY MASS INDEX: 30.01 KG/M2 | WEIGHT: 198 LBS

## 2023-08-09 VITALS
HEART RATE: 50 BPM | WEIGHT: 198 LBS | BODY MASS INDEX: 30.01 KG/M2 | DIASTOLIC BLOOD PRESSURE: 77 MMHG | SYSTOLIC BLOOD PRESSURE: 118 MMHG | HEIGHT: 68 IN

## 2023-08-09 DIAGNOSIS — E66.9 OBESITY (BMI 30.0-34.9): ICD-10-CM

## 2023-08-09 DIAGNOSIS — M79.3 PANNICULITIS: ICD-10-CM

## 2023-08-09 DIAGNOSIS — E88.81 METABOLIC SYNDROME: Primary | ICD-10-CM

## 2023-08-09 DIAGNOSIS — M79.3 PANNICULITIS: Primary | ICD-10-CM

## 2023-08-09 PROCEDURE — 99214 OFFICE O/P EST MOD 30 MIN: CPT | Performed by: SURGERY

## 2023-08-09 PROCEDURE — 99204 OFFICE O/P NEW MOD 45 MIN: CPT | Performed by: SURGERY

## 2023-08-10 ENCOUNTER — TELEPHONE (OUTPATIENT)
Dept: SURGERY | Age: 53
End: 2023-08-10

## 2023-08-10 NOTE — TELEPHONE ENCOUNTER
The patient was in the office to see Dr. Dannielle Lombard yesterday. PLAN: Would benefit from panniculectomy for functional improvement. Additionally, would benefit from concomitant abdominoplasty for improved contour. Will submit to insurance and provide cosmetic pricing and then schedule. I received a surgery letter. I lmom for the patient at the home number listed. I requested a call back to discuss needed medical records. I will leave this phone note open.

## 2023-09-07 NOTE — TELEPHONE ENCOUNTER
I spoke with the patient at the home number listed to discuss needed medical records. She stated that she has seen or talked to her PCP about excess skin, rashes and itching. The patient is aware that I will view and print any medical records that will be helpful. The patient has been provided an insurance update. I spoke with Claudetta Lawrence at [facility] ([phone]) to see if CPT Code 41982 requires pre certification. The code does require pre certification, which I started during our call. I will fax the clinicals to [phone]. I will scan the information that I faxed and the fax success into Epic under media, but I am not able to scan colored pictures. Date Range 9-7-2023 to 12-    Pending Reference [facility] [id number]    [facility]    I will leave this phone note open.

## 2023-09-20 DIAGNOSIS — I83.893 SYMPTOMATIC VARICOSE VEINS OF BOTH LOWER EXTREMITIES: Primary | ICD-10-CM

## 2023-09-21 ENCOUNTER — OFFICE VISIT (OUTPATIENT)
Dept: VASCULAR SURGERY | Age: 53
End: 2023-09-21
Payer: COMMERCIAL

## 2023-09-21 ENCOUNTER — PROCEDURE VISIT (OUTPATIENT)
Dept: VASCULAR SURGERY | Age: 53
End: 2023-09-21
Payer: COMMERCIAL

## 2023-09-21 VITALS — BODY MASS INDEX: 30.01 KG/M2 | HEART RATE: 51 BPM | HEIGHT: 68 IN | WEIGHT: 198 LBS

## 2023-09-21 DIAGNOSIS — I83.893 SYMPTOMATIC VARICOSE VEINS OF BOTH LOWER EXTREMITIES: ICD-10-CM

## 2023-09-21 DIAGNOSIS — I83.893 SYMPTOMATIC VARICOSE VEINS OF BOTH LOWER EXTREMITIES: Primary | ICD-10-CM

## 2023-09-21 PROCEDURE — 93970 EXTREMITY STUDY: CPT | Performed by: SURGERY

## 2023-09-21 PROCEDURE — 99213 OFFICE O/P EST LOW 20 MIN: CPT | Performed by: SURGERY

## 2023-09-21 NOTE — PROGRESS NOTES
Seen back for symptomatic varicose veins B legs. Pt has regularly been wearing the prescribed TH stockings with some benefit as slight decrease in symptoms. No reported edema, bleeding, tender cord, skin changes or ulceration. Recent venous reflux scan performed on 9/21/2023 at [facility]. EXAM:  No edema, ulceration or dermatitis. All VVs soft, nontender without erythema. VRS - R SFJ/AASV reflux + R BK GSV reflux; No B deep reflux or L leg reflux noted    A/P: Chronic superficial venous reflux R leg with symptomatic varicose veins B legs   SIGNIFICANT R AXIAL REFLUX with LARGE VARICOSITIES. Surgery is recommended - R AASV RFA + R BK GSV RFA + B stab phlebectomies. This was discussed in terms that the patient could understand. The risks, including bleeding, clotting, bruising, swelling, neuritis, skin dimpling, infection, pigmentation, scarring, and mortality were discussed. I answered all questions pertaining to surgery and post operative expectations related to return to work and daily activity. Time spent counseling and coordination of care: 25 minutes. More than 50% of visit was spent reviewing and discussing venous duplex scan. She will continue compression stockings and schedule as recommended if desired.

## 2023-09-26 NOTE — TELEPHONE ENCOUNTER
[facility]  [address]  [facility] [address]  [phone]  Dept: [phone]    PRE-OP EVALUATION:  Today's date: 2017    Lilli Steven (: 1948) presents for pre-operative evaluation assessment as requested by Dr. Sargent.  She requires evaluation and anesthesia risk assessment prior to undergoing surgery/procedure for treatment of umbilical hernia .  Proposed procedure: hernia repair     Date of Surgery/ Procedure: 2017  Time of Surgery/ Procedure: D  Hospital/Surgical Facility: [facility]   Fax number for surgical facility:   Primary Physician: Vargas Chaudhry  Type of Anesthesia Anticipated: to be determined    Patient has a Health Care Directive or Living Will:  NO    Preop Questions 2017   1.  Do you have a history of heart attack, stroke, stent, bypass or surgery on an artery in the head, neck, heart or legs? No   2.  Do you ever have any pain or discomfort in your chest? No   3.  Do you have a history of  Heart Failure? No   4.   Are you troubled by shortness of breath when:  walking on a level surface, or up a slight hill, or at night? No   5.  Do you currently have a cold, bronchitis or other respiratory infection? No   6.  Do you have a cough, shortness of breath, or wheezing? No   7.  Do you sometimes get pains in the calves of your legs when you walk? No   8. Do you or anyone in your family have previous history of blood clots? No   9.  Do you or does anyone in your family have a serious bleeding problem such as prolonged bleeding following surgeries or cuts? No   10. Have you ever had problems with anemia or been told to take iron pills? No   11. Have you had any abnormal blood loss such as black, tarry or bloody stools, or abnormal vaginal bleeding? No   12. Have you ever had a blood transfusion? YES - when she was born, RH factor   13. Have you or any of your relatives ever had problems with anesthesia? YES - Self,  I spoke with Oscar Gimenez at [facility] ([phone]) to follow up on Pending Reference # [id number]. She stated that there was a message that they did not receive enough clinical information, but no one called to update us. I was transferred to the nurse line and spoke with Mauricio Maradiaga to discuss the case and the received clinicals. She stated that the case has not been canceled and they do have clinicals. She stated that they are running behind. Call Reference # nurses do not have call reference numbers       MISCABD     I will leave this phone note open. fainting, vomiting   14. Do you have sleep apnea, excessive snoring or daytime drowsiness? No   15. Do you have any prosthetic heart valves? No   16. Do you have prosthetic joints? No   17. Is there any chance that you may be pregnant? No       HPI:                                                      Brief HPI related to upcoming procedure: worsening umbilical hernia, over the last 6 months      See problem list for active medical problems.  Problems all longstanding and stable, except as noted/documented.  See ROS for pertinent symptoms related to these conditions.                                                                                                  .    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     Priority: Medium     Insomnia 09/06/2012     Priority: Medium     trazodone       Snores 09/06/2012     Priority: Medium     Wears glasses 09/06/2012     Priority: Medium     Diarrhea 09/06/2012     Priority: Medium     Neck pain 09/06/2012     Priority: Medium     Seasonal allergies 09/06/2012     Priority: Medium     Spring months       Osteopenia 05/17/2011     Priority: Medium     DEXA 2011  T -1 Lumbar spine  Right hip T -1.8  Left hip T -1.9       Spasmodic torticollis 07/19/1988     Priority: Medium     Botox injection        Past Medical History   Diagnosis Date     Hypercholesterolemia 9/6/2012     Snores 9/6/2012     Wears glasses 9/6/2012     Diarrhea 9/6/2012     Neck pain 9/6/2012     Seasonal allergies 9/6/2012     Past Surgical History   Procedure Laterality Date     Tonsillectomy        Implant stimulator sacral nerve stage one       for bladder incontinence, failed     Current Outpatient Prescriptions   Medication Sig Dispense Refill     OnabotulinumtoxinA (BOTOX IJ) Inject 125 Units into the muscle Lot # /C3  Exp: 8/2019       traZODone (DESYREL) 50 MG tablet TAKE THREE TABLETS BY MOUTH NIGHTLY AT BEDTIME 270 tablet 0     OnabotulinumtoxinA (BOTOX IJ) Inject 125 Units into the muscle Lot # /C3   Exp: 6/2019       OnabotulinumtoxinA (BOTOX IJ) Inject 125 Units into the muscle once Lot#/C3, Exp 04/2019       OnabotulinumtoxinA (BOTOX IJ) Inject 125 Units as directed Lot #  C3  Exp 12/2018       OnabotulinumtoxinA (BOTOX IJ) Inject 135 Units into the muscle Lot # /C3  Exp: 09/2018       OnabotulinumtoxinA (BOTOX IJ) Inject 135 Units into the muscle Lot# /C3, Exp 08/2018       simvastatin (ZOCOR) 20 MG tablet Take 1 tablet (20 mg) by mouth At Bedtime 90 tablet 3     OnabotulinumtoxinA (BOTOX IJ) Inject 135 Units into the muscle Lot# /C3, Exp 03/2018       calcium-vitamin D (CALTRATE) 600-400 MG-UNIT per tablet Take 1 tablet by mouth 2 times daily       OTC products: None, except as noted above    Allergies   Allergen Reactions     Allergy      Dermabond  Anesthesia IV set misc       Meloxicam      Diarrhea, vomiting     Primidone Other (See Comments)     Felt like motion sickness     Tramadol      Diarrhea, vomiting      Adhesive Tape Rash     Durabond only      Latex Allergy: NO    Social History   Substance Use Topics     Smoking status: Former Smoker     Types: Cigarettes     Smokeless tobacco: Never Used     Alcohol Use: 0.0 oz/week     0 Standard drinks or equivalent per week      Comment: glass of wine daily     History   Drug Use No       REVIEW OF SYSTEMS:                                                    C: NEGATIVE for fever, chills, change in weight  I: NEGATIVE for worrisome rashes, moles or lesions  E: NEGATIVE for vision changes or irritation  E/M:  "NEGATIVE for ear, mouth and throat problems  R: NEGATIVE for significant cough or SOB  B: NEGATIVE for masses, tenderness or discharge  CV: NEGATIVE for chest pain, palpitations or peripheral edema  GI: NEGATIVE for nausea, abdominal pain, heartburn, or change in bowel habits  : NEGATIVE for frequency, dysuria, or hematuria  M: NEGATIVE for significant arthralgias or myalgia  N: NEGATIVE for weakness, dizziness or paresthesias  E: NEGATIVE for temperature intolerance, skin/hair changes  H: NEGATIVE for bleeding problems  P: NEGATIVE for changes in mood or affect    EXAM:                                                    Objective:  /78 mmHg  Pulse 78  Temp(Src) 97.9  F (36.6  C) (Oral)  Resp 16  Ht 5' 3\" (1.6 m)  Wt 166 lb (75.297 kg)  BMI 29.41 kg/m2  SpO2 96%  General Appearance: Pleasant, alert, in no acute respiratory distress.  Head Exam: Normal. Normocephalic, atraumatic. No sinus tenderness.  Eye Exam: Normal external eye, conjunctiva, lids. ELENA.  Ear Exam: Normal auditory canals and external ears. Non-tender.  Left TM-normal. Right TM-normal.  OroPharynx Exam: Dental hygiene adequate. Normal buccal mucosa. Normal pharynx.  Neck Exam: Supple, no masses or enlarged, tender nodes.  Thyroid Exam: No nodules or enlargement or tenderness.  Chest/Respiratory Exam: Normal, comfortable, easy respirations. Chest wall normal. Lungs are clear to auscultation. No wheezing, crackles, or rhonchi.  Cardiovascular Exam: Regular rate and rhythm. No murmur, gallop, or rubs. No pedal edema.  Gastrointestinal Exam: Soft, non-tender, no masses or organomegaly.  Umbilical henia  Musculoskeletal Exam: Back is non-tender, full ROM of upper and lower extremities.  Skin: no rash, warm and dry.  Neurologic Exam: Nonfocal, no tremor. Normal gait.  Psychiatric Exam: Alert - appropriate, normal affect      DIAGNOSTICS:                                                    EKG: appears normal, NSR, normal axis, normal " intervals, no acute ST/T changes c/w ischemia, no LVH by voltage criteria, unchanged from previous tracings    No results for input(s): HGB, PLT, INR, NA, POTASSIUM, CR, A1C in the last 59130 hours.     IMPRESSION:                                                    Reason for surgery/procedure: Hernia  Diagnosis/reason for consult: routine    The proposed surgical procedure is considered INTERMEDIATE risk.    REVISED CARDIAC RISK INDEX  The patient has the following serious cardiovascular risks for perioperative complications such as (MI, PE, VFib and 3  AV Block):  No serious cardiac risks  INTERPRETATION: 0 risks: Class I (very low risk - 0.4% complication rate)    The patient has the following additional risks for perioperative complications:  No identified additional risks      ICD-10-CM    1. Preop general physical exam Z01.818 EKG 12-lead complete w/read - Clinics   2. Umbilical hernia without obstruction and without gangrene K42.9    3. Spasmodic torticollis G24.3 PHYSIATRY REFERRAL   4. Cervical dystonia G24.3 PHYSIATRY REFERRAL       RECOMMENDATIONS:                                                        Cardiovascular Risk  Performs 4 METs exercise without symptoms (Walk on level ground at 15 minutes per mile (4 miles/hour)) .       APPROVAL GIVEN to proceed with proposed procedure, without further diagnostic evaluation       Signed Electronically by: Vargas Chaudhry MD    Copy of this evaluation report is provided to requesting physician.    [facility] Preop Guidelines

## 2023-09-26 NOTE — TELEPHONE ENCOUNTER
I received a fax from [address] [place] [address] dated 9-. I will scan the fax into Epic under the media tab. DENIED  CPT Code 65652  Reference Number [id number]  Date Range 9-7-2023 to 9-5-2024    Please see DENIAL reason on the front page. I lmom for the patient at the home number listed. I requested a call back to discuss the insurance DENIAL that I received. I will leave this phone note open.

## 2023-10-03 NOTE — TELEPHONE ENCOUNTER
I returned the patients call mentioned below. She is aware of the DENIAL and the reasoning listed on page 1. She was provided cosmetic pricing and is also aware that if she changes insurance we are happy to resubmit for her. I asked that the patient call us if we can be helpful. I will remove the surgery letter from the letter queue. I will close this phone note.

## 2023-10-04 ENCOUNTER — HOSPITAL ENCOUNTER (OUTPATIENT)
Dept: ULTRASOUND IMAGING | Age: 53
Discharge: HOME | End: 2023-10-04
Payer: COMMERCIAL

## 2023-10-04 ENCOUNTER — HOSPITAL ENCOUNTER (OUTPATIENT)
Dept: MAMMOGRAPHY | Age: 53
Discharge: HOME | End: 2023-10-04
Payer: COMMERCIAL

## 2023-10-04 DIAGNOSIS — R92.8 ABNORMAL MAMMOGRAM: ICD-10-CM

## 2023-10-04 DIAGNOSIS — Z09 FOLLOW-UP EXAM, 3-6 MONTHS SINCE PREVIOUS EXAM: ICD-10-CM

## 2023-10-04 PROCEDURE — 76642 ULTRASOUND BREAST LIMITED: CPT

## 2023-10-04 PROCEDURE — G0279 TOMOSYNTHESIS, MAMMO: HCPCS

## 2024-01-16 ENCOUNTER — TELEPHONE (OUTPATIENT)
Dept: VASCULAR SURGERY | Age: 54
End: 2024-01-16

## 2024-01-25 ENCOUNTER — TELEPHONE (OUTPATIENT)
Dept: VASCULAR SURGERY | Age: 54
End: 2024-01-25

## 2024-03-21 ENCOUNTER — HOSPITAL ENCOUNTER (OUTPATIENT)
Dept: CT IMAGING | Age: 54
Discharge: HOME | End: 2024-03-21
Payer: COMMERCIAL

## 2024-03-21 DIAGNOSIS — N20.0 CALCULUS OF KIDNEY: ICD-10-CM

## 2024-03-21 DIAGNOSIS — R39.14 FEELING OF INCOMPLETE BLADDER EMPTYING: ICD-10-CM

## 2024-03-21 DIAGNOSIS — E66.09 OTHER OBESITY DUE TO EXCESS CALORIES: ICD-10-CM

## 2024-03-21 DIAGNOSIS — N30.00 ACUTE CYSTITIS WITHOUT HEMATURIA: ICD-10-CM

## 2024-03-21 DIAGNOSIS — R39.198 OTHER DIFFICULTIES WITH MICTURITION: ICD-10-CM

## 2024-03-21 PROCEDURE — 99285 EMERGENCY DEPT VISIT HI MDM: CPT

## 2024-03-21 PROCEDURE — 74176 CT ABD & PELVIS W/O CONTRAST: CPT

## 2024-03-21 PROCEDURE — 96374 THER/PROPH/DIAG INJ IV PUSH: CPT

## 2024-03-22 ENCOUNTER — HOSPITAL ENCOUNTER (INPATIENT)
Age: 54
LOS: 1 days | Discharge: HOME | DRG: 661 | End: 2024-03-23
Attending: EMERGENCY MEDICINE | Admitting: STUDENT IN AN ORGANIZED HEALTH CARE EDUCATION/TRAINING PROGRAM
Payer: COMMERCIAL

## 2024-03-22 ENCOUNTER — ANESTHESIA EVENT (OUTPATIENT)
Dept: OPERATING ROOM | Age: 54
End: 2024-03-22
Payer: COMMERCIAL

## 2024-03-22 ENCOUNTER — ANESTHESIA (OUTPATIENT)
Dept: OPERATING ROOM | Age: 54
End: 2024-03-22
Payer: COMMERCIAL

## 2024-03-22 ENCOUNTER — APPOINTMENT (OUTPATIENT)
Dept: GENERAL RADIOLOGY | Age: 54
DRG: 661 | End: 2024-03-22
Payer: COMMERCIAL

## 2024-03-22 DIAGNOSIS — Z90.5 SINGLE FUNCTIONAL KIDNEY: Primary | ICD-10-CM

## 2024-03-22 DIAGNOSIS — N20.0 KIDNEY STONE ON LEFT SIDE: ICD-10-CM

## 2024-03-22 DIAGNOSIS — N20.1 URETEROLITHIASIS: ICD-10-CM

## 2024-03-22 LAB
ABO + RH BLD: NORMAL
ANION GAP SERPL CALCULATED.3IONS-SCNC: 12 MMOL/L (ref 3–16)
BACTERIA URNS QL MICRO: ABNORMAL /HPF
BASOPHILS # BLD: 0 K/UL (ref 0–0.2)
BASOPHILS NFR BLD: 0.7 %
BILIRUB UR QL STRIP.AUTO: NEGATIVE
BLD GP AB SCN SERPL QL: NORMAL
BUN SERPL-MCNC: 10 MG/DL (ref 7–20)
CALCIUM SERPL-MCNC: 9.4 MG/DL (ref 8.3–10.6)
CHLORIDE SERPL-SCNC: 100 MMOL/L (ref 99–110)
CLARITY UR: CLEAR
CO2 SERPL-SCNC: 27 MMOL/L (ref 21–32)
COLOR UR: YELLOW
CREAT SERPL-MCNC: 0.8 MG/DL (ref 0.6–1.1)
DEPRECATED RDW RBC AUTO: 13.4 % (ref 12.4–15.4)
EOSINOPHIL # BLD: 0.1 K/UL (ref 0–0.6)
EOSINOPHIL NFR BLD: 1.7 %
GFR SERPLBLD CREATININE-BSD FMLA CKD-EPI: >60 ML/MIN/{1.73_M2}
GLUCOSE SERPL-MCNC: 105 MG/DL (ref 70–99)
GLUCOSE UR STRIP.AUTO-MCNC: NEGATIVE MG/DL
HCT VFR BLD AUTO: 39.4 % (ref 36–48)
HGB BLD-MCNC: 13.4 G/DL (ref 12–16)
HGB UR QL STRIP.AUTO: ABNORMAL
INR PPP: 1.04 (ref 0.84–1.16)
KETONES UR STRIP.AUTO-MCNC: NEGATIVE MG/DL
LEUKOCYTE ESTERASE UR QL STRIP.AUTO: ABNORMAL
LYMPHOCYTES # BLD: 1.3 K/UL (ref 1–5.1)
LYMPHOCYTES NFR BLD: 20.2 %
MCH RBC QN AUTO: 29.9 PG (ref 26–34)
MCHC RBC AUTO-ENTMCNC: 34 G/DL (ref 31–36)
MCV RBC AUTO: 87.9 FL (ref 80–100)
MONOCYTES # BLD: 0.8 K/UL (ref 0–1.3)
MONOCYTES NFR BLD: 12.3 %
NEUTROPHILS # BLD: 4.2 K/UL (ref 1.7–7.7)
NEUTROPHILS NFR BLD: 65.1 %
NITRITE UR QL STRIP.AUTO: NEGATIVE
PH UR STRIP.AUTO: 6 [PH] (ref 5–8)
PLATELET # BLD AUTO: 211 K/UL (ref 135–450)
PMV BLD AUTO: 8.2 FL (ref 5–10.5)
POTASSIUM SERPL-SCNC: 3.9 MMOL/L (ref 3.5–5.1)
PROT UR STRIP.AUTO-MCNC: NEGATIVE MG/DL
PROTHROMBIN TIME: 13.6 SEC (ref 11.5–14.8)
RBC # BLD AUTO: 4.48 M/UL (ref 4–5.2)
RBC #/AREA URNS HPF: ABNORMAL /HPF (ref 0–4)
SODIUM SERPL-SCNC: 139 MMOL/L (ref 136–145)
SP GR UR STRIP.AUTO: 1.01 (ref 1–1.03)
UA COMPLETE W REFLEX CULTURE PNL UR: ABNORMAL
UA DIPSTICK W REFLEX MICRO PNL UR: YES
URN SPEC COLLECT METH UR: ABNORMAL
UROBILINOGEN UR STRIP-ACNC: 0.2 E.U./DL
WBC # BLD AUTO: 6.5 K/UL (ref 4–11)
WBC #/AREA URNS HPF: ABNORMAL /HPF (ref 0–5)

## 2024-03-22 PROCEDURE — 2580000003 HC RX 258: Performed by: UROLOGY

## 2024-03-22 PROCEDURE — 6360000002 HC RX W HCPCS

## 2024-03-22 PROCEDURE — 3700000001 HC ADD 15 MINUTES (ANESTHESIA): Performed by: UROLOGY

## 2024-03-22 PROCEDURE — 88300 SURGICAL PATH GROSS: CPT

## 2024-03-22 PROCEDURE — A4217 STERILE WATER/SALINE, 500 ML: HCPCS | Performed by: UROLOGY

## 2024-03-22 PROCEDURE — 6370000000 HC RX 637 (ALT 250 FOR IP): Performed by: EMERGENCY MEDICINE

## 2024-03-22 PROCEDURE — 2500000003 HC RX 250 WO HCPCS

## 2024-03-22 PROCEDURE — 6360000002 HC RX W HCPCS: Performed by: INTERNAL MEDICINE

## 2024-03-22 PROCEDURE — 86850 RBC ANTIBODY SCREEN: CPT

## 2024-03-22 PROCEDURE — 7100000001 HC PACU RECOVERY - ADDTL 15 MIN: Performed by: UROLOGY

## 2024-03-22 PROCEDURE — 2580000003 HC RX 258: Performed by: INTERNAL MEDICINE

## 2024-03-22 PROCEDURE — C1758 CATHETER, URETERAL: HCPCS | Performed by: UROLOGY

## 2024-03-22 PROCEDURE — 0TC78ZZ EXTIRPATION OF MATTER FROM LEFT URETER, VIA NATURAL OR ARTIFICIAL OPENING ENDOSCOPIC: ICD-10-PCS | Performed by: UROLOGY

## 2024-03-22 PROCEDURE — 81001 URINALYSIS AUTO W/SCOPE: CPT

## 2024-03-22 PROCEDURE — 2580000003 HC RX 258: Performed by: EMERGENCY MEDICINE

## 2024-03-22 PROCEDURE — C1769 GUIDE WIRE: HCPCS | Performed by: UROLOGY

## 2024-03-22 PROCEDURE — 6370000000 HC RX 637 (ALT 250 FOR IP): Performed by: INTERNAL MEDICINE

## 2024-03-22 PROCEDURE — 85025 COMPLETE CBC W/AUTO DIFF WBC: CPT

## 2024-03-22 PROCEDURE — 2720000010 HC SURG SUPPLY STERILE: Performed by: UROLOGY

## 2024-03-22 PROCEDURE — C2617 STENT, NON-COR, TEM W/O DEL: HCPCS | Performed by: UROLOGY

## 2024-03-22 PROCEDURE — 6360000002 HC RX W HCPCS: Performed by: EMERGENCY MEDICINE

## 2024-03-22 PROCEDURE — 85610 PROTHROMBIN TIME: CPT

## 2024-03-22 PROCEDURE — 0T778DZ DILATION OF LEFT URETER WITH INTRALUMINAL DEVICE, VIA NATURAL OR ARTIFICIAL OPENING ENDOSCOPIC: ICD-10-PCS | Performed by: UROLOGY

## 2024-03-22 PROCEDURE — 3600000004 HC SURGERY LEVEL 4 BASE: Performed by: UROLOGY

## 2024-03-22 PROCEDURE — 87040 BLOOD CULTURE FOR BACTERIA: CPT

## 2024-03-22 PROCEDURE — 3700000000 HC ANESTHESIA ATTENDED CARE: Performed by: UROLOGY

## 2024-03-22 PROCEDURE — 36415 COLL VENOUS BLD VENIPUNCTURE: CPT

## 2024-03-22 PROCEDURE — 82365 CALCULUS SPECTROSCOPY: CPT

## 2024-03-22 PROCEDURE — 80048 BASIC METABOLIC PNL TOTAL CA: CPT

## 2024-03-22 PROCEDURE — 1200000000 HC SEMI PRIVATE

## 2024-03-22 PROCEDURE — 2709999900 HC NON-CHARGEABLE SUPPLY: Performed by: UROLOGY

## 2024-03-22 PROCEDURE — 86900 BLOOD TYPING SEROLOGIC ABO: CPT

## 2024-03-22 PROCEDURE — 7100000000 HC PACU RECOVERY - FIRST 15 MIN: Performed by: UROLOGY

## 2024-03-22 PROCEDURE — 86901 BLOOD TYPING SEROLOGIC RH(D): CPT

## 2024-03-22 PROCEDURE — 3600000014 HC SURGERY LEVEL 4 ADDTL 15MIN: Performed by: UROLOGY

## 2024-03-22 DEVICE — URETERAL STENT
Type: IMPLANTABLE DEVICE | Site: URETER | Status: FUNCTIONAL
Brand: POLARIS™ ULTRA

## 2024-03-22 RX ORDER — SODIUM CHLORIDE 9 MG/ML
INJECTION, SOLUTION INTRAVENOUS PRN
Status: DISCONTINUED | OUTPATIENT
Start: 2024-03-22 | End: 2024-03-23 | Stop reason: HOSPADM

## 2024-03-22 RX ORDER — POTASSIUM CHLORIDE 20 MEQ/1
40 TABLET, EXTENDED RELEASE ORAL PRN
Status: DISCONTINUED | OUTPATIENT
Start: 2024-03-22 | End: 2024-03-23 | Stop reason: HOSPADM

## 2024-03-22 RX ORDER — ROCURONIUM BROMIDE 10 MG/ML
INJECTION, SOLUTION INTRAVENOUS PRN
Status: DISCONTINUED | OUTPATIENT
Start: 2024-03-22 | End: 2024-03-22 | Stop reason: SDUPTHER

## 2024-03-22 RX ORDER — ACETAMINOPHEN 160 MG
2000 TABLET,DISINTEGRATING ORAL DAILY
COMMUNITY

## 2024-03-22 RX ORDER — POTASSIUM CHLORIDE 7.45 MG/ML
10 INJECTION INTRAVENOUS PRN
Status: DISCONTINUED | OUTPATIENT
Start: 2024-03-22 | End: 2024-03-23 | Stop reason: HOSPADM

## 2024-03-22 RX ORDER — MAGNESIUM HYDROXIDE/ALUMINUM HYDROXICE/SIMETHICONE 120; 1200; 1200 MG/30ML; MG/30ML; MG/30ML
30 SUSPENSION ORAL EVERY 6 HOURS PRN
Status: DISCONTINUED | OUTPATIENT
Start: 2024-03-22 | End: 2024-03-23 | Stop reason: HOSPADM

## 2024-03-22 RX ORDER — SUCCINYLCHOLINE/SOD CL,ISO/PF 200MG/10ML
SYRINGE (ML) INTRAVENOUS PRN
Status: DISCONTINUED | OUTPATIENT
Start: 2024-03-22 | End: 2024-03-22 | Stop reason: SDUPTHER

## 2024-03-22 RX ORDER — SODIUM CHLORIDE 9 MG/ML
INJECTION, SOLUTION INTRAVENOUS PRN
Status: DISCONTINUED | OUTPATIENT
Start: 2024-03-22 | End: 2024-03-22 | Stop reason: HOSPADM

## 2024-03-22 RX ORDER — DEXAMETHASONE SODIUM PHOSPHATE 4 MG/ML
INJECTION, SOLUTION INTRA-ARTICULAR; INTRALESIONAL; INTRAMUSCULAR; INTRAVENOUS; SOFT TISSUE PRN
Status: DISCONTINUED | OUTPATIENT
Start: 2024-03-22 | End: 2024-03-22 | Stop reason: SDUPTHER

## 2024-03-22 RX ORDER — ONDANSETRON 2 MG/ML
INJECTION INTRAMUSCULAR; INTRAVENOUS PRN
Status: DISCONTINUED | OUTPATIENT
Start: 2024-03-22 | End: 2024-03-22 | Stop reason: SDUPTHER

## 2024-03-22 RX ORDER — NALOXONE HYDROCHLORIDE 0.4 MG/ML
INJECTION, SOLUTION INTRAMUSCULAR; INTRAVENOUS; SUBCUTANEOUS PRN
Status: DISCONTINUED | OUTPATIENT
Start: 2024-03-22 | End: 2024-03-22 | Stop reason: HOSPADM

## 2024-03-22 RX ORDER — HYDROMORPHONE HYDROCHLORIDE 1 MG/ML
0.5 INJECTION, SOLUTION INTRAMUSCULAR; INTRAVENOUS; SUBCUTANEOUS EVERY 30 MIN PRN
Status: DISCONTINUED | OUTPATIENT
Start: 2024-03-22 | End: 2024-03-23 | Stop reason: HOSPADM

## 2024-03-22 RX ORDER — ONDANSETRON 4 MG/1
4 TABLET, ORALLY DISINTEGRATING ORAL EVERY 8 HOURS PRN
Status: DISCONTINUED | OUTPATIENT
Start: 2024-03-22 | End: 2024-03-23 | Stop reason: HOSPADM

## 2024-03-22 RX ORDER — ACETAMINOPHEN 325 MG/1
650 TABLET ORAL EVERY 6 HOURS PRN
Status: DISCONTINUED | OUTPATIENT
Start: 2024-03-22 | End: 2024-03-23 | Stop reason: HOSPADM

## 2024-03-22 RX ORDER — ONDANSETRON 2 MG/ML
4 INJECTION INTRAMUSCULAR; INTRAVENOUS EVERY 6 HOURS PRN
Status: DISCONTINUED | OUTPATIENT
Start: 2024-03-22 | End: 2024-03-23 | Stop reason: HOSPADM

## 2024-03-22 RX ORDER — SODIUM CHLORIDE 0.9 % (FLUSH) 0.9 %
5-40 SYRINGE (ML) INJECTION PRN
Status: DISCONTINUED | OUTPATIENT
Start: 2024-03-22 | End: 2024-03-22 | Stop reason: HOSPADM

## 2024-03-22 RX ORDER — SODIUM CHLORIDE 0.9 % (FLUSH) 0.9 %
5-40 SYRINGE (ML) INJECTION EVERY 12 HOURS SCHEDULED
Status: DISCONTINUED | OUTPATIENT
Start: 2024-03-22 | End: 2024-03-23 | Stop reason: HOSPADM

## 2024-03-22 RX ORDER — MAGNESIUM SULFATE IN WATER 40 MG/ML
2000 INJECTION, SOLUTION INTRAVENOUS PRN
Status: DISCONTINUED | OUTPATIENT
Start: 2024-03-22 | End: 2024-03-23 | Stop reason: HOSPADM

## 2024-03-22 RX ORDER — SODIUM CHLORIDE, SODIUM LACTATE, POTASSIUM CHLORIDE, CALCIUM CHLORIDE 600; 310; 30; 20 MG/100ML; MG/100ML; MG/100ML; MG/100ML
INJECTION, SOLUTION INTRAVENOUS CONTINUOUS
Status: ACTIVE | OUTPATIENT
Start: 2024-03-22 | End: 2024-03-23

## 2024-03-22 RX ORDER — SODIUM CHLORIDE, SODIUM LACTATE, POTASSIUM CHLORIDE, CALCIUM CHLORIDE 600; 310; 30; 20 MG/100ML; MG/100ML; MG/100ML; MG/100ML
INJECTION, SOLUTION INTRAVENOUS CONTINUOUS
Status: DISCONTINUED | OUTPATIENT
Start: 2024-03-22 | End: 2024-03-22

## 2024-03-22 RX ORDER — MEPERIDINE HYDROCHLORIDE 25 MG/ML
12.5 INJECTION INTRAMUSCULAR; INTRAVENOUS; SUBCUTANEOUS EVERY 5 MIN PRN
Status: DISCONTINUED | OUTPATIENT
Start: 2024-03-22 | End: 2024-03-22 | Stop reason: HOSPADM

## 2024-03-22 RX ORDER — ACETAMINOPHEN 650 MG/1
650 SUPPOSITORY RECTAL EVERY 6 HOURS PRN
Status: DISCONTINUED | OUTPATIENT
Start: 2024-03-22 | End: 2024-03-23 | Stop reason: HOSPADM

## 2024-03-22 RX ORDER — HYDROMORPHONE HYDROCHLORIDE 1 MG/ML
0.5 INJECTION, SOLUTION INTRAMUSCULAR; INTRAVENOUS; SUBCUTANEOUS EVERY 5 MIN PRN
Status: DISCONTINUED | OUTPATIENT
Start: 2024-03-22 | End: 2024-03-22 | Stop reason: HOSPADM

## 2024-03-22 RX ORDER — CALCIUM CARBONATE 500(1250)
1000 TABLET ORAL DAILY
COMMUNITY

## 2024-03-22 RX ORDER — MAGNESIUM HYDROXIDE 1200 MG/15ML
LIQUID ORAL CONTINUOUS PRN
Status: DISCONTINUED | OUTPATIENT
Start: 2024-03-22 | End: 2024-03-22 | Stop reason: HOSPADM

## 2024-03-22 RX ORDER — LABETALOL HYDROCHLORIDE 5 MG/ML
10 INJECTION, SOLUTION INTRAVENOUS
Status: DISCONTINUED | OUTPATIENT
Start: 2024-03-22 | End: 2024-03-22 | Stop reason: HOSPADM

## 2024-03-22 RX ORDER — ACETAMINOPHEN 325 MG/1
650 TABLET ORAL EVERY 6 HOURS PRN
Status: DISCONTINUED | OUTPATIENT
Start: 2024-03-22 | End: 2024-03-22 | Stop reason: SDUPTHER

## 2024-03-22 RX ORDER — PROCHLORPERAZINE EDISYLATE 5 MG/ML
5 INJECTION INTRAMUSCULAR; INTRAVENOUS
Status: DISCONTINUED | OUTPATIENT
Start: 2024-03-22 | End: 2024-03-22 | Stop reason: HOSPADM

## 2024-03-22 RX ORDER — SODIUM CHLORIDE 0.9 % (FLUSH) 0.9 %
5-40 SYRINGE (ML) INJECTION PRN
Status: DISCONTINUED | OUTPATIENT
Start: 2024-03-22 | End: 2024-03-23 | Stop reason: HOSPADM

## 2024-03-22 RX ORDER — ONDANSETRON 2 MG/ML
4 INJECTION INTRAMUSCULAR; INTRAVENOUS
Status: DISCONTINUED | OUTPATIENT
Start: 2024-03-22 | End: 2024-03-22 | Stop reason: HOSPADM

## 2024-03-22 RX ORDER — SENNOSIDES A AND B 8.6 MG/1
1 TABLET, FILM COATED ORAL DAILY PRN
Status: DISCONTINUED | OUTPATIENT
Start: 2024-03-22 | End: 2024-03-23 | Stop reason: HOSPADM

## 2024-03-22 RX ORDER — FENTANYL CITRATE 50 UG/ML
INJECTION, SOLUTION INTRAMUSCULAR; INTRAVENOUS PRN
Status: DISCONTINUED | OUTPATIENT
Start: 2024-03-22 | End: 2024-03-22 | Stop reason: SDUPTHER

## 2024-03-22 RX ORDER — LIDOCAINE HYDROCHLORIDE 20 MG/ML
INJECTION, SOLUTION INTRAVENOUS PRN
Status: DISCONTINUED | OUTPATIENT
Start: 2024-03-22 | End: 2024-03-22 | Stop reason: SDUPTHER

## 2024-03-22 RX ORDER — HYDRALAZINE HYDROCHLORIDE 20 MG/ML
10 INJECTION INTRAMUSCULAR; INTRAVENOUS
Status: DISCONTINUED | OUTPATIENT
Start: 2024-03-22 | End: 2024-03-22 | Stop reason: HOSPADM

## 2024-03-22 RX ORDER — GLYCOPYRROLATE 0.2 MG/ML
INJECTION INTRAMUSCULAR; INTRAVENOUS PRN
Status: DISCONTINUED | OUTPATIENT
Start: 2024-03-22 | End: 2024-03-22 | Stop reason: SDUPTHER

## 2024-03-22 RX ORDER — ONDANSETRON 2 MG/ML
4 INJECTION INTRAMUSCULAR; INTRAVENOUS EVERY 8 HOURS PRN
Status: DISCONTINUED | OUTPATIENT
Start: 2024-03-22 | End: 2024-03-22 | Stop reason: SDUPTHER

## 2024-03-22 RX ORDER — PROPOFOL 10 MG/ML
INJECTION, EMULSION INTRAVENOUS PRN
Status: DISCONTINUED | OUTPATIENT
Start: 2024-03-22 | End: 2024-03-22 | Stop reason: SDUPTHER

## 2024-03-22 RX ORDER — SODIUM CHLORIDE 0.9 % (FLUSH) 0.9 %
5-40 SYRINGE (ML) INJECTION EVERY 12 HOURS SCHEDULED
Status: DISCONTINUED | OUTPATIENT
Start: 2024-03-22 | End: 2024-03-22 | Stop reason: HOSPADM

## 2024-03-22 RX ORDER — OXYCODONE HYDROCHLORIDE 5 MG/1
5 TABLET ORAL
Status: DISCONTINUED | OUTPATIENT
Start: 2024-03-22 | End: 2024-03-22 | Stop reason: HOSPADM

## 2024-03-22 RX ORDER — SODIUM CHLORIDE, SODIUM LACTATE, POTASSIUM CHLORIDE, CALCIUM CHLORIDE 600; 310; 30; 20 MG/100ML; MG/100ML; MG/100ML; MG/100ML
1000 INJECTION, SOLUTION INTRAVENOUS CONTINUOUS
Status: DISCONTINUED | OUTPATIENT
Start: 2024-03-22 | End: 2024-03-22

## 2024-03-22 RX ADMIN — SODIUM CHLORIDE, POTASSIUM CHLORIDE, SODIUM LACTATE AND CALCIUM CHLORIDE: 600; 310; 30; 20 INJECTION, SOLUTION INTRAVENOUS at 03:37

## 2024-03-22 RX ADMIN — ACETAMINOPHEN 650 MG: 325 TABLET ORAL at 01:26

## 2024-03-22 RX ADMIN — GLYCOPYRROLATE 0.2 MG: 0.2 INJECTION INTRAMUSCULAR; INTRAVENOUS at 11:39

## 2024-03-22 RX ADMIN — LANSOPRAZOLE 15 MG: 15 TABLET, ORALLY DISINTEGRATING, DELAYED RELEASE ORAL at 08:46

## 2024-03-22 RX ADMIN — Medication 120 MG: at 11:14

## 2024-03-22 RX ADMIN — ONDANSETRON 4 MG: 2 INJECTION INTRAMUSCULAR; INTRAVENOUS at 11:22

## 2024-03-22 RX ADMIN — PROPOFOL 120 MG: 10 INJECTION, EMULSION INTRAVENOUS at 11:14

## 2024-03-22 RX ADMIN — SODIUM CHLORIDE, POTASSIUM CHLORIDE, SODIUM LACTATE AND CALCIUM CHLORIDE 1000 ML: 600; 310; 30; 20 INJECTION, SOLUTION INTRAVENOUS at 01:36

## 2024-03-22 RX ADMIN — LIDOCAINE HYDROCHLORIDE 100 MG: 20 INJECTION, SOLUTION INTRAVENOUS at 11:14

## 2024-03-22 RX ADMIN — PHENYLEPHRINE HYDROCHLORIDE 100 MCG: 10 INJECTION, SOLUTION INTRAMUSCULAR; INTRAVENOUS; SUBCUTANEOUS at 11:58

## 2024-03-22 RX ADMIN — SODIUM CHLORIDE, POTASSIUM CHLORIDE, SODIUM LACTATE AND CALCIUM CHLORIDE: 600; 310; 30; 20 INJECTION, SOLUTION INTRAVENOUS at 11:57

## 2024-03-22 RX ADMIN — ACETAMINOPHEN 650 MG: 325 TABLET ORAL at 08:45

## 2024-03-22 RX ADMIN — FENTANYL CITRATE 25 MCG: 50 INJECTION, SOLUTION INTRAMUSCULAR; INTRAVENOUS at 11:22

## 2024-03-22 RX ADMIN — SODIUM CHLORIDE, PRESERVATIVE FREE 10 ML: 5 INJECTION INTRAVENOUS at 21:00

## 2024-03-22 RX ADMIN — FENTANYL CITRATE 25 MCG: 50 INJECTION, SOLUTION INTRAMUSCULAR; INTRAVENOUS at 11:14

## 2024-03-22 RX ADMIN — ROCURONIUM BROMIDE 5 MG: 10 INJECTION, SOLUTION INTRAVENOUS at 11:14

## 2024-03-22 RX ADMIN — CEFTRIAXONE 1000 MG: 1 INJECTION, POWDER, FOR SOLUTION INTRAMUSCULAR; INTRAVENOUS at 07:39

## 2024-03-22 RX ADMIN — FENTANYL CITRATE 50 MCG: 50 INJECTION, SOLUTION INTRAMUSCULAR; INTRAVENOUS at 11:40

## 2024-03-22 RX ADMIN — ONDANSETRON 4 MG: 2 INJECTION INTRAMUSCULAR; INTRAVENOUS at 01:28

## 2024-03-22 RX ADMIN — DEXAMETHASONE SODIUM PHOSPHATE 8 MG: 4 INJECTION INTRA-ARTICULAR; INTRALESIONAL; INTRAMUSCULAR; INTRAVENOUS; SOFT TISSUE at 11:22

## 2024-03-22 RX ADMIN — SODIUM CHLORIDE, PRESERVATIVE FREE 10 ML: 5 INJECTION INTRAVENOUS at 08:46

## 2024-03-22 ASSESSMENT — PAIN SCALES - GENERAL
PAINLEVEL_OUTOF10: 7
PAINLEVEL_OUTOF10: 3
PAINLEVEL_OUTOF10: 5
PAINLEVEL_OUTOF10: 5
PAINLEVEL_OUTOF10: 3

## 2024-03-22 ASSESSMENT — PAIN - FUNCTIONAL ASSESSMENT
PAIN_FUNCTIONAL_ASSESSMENT: ACTIVITIES ARE NOT PREVENTED
PAIN_FUNCTIONAL_ASSESSMENT: 0-10
PAIN_FUNCTIONAL_ASSESSMENT: NONE - DENIES PAIN

## 2024-03-22 ASSESSMENT — PAIN DESCRIPTION - DESCRIPTORS: DESCRIPTORS: ACHING

## 2024-03-22 ASSESSMENT — LIFESTYLE VARIABLES
HOW MANY STANDARD DRINKS CONTAINING ALCOHOL DO YOU HAVE ON A TYPICAL DAY: PATIENT DOES NOT DRINK
HOW OFTEN DO YOU HAVE A DRINK CONTAINING ALCOHOL: NEVER

## 2024-03-22 ASSESSMENT — PAIN DESCRIPTION - LOCATION: LOCATION: HEAD

## 2024-03-22 NOTE — PROGRESS NOTES
4 Eyes Skin Assessment     NAME:  Maryan Yates  YOB: 1970  MEDICAL RECORD NUMBER:  [id number]    The patient is being assessed for  Admission    I agree that at least one RN has performed a thorough Head to Toe Skin Assessment on the patient. ALL assessment sites listed below have been assessed.      Areas assessed by both nurses:    Head, Face, Ears, Shoulders, Back, Chest, Arms, Elbows, Hands, Sacrum. Buttock, Coccyx, Ischium, Legs. Feet and Heels, and Under Medical Devices         Does the Patient have a Wound? No noted wound(s)       Neftaly Prevention initiated by RN: No  Wound Care Orders initiated by RN: No    Pressure Injury (Stage 3,4, Unstageable, DTI, NWPT, and Complex wounds) if present, place Wound referral order by RN under : No    New Ostomies, if present place, Ostomy referral order under : No     Nurse 1 eSignature: Electronically signed by Clementine Wilkinson RN on 3/22/24 at 7:52 AM EDT    **SHARE this note so that the co-signing nurse can place an eSignature**    Nurse 2 eSignature: Electronically signed by Olivia Ball RN on 3/22/24 at 6:55 PM EDT

## 2024-03-22 NOTE — PROGRESS NOTES
PACU Transfer Note    Vitals:    03/22/24 1300   BP: 104/68   Pulse: 91   Resp: 18   Temp: 100.4 °F (38 °C)   SpO2: 92%       In: 1740.1 [I.V.:1740.1]  Out: -     Pain assessment:  none      Report given to Receiving unit FELTON Lee.    3/22/2024 1:02 PM

## 2024-03-22 NOTE — CARE COORDINATION
Case Management Assessment  Initial Evaluation    Date/Time of Evaluation: 3/22/2024 1:31 PM  Assessment Completed by: Kerline Arias    If patient is discharged prior to next notation, then this note serves as note for discharge by case management.    Patient Name: Maryan Yates                   YOB: 1970  Diagnosis: Ureterolithiasis [N20.1]  Nephrolithiasis [N20.0]  Single functional kidney [Z90.5]                   Date / Time: 3/22/2024 12:29 AM    Patient Admission Status: Inpatient   Readmission Risk (Low < 19, Mod (19-27), High > 27): No data recorded  Current PCP: Tal Arana, DO  PCP verified by CM? No    Chart Reviewed: Yes      History Provided by: Patient  Patient Orientation: Alert and Oriented    Patient Cognition: Alert    Hospitalization in the last 30 days (Readmission):  No    If yes, Readmission Assessment in CM Navigator will be completed.    Advance Directives:      Code Status: Full Code   Patient's Primary Decision Maker is: Patient Declined (Legal Next of Kin Remains as Decision Maker)      Discharge Planning:    Patient lives with: Children Type of Home: House  Primary Care Giver: Self  Patient Support Systems include: Children, Family Members   Current Financial resources: Other (Comment) (Harsha HARRY)  Current community resources: None  Current services prior to admission: None            Current DME:              Type of Home Care services:  None    ADLS  Prior functional level: Independent in ADLs/IADLs  Current functional level: Independent in ADLs/IADLs    PT AM-PAC:   /24  OT AM-PAC:   /24    Family can provide assistance at DC: Yes  Would you like Case Management to discuss the discharge plan with any other family members/significant others, and if so, who? No  Plans to Return to Present Housing: Yes  Other Identified Issues/Barriers to RETURNING to current housing: None  Potential Assistance needed at discharge: N/A            Potential DME:    Patient expects to

## 2024-03-22 NOTE — PLAN OF CARE
Same-day admit note.  This is a 53-year-old female admitted with left-sided nephrolithiasis status post stent placement.  Did see her at bedside waking up from surgery.  Was receiving IV fluids.  Vital signs stable.  Asking for diet.  No concerns.  I will follow-up and reevaluate tomorrow.

## 2024-03-22 NOTE — ED NOTES
ED TO INPATIENT SBAR HANDOFF    Patient Name: Maryan Yates   :  1970  53 y.o.   MRN:  [id number]  Preferred Name  Maryan  ED Room #:  A11/A11-11  Family/Caregiver Present no   Restraints no   Sitter no   Sepsis Risk Score Sepsis Risk Score: 0.29    Situation  Code Status: Full Code No additional code details.    Allergies: Sulfa antibiotics, Codeine, Pork-derived products, Codeine, and Sulfa antibiotics  Weight: Patient Vitals for the past 96 hrs (Last 3 readings):   Weight   24 0035 95.7 kg (211 lb)     Arrived from: home  Chief Complaint:   Chief Complaint   Patient presents with    Flank Pain     Patient stated that she started having pain on Saturday, went to the urology group and they found a kidney stone, they told her to come her for [facility] Problem/Diagnosis:  Principal Problem:    Nephrolithiasis  Resolved Problems:    * No resolved hospital problems. *    Imaging:   No orders to display     Abnormal labs:   Abnormal Labs Reviewed   BASIC METABOLIC PANEL W/ REFLEX TO MG FOR LOW K - Abnormal; Notable for the following components:       Result Value    Glucose 105 (*)     All other components within normal limits     Critical values: no     Abnormal Assessment Findings: 5mm obstructing calculus left ureter.   Atrophic right kidney    Background  History:   Past Medical History:   Diagnosis Date    Acid reflux     Anesthesia     SLOW TO WAKE UP    Brain tumor (benign) (HCC)     Chronic GERD     Kidney anomaly, congenital     Kidney function abnormal     undeveloped kidney at birth (pt does not remeber which one)    Kidney stone     Liver spot     Prolonged emergence from general anesthesia     Thyroid disease        Assessment    Vitals/MEWS:    Level of Consciousness: Alert (0)   Vitals:    24 0035 24 0036   BP:  123/69   Pulse: 79    Resp: 18    Temp: 98.1 °F (36.7 °C)    SpO2: 100%    Weight: 95.7 kg (211 lb)    Height: 1.702 m (5' 7\")      FiO2 (%):   O2 Flow Rate:

## 2024-03-22 NOTE — ANESTHESIA PRE PROCEDURE
Department of Anesthesiology  Preprocedure Note       Name:  Maryan Yates   Age:  53 y.o.  :  1970                                          MRN:  [id number]         Date:  3/22/2024      Surgeon: Surgeon(s):  Jr Umanzor MD    Procedure: Procedure(s):  CYSTOSCOPY , LEFT STENT PLACEMENT, POSSIBLE LEFT URETEROSCOPY LASER, STONE MANIPULATION    Medications prior to admission:   Prior to Admission medications    Medication Sig Start Date End Date Taking? Authorizing Provider   vitamin D (VITAMIN D3) 50 MCG (2000) CAPS capsule Take 1 capsule by mouth daily   Yes Malvin May MD   Omega-3 Fatty Acids (FISH OIL OMEGA-3 PO) Take by mouth   Yes Malvin May MD   calcium carbonate (OSCAL) 500 MG TABS tablet Take 2 tablets by mouth daily   Yes Malvin May MD   ZINC OXIDE, TOPICAL, 10 % CREA Apply 1 application topically daily to affected region 23   Rich Cohen MD   ketoconazole (NIZORAL) 2 % cream Apply once daily to cover the affected and immediate surrounding area for 2 weeks. 23   Rich Cohen MD   Multiple Vitamin (MULTIVITAMIN, BARIATRIC FUSION COMPLETE, CHEW TAB) Take 2 tablets by mouth daily    Malvin May MD   Multiple Vitamins-Minerals (THERAPEUTIC MULTIVITAMIN-MINERALS) tablet Take 1 tablet by mouth daily    Malvin May MD   esomeprazole Magnesium (NEXIUM) 20 MG PACK Take 20 mg by mouth daily   22  Malvin May MD       Current medications:    Current Facility-Administered Medications   Medication Dose Route Frequency Provider Last Rate Last Admin   • HYDROmorphone HCl PF (DILAUDID) injection 0.5 mg  0.5 mg IntraVENous Q30 Min PRN Nilo Logan MD       • ondansetron (ZOFRAN) injection 4 mg  4 mg IntraVENous Q8H PRN Nilo Logan MD   4 mg at 24 0128   • lactated ringers IV soln infusion 1,000 mL  1,000 mL IntraVENous Continuous Nilo Logan MD   Stopped at 24 0312   • acetaminophen (TYLENOL) tablet 650 mg

## 2024-03-22 NOTE — H&P
Hospital Medicine History & Physical      PCP: Tal Arana DO    Date of Admission: 3/22/2024    Date of Service: Pt seen/examined and Admitted with expected LOS greater than two midnights due to medical therapy.     Chief Complaint:    Left flank pain nausea    History Of Present Illness:      This is a 53-year-old female who presented to the emergency room with a chief complaint of having left flank pain.    Patient has a history of single left functional kidney presented after urologist has diagnosed her with obstructing left ureteral urolithiasis of 5 mm.  Patient has been afebrile.  Denies any chest pain no shortness of breath no nausea vomiting abdominal pain no diarrhea no constipation.  Patient will be kept n.p.o. for possible urologic procedure in a.m.     Patient's past medical history significant for:    Past Medical History:          Diagnosis Date    Acid reflux     Anesthesia     SLOW TO WAKE UP    Brain tumor (benign) (HCC)     Chronic GERD     Kidney anomaly, congenital 2006    Kidney function abnormal     undeveloped kidney at birth (pt does not remeber which one)    Kidney stone     Liver spot     Prolonged emergence from general anesthesia     Thyroid disease          Past Surgical History:          Procedure Laterality Date    BACK SURGERY      L4-L5 herniated disc    BREAST BIOPSY Left 2023    benign     SECTION      CHOLECYSTECTOMY  2018    HIATAL HERNIA REPAIR N/A 2022    . performed by Eamon Meeks DO at [facility] OR    HYSTERECTOMY (CERVIX STATUS UNKNOWN)  07/15/2011    laproscopic supracervical hysterectomy    PARTIAL HYSTERECTOMY (CERVIX NOT REMOVED)      SLEEVE GASTRECTOMY N/A 2022    ROBOTIC ASSISTED LAPAROSCOPIC SLEEVE GASTRECTOMY/ HIATAL HERNIA REPAIR performed by Eamon Meeks DO at [facility] OR    TUBAL LIGATION      UPPER GASTROINTESTINAL ENDOSCOPY N/A 2022    EGD BIOPSY performed by Eamon Meeks DO at [facility] ENDOSCOPY       Medications Prior to Admission:

## 2024-03-22 NOTE — CONSULTS
Urology Attending Consult Note      Reason for Consultation: L 5mm UVJ stone, solitary kidney    History: 52 yo F with history of R renal atrophy and nephrolithiasis. She came to our office Wednesday complaining of L flank pain, nausea/vomiting. CT scan was ordered which shows a 5mm L UVJ stone with moderate hydronephrosis. Her UA in office was grossly positive and she was started on Cipro. She was directed to the hospital for admission given her symptoms and CT findings. Cr normal, UA showing leuks/blood. WBC normal, afebrile overnight.     Family History, Social History, Review of Systems:  Reviewed and agreed to as per chart    Vitals:  /65   Pulse 61   Temp 99 °F (37.2 °C) (Oral)   Resp 16   Ht 1.702 m (5' 7.01\")   Wt 95.8 kg (211 lb 3.2 oz)   LMP 2011   SpO2 99%   BMI 33.07 kg/m²   Temp  Av.6 °F (37 °C)  Min: 98.1 °F (36.7 °C)  Max: 99 °F (37.2 °C)    Intake/Output Summary (Last 24 hours) at 3/22/2024 0827  Last data filed at 3/22/2024 0711  Gross per 24 hour   Intake 640.1 ml   Output 500 ml   Net 140.1 ml         Physical:  Well developed, well nourished. Appears sick  Mood indicates no abnormalities. Pt doesn’t appear depressed  Orientated to time and place  Neck is supple, trachea is midline  Respiratory effort is normal        Labs:  WBC:    Lab Results   Component Value Date/Time    WBC 6.5 2024 12:59 AM     Hemoglobin/Hematocrit:    Lab Results   Component Value Date/Time    HGB 13.4 2024 12:59 AM    HCT 39.4 2024 12:59 AM     BMP:    Lab Results   Component Value Date/Time     2024 12:59 AM    K 3.9 2024 12:59 AM     2024 12:59 AM    CO2 27 2024 12:59 AM    BUN 10 2024 12:59 AM    LABALBU 4.8 2022 10:02 AM    CREATININE 0.8 2024 12:59 AM    CALCIUM 9.4 2024 12:59 AM    GFRAA >60 2022 06:21 AM    GFRAA >60 2011 12:23 PM    LABGLOM >60 2024 12:59 AM     PT/INR:    Lab Results

## 2024-03-22 NOTE — ANESTHESIA POSTPROCEDURE EVALUATION
Department of Anesthesiology  Postprocedure Note    Patient: Maryan Yates  MRN: [id number]  YOB: 1970  Date of evaluation: 3/22/2024    Procedure Summary       Date: 03/22/24 Room / Location: [place] [address] / [facility]    Anesthesia Start: 1110 Anesthesia Stop: 1227    Procedure: CYSTOSCOPY , LEFT STENT PLACEMENT,  LEFT URETEROSCOPY LASER, LEFT LASER LITHOTRIPSY; STONE MANIPULATION (Left: Ureter) Diagnosis:       Kidney stone on left side      (Kidney stone on left side [N20.0])    Surgeons: Jr Umanzor MD Responsible Provider: Avel Vega MD    Anesthesia Type: General ASA Status: 1            Anesthesia Type: General    Suraj Phase I: Suraj Score: 10    Suraj Phase II:      Anesthesia Post Evaluation    Patient location during evaluation: PACU  Patient participation: complete - patient participated  Level of consciousness: awake  Airway patency: patent  Nausea & Vomiting: no nausea and no vomiting  Cardiovascular status: blood pressure returned to baseline and hemodynamically stable  Respiratory status: acceptable  Hydration status: euvolemic  Multimodal analgesia pain management approach  Pain management: adequate    No notable events documented.

## 2024-03-22 NOTE — PROGRESS NOTES
Physical Therapy/ Occupational Therapy  Discharge    Orders for therapy have been received and chart reviewed.  Discussed with RN and pt not req PT/OT evaluation as pt is up ab lita in room.  Will sign off.      Elaine Mayo, SPT  Jeana Hopson, PT, DPT  [id number]   Jeannette Galeano, MOT, OTR/L

## 2024-03-22 NOTE — PROGRESS NOTES
Patient transfered to room 5302 from ED. Patient is A&O x 4. VSS. Patient oriented to the room all safety measures in place. Patient given IS and SCDs at this time. Admission orders released and patient 4 eyes completed. Admission documentation completed. No other needs are noted at this time.    [x] Bed alarm on and cord plugged into wall  [x] Bed in lowest position  [x] Call light and bedside table within reach  [x] Patient educated on all safety measures  []Oxygen connected to wall (if applicable)     Nurse 1 Esignature: Electronically signed by Clementine Wilkinson RN on 3/22/24 at 7:51 AM EDT  Nurse 2 Esignature: Electronically signed by Olivia Ball RN on 3/22/24 at 6:55 PM EDT

## 2024-03-22 NOTE — OP NOTE
REPORT OF OPERATION    DEPARTMENT: Urology    DATE OF OPERATION: 03/22/24    DICTATED BY: IRA LOPEZ MD    OPERATING SURGEON: IRA LOPEZ MD    PREOPERATIVE DIAGNOSIS: left nephrolithiasis.    POSTOPERATIVE DIAGNOSIS: left nephrolithiasis.    PROCEDURE:    1. Cystoscopy  2. left ureteroscopy.  3. Basket manipulation of stones.  4. left stent placement  5. Left laser lithotripsy    ANESTHESIA: General.    IV FLUID: EDI    ESTIMATED BLOOD LOSS: Minimal.    DRAINS: A 6 x 28 double J stent on the left side.    FINDINGS: An approximately 0.5 cm left distal ureteral stone that was   basketed and removed.  It was embedded in the mucosa so it had to be dislodged from this to treat it.    COMPLICATIONS: None identified intraoperatively    INDICATION FOR PROCEDURE: This is a very pleasant 61 y.o. female with a small left distal ureteral stone.  She also has an atrophic right kidney.  Thankfully, her creatinine has been normal.  She elected to undergo a left ureteroscopy, basket manipulation and and possible laser lithotripsy. Risks and benefits of the procedure were discussed with the patient including but not limited to bleeding, damage to surrounding structures, infection, ureteral avulsion and possible death. Informed consent was obtained.    DETAILS OF PROCEDURE: Patient was brought to the operating room, was identified correctly. The patient was given appropriate preoperative antibiotics. Sequential compression stockings were applied. After general anesthesia was induced the patient was placed in dorsal lithotomy position. The patient's groin and perineal area was prepped and draped in usual sterile fashion.     First we placed a 22French rigid cystoscope via urethra into the bladder. There were no mucosal lesions. Bilateral ureteral orifices were orthotopic. I then placed a wire up on the left side, all the way up to the left renal pelvis. Fluoroscopy was used for guidance.  Next, I then advanced a semi rigid  ureteroscope up to the level of the stone using a glide wire for assistance through the semirigid channel. I then looked up the ureter to localize the stone. We visualized a small pale stone. It was immobile and embedded in the lateral wall of the left distal ureter.  Next, I then utilized a 200 micron laser fiber at a setting of 0.8 J and 8 Hz to fragment the stone into multiple pieces.  Next, we then brought out a BiiCode zero tip basket and the stone was removed.  I was satisfied with our procedure. I then at this time took one last look at the distal and mid ureter.  No stones were seen.  At this point, over the wire I then placed a 6 x 28 double pigtail stent on the right. The bladder was then drained.    The patient was then placed back in supine position, was awakened from the anesthesia and transferred to PACU in stable condition.    Disposition: the patient will be transferred to the regular floor for further monitoring.  She can have a stent removal in 1-2 weeks.  Our office will set that up.

## 2024-03-22 NOTE — PROGRESS NOTES
Patient alert and oriented, vitals stable.  Denies pain.  Patient went to surgery for cysto today, returned.  Voiding, urine hazy yellow.  IV LAC infusing LR at 75 ml/hr.  Tolerating regular diet.  SBA post surgery, gait steady.  ST boots on, will monitor.

## 2024-03-23 VITALS
HEART RATE: 56 BPM | BODY MASS INDEX: 33.15 KG/M2 | HEIGHT: 67 IN | SYSTOLIC BLOOD PRESSURE: 101 MMHG | DIASTOLIC BLOOD PRESSURE: 61 MMHG | TEMPERATURE: 97.8 F | WEIGHT: 211.2 LBS | RESPIRATION RATE: 17 BRPM | OXYGEN SATURATION: 97 %

## 2024-03-23 LAB
ANION GAP SERPL CALCULATED.3IONS-SCNC: 12 MMOL/L (ref 3–16)
BACTERIA BLD CULT ORG #2: NORMAL
BACTERIA BLD CULT: NORMAL
BASOPHILS # BLD: 0 K/UL (ref 0–0.2)
BASOPHILS NFR BLD: 0.2 %
BUN SERPL-MCNC: 14 MG/DL (ref 7–20)
CALCIUM SERPL-MCNC: 8.9 MG/DL (ref 8.3–10.6)
CHLORIDE SERPL-SCNC: 102 MMOL/L (ref 99–110)
CO2 SERPL-SCNC: 26 MMOL/L (ref 21–32)
CREAT SERPL-MCNC: 0.8 MG/DL (ref 0.6–1.1)
DEPRECATED RDW RBC AUTO: 13.2 % (ref 12.4–15.4)
EOSINOPHIL # BLD: 0 K/UL (ref 0–0.6)
EOSINOPHIL NFR BLD: 0.1 %
GFR SERPLBLD CREATININE-BSD FMLA CKD-EPI: >60 ML/MIN/{1.73_M2}
GLUCOSE SERPL-MCNC: 95 MG/DL (ref 70–99)
HCT VFR BLD AUTO: 34.7 % (ref 36–48)
HGB BLD-MCNC: 11.8 G/DL (ref 12–16)
LACTATE BLDV-SCNC: 0.9 MMOL/L (ref 0.4–2)
LYMPHOCYTES # BLD: 1.5 K/UL (ref 1–5.1)
LYMPHOCYTES NFR BLD: 22.9 %
MCH RBC QN AUTO: 30.3 PG (ref 26–34)
MCHC RBC AUTO-ENTMCNC: 34 G/DL (ref 31–36)
MCV RBC AUTO: 89.2 FL (ref 80–100)
MONOCYTES # BLD: 0.9 K/UL (ref 0–1.3)
MONOCYTES NFR BLD: 12.9 %
NEUTROPHILS # BLD: 4.3 K/UL (ref 1.7–7.7)
NEUTROPHILS NFR BLD: 63.9 %
PLATELET # BLD AUTO: 210 K/UL (ref 135–450)
PMV BLD AUTO: 8.2 FL (ref 5–10.5)
POTASSIUM SERPL-SCNC: 4.5 MMOL/L (ref 3.5–5.1)
RBC # BLD AUTO: 3.89 M/UL (ref 4–5.2)
SODIUM SERPL-SCNC: 140 MMOL/L (ref 136–145)
WBC # BLD AUTO: 6.7 K/UL (ref 4–11)

## 2024-03-23 PROCEDURE — 80048 BASIC METABOLIC PNL TOTAL CA: CPT

## 2024-03-23 PROCEDURE — 6360000002 HC RX W HCPCS: Performed by: INTERNAL MEDICINE

## 2024-03-23 PROCEDURE — 6370000000 HC RX 637 (ALT 250 FOR IP): Performed by: INTERNAL MEDICINE

## 2024-03-23 PROCEDURE — 83605 ASSAY OF LACTIC ACID: CPT

## 2024-03-23 PROCEDURE — 85025 COMPLETE CBC W/AUTO DIFF WBC: CPT

## 2024-03-23 PROCEDURE — 2580000003 HC RX 258: Performed by: INTERNAL MEDICINE

## 2024-03-23 PROCEDURE — 36415 COLL VENOUS BLD VENIPUNCTURE: CPT

## 2024-03-23 RX ORDER — CIPROFLOXACIN 500 MG/1
500 TABLET, FILM COATED ORAL 2 TIMES DAILY
Qty: 6 TABLET | Refills: 0 | Status: SHIPPED | OUTPATIENT
Start: 2024-03-24 | End: 2024-03-27

## 2024-03-23 RX ADMIN — CEFTRIAXONE 1000 MG: 1 INJECTION, POWDER, FOR SOLUTION INTRAMUSCULAR; INTRAVENOUS at 07:12

## 2024-03-23 RX ADMIN — SODIUM CHLORIDE: 9 INJECTION, SOLUTION INTRAVENOUS at 07:08

## 2024-03-23 RX ADMIN — LANSOPRAZOLE 15 MG: 15 TABLET, ORALLY DISINTEGRATING, DELAYED RELEASE ORAL at 07:54

## 2024-03-23 NOTE — PROGRESS NOTES
Patient was provided with discharge instructions and verbalized understanding. IV access removed.     Electronically signed by David Alcantara RN on 3/23/2024 at 10:39 AM

## 2024-03-23 NOTE — PROGRESS NOTES
Pt is A&O x4, Vitals stable, no skin issues noted, denies pain,  PIVs assessed and flushed. Pt POD 0 from cysto for kidney stone, currently denies any pain. Bed locked in lowest position, call light bedside table and personal items in reach.  socks on and Pt independent and up as tolerated. No notable issues throughout shift. Pt has no question or concerns at this time. Will continue to monitor.

## 2024-03-23 NOTE — DISCHARGE SUMMARY
V2.0  Discharge Summary    Name:  Maryan Yates /Age/Sex: 1970 (53 y.o. female)   Admit Date: 3/22/2024  Discharge Date: 3/23/24    MRN & CSN:  [id number] & [id number] Encounter Date and Time 3/23/24 11:05 AM EDT    Attending:  No att. providers found Discharging Provider: Jordan El MD       Hospital Course:     53-year-old female presenting with left flank Pain and admitted for ureterolithiasis and moderate hydronephrosis.  She does have a history of a left single kidney.  Urology consulted and patient did get a stent placed.  She did have febrile episodes.  Was started on IV antibiotics with cultures negative.  Patient did respond favorably to the procedure and on 3/23/2024 was deemed fit for discharge.     Left ureteral urolithiasis  Moderate hydronephrosis  -Status post stent placement by urology.  Patient to follow-up outpatient for stent removal with urology services.  Preserve renal function.  - Will complete 5-day course of antibiotics.  Patient to be discharged on 3 days worth of ciprofloxacin to complete 5-day course.  Did receive ceftriaxone x 2 days while inpatient.    Single left kidney  -Adequate renal function.  To follow-up outpatient with PCP and subspecialists.    Anemia  -Suspect delusional in the setting of receiving IV fluids.  She should follow-up outpatient with PCP with a repeat CBC in about 1 week after discharge to assess improvement.        The patient expressed appropriate understanding of, and agreement with the discharge recommendations, medications, and plan.     Consults this admission:  None    Discharge Diagnosis:   Nephrolithiasis  Hydronephrosis  Single left kidney  Anemia    Discharge Instruction:   Follow up appointments:   Primary care physician: Tal Arana DO within 2 weeks  Diet: regular diet   Activity: activity as tolerated  Disposition: Discharged to:   [x]Home, []C, []SNF, []Acute Rehab, []Hospice   Condition on discharge: Stable  Labs

## 2024-03-23 NOTE — CARE COORDINATION
Case Management Assessment            Discharge Note                    Date / Time of Note: 3/23/2024 9:21 AM                  Discharge Note Completed by: Alina Garces RN    Patient Name: Maryan Yates   YOB: 1970  Diagnosis: Ureterolithiasis [N20.1]  Nephrolithiasis [N20.0]  Single functional kidney [Z90.5]   Date / Time: 3/22/2024 12:29 AM    Current PCP: Tal Arana DO  Clinic patient: No    Hospitalization in the last 30 days: No       Advance Directives:  Code Status: Full Code  [place] DNR form completed and on chart: No    Financial:  Payor: BCBS / Plan: BCBS - [place] PPO / Product Type: *No Product type* /      Pharmacy:    [facility]/pharmacy #[id number] - [place], [place] - [address] [address] -  [phone] - F [phone]  [address] [address]  Phone: [phone] Fax: [phone]      Assistance purchasing medications?: Potential Assistance Purchasing Medications: No  Assistance provided by Case Management: None at this time    Does patient want to participate in local refill/ meds to beds program?:      Meds To Beds General Rules:  1. Can ONLY be done Monday- Friday between 8:30am-5pm  2. Prescription(s) must be in pharmacy by 3pm to be filled same day  3.Copy of patient's insurance/ prescription drug card and patient face sheet must be sent along with the prescription(s)  4. Cost of Rx cannot be added to hospital bill. If financial assistance is needed, please contact unit  or ;  or  CANNOT provide pharmacy voucher for patients co-pays  5. Patients can then  the prescription on their way out of the hospital at discharge, or pharmacy can deliver to the bedside if staff is available. (payment due at time of pick-up or delivery - cash, check, or card accepted)     Able to afford home medications/ co-pay costs: Yes    ADLS:  Current PT AM-PAC Score:   /24  Current OT AM-PAC Score:   /24      DISCHARGE Disposition:

## 2024-03-26 LAB
BACTERIA BLD CULT ORG #2: NORMAL
BACTERIA BLD CULT: NORMAL

## 2024-03-27 LAB
APPEARANCE STONE: NORMAL
COMPN STONE: NORMAL
SPECIMEN WT: 16 MG

## 2024-04-08 ENCOUNTER — HOSPITAL ENCOUNTER (OUTPATIENT)
Dept: MAMMOGRAPHY | Age: 54
Discharge: HOME | End: 2024-04-08
Payer: COMMERCIAL

## 2024-04-08 ENCOUNTER — HOSPITAL ENCOUNTER (OUTPATIENT)
Dept: ULTRASOUND IMAGING | Age: 54
Discharge: HOME | End: 2024-04-08
Payer: COMMERCIAL

## 2024-04-08 DIAGNOSIS — R92.8 ABNORMAL MAMMOGRAM: ICD-10-CM

## 2024-04-08 PROCEDURE — G0279 TOMOSYNTHESIS, MAMMO: HCPCS

## 2024-04-08 PROCEDURE — 76642 ULTRASOUND BREAST LIMITED: CPT

## 2024-04-09 ENCOUNTER — TELEPHONE (OUTPATIENT)
Dept: VASCULAR SURGERY | Age: 54
End: 2024-04-09

## 2024-04-09 NOTE — TELEPHONE ENCOUNTER
Following up to see if patient was still interested in surgery.   Approval  from charlie [id number] still good.     Left message to call the office.

## 2024-08-06 ENCOUNTER — CLINICAL DOCUMENTATION (OUTPATIENT)
Dept: BARIATRICS/WEIGHT MGMT | Age: 54
End: 2024-08-06

## 2024-08-06 NOTE — PROGRESS NOTES
Dietary Assessment Note  This eval was conducted via phone on 8/6/24 in preparation for their visit on 8/7/24 with Dr. Meeks.      Vitals: 206 lb, per pt report.      Patient gained 8 lbs over past year.    Total Weight Loss: 54 lbs    Labs reviewed: note labs 3/23/24    Protein intake: 60-80 grams/day     Fluid intake: >64 oz/day- water    Multivitamin/mineral intake: yes - 1 fusion capsule w/ iron    Calcium intake: yes - 2 fusion soft chews    Other: none    Exercise:  as much as possible, when schedule allows, does get >09555    Nutrition Assessment: 2 year post-op visit.  Pt states it is just hard to maintain her wt if she is not actively watching.  Pt was in [place] for 6 weeks, back 8 days.  States she is walked 13 miles daily while gone.  A little surprised with wt gain but not overly concerned.  States she works 7 days a week just working to get back on track with meal planning/prepping.      B- fruit sometimes w/ COW or oatmeal OR HB  L- salad w/ protein  S- fruit OR granola  D- chicken OR beef w/ vegetables  S- occasionally - peanuts    Amount able to eat per sitting: ~1-1.5 cup volume    Following 30/30/30 rule: yes    Food Intolerances/issues:  temperature hot foods    Client Concerns: saw Dr. Cohen but insurance did not approve coverage    Goals:   - Regroup with pre-travel goals  - Plan/prep meals    Plan: f/u as directed    Jeannette Angelo, RD, LD

## 2024-08-06 NOTE — PROGRESS NOTES
Relation Age of Onset    Breast Cancer Mother 65    Cancer Mother         BREAST    Kidney Disease Mother     Cancer Father         PROSTATE    Stroke Father     Breast Cancer Maternal Aunt 60     Social History     Tobacco Use    Smoking status: Former     Current packs/day: 0.00     Average packs/day: 0.1 packs/day for 2.0 years (0.2 ttl pk-yrs)     Types: Cigarettes     Start date:      Quit date:      Years since quittin.6    Smokeless tobacco: Never   Substance Use Topics    Alcohol use: Not Currently     Comment: rare     I counseled the patient on the importance of not smoking and risks of ETOH.   Allergies   Allergen Reactions    Sulfa Antibiotics     Codeine     Pork-Derived Products Hives, Diarrhea and Itching     Vomiting. Pt can eat some pork products    Codeine Nausea And Vomiting    Sulfa Antibiotics Nausea And Vomiting     Vitals:    24 0727   BP: 117/77   Pulse: 83   Weight: 94.3 kg (208 lb)   Height: 1.715 m (5' 7.5\")       Body mass index is 32.1 kg/m².      Current Outpatient Medications:     vitamin D (VITAMIN D3) 50 MCG ( UT) CAPS capsule, Take 1 capsule by mouth daily, Disp: , Rfl:     Omega-3 Fatty Acids (FISH OIL OMEGA-3 PO), Take by mouth, Disp: , Rfl:     calcium carbonate (OSCAL) 500 MG TABS tablet, Take 2 tablets by mouth daily, Disp: , Rfl:     ZINC OXIDE, TOPICAL, 10 % CREA, Apply 1 application topically daily to affected region, Disp: 50 g, Rfl: 0    ketoconazole (NIZORAL) 2 % cream, Apply once daily to cover the affected and immediate surrounding area for 2 weeks., Disp: 30 g, Rfl: 1    Multiple Vitamin (MULTIVITAMIN, BARIATRIC FUSION COMPLETE, CHEW TAB), Take 2 tablets by mouth daily, Disp: , Rfl:     Multiple Vitamins-Minerals (THERAPEUTIC MULTIVITAMIN-MINERALS) tablet, Take 1 tablet by mouth daily, Disp: , Rfl:       Review of Systems - History obtained from the patient  General ROS: negative  Psychological ROS: negative  Hematological and Lymphatic ROS:

## 2024-08-07 ENCOUNTER — OFFICE VISIT (OUTPATIENT)
Dept: BARIATRICS/WEIGHT MGMT | Age: 54
End: 2024-08-07
Payer: COMMERCIAL

## 2024-08-07 VITALS
SYSTOLIC BLOOD PRESSURE: 117 MMHG | HEIGHT: 68 IN | WEIGHT: 208 LBS | HEART RATE: 83 BPM | BODY MASS INDEX: 31.52 KG/M2 | DIASTOLIC BLOOD PRESSURE: 77 MMHG

## 2024-08-07 DIAGNOSIS — Z98.84 STATUS POST LAPAROSCOPIC SLEEVE GASTRECTOMY: ICD-10-CM

## 2024-08-07 DIAGNOSIS — E66.9 OBESITY (BMI 30.0-34.9): Primary | ICD-10-CM

## 2024-08-07 PROCEDURE — 99214 OFFICE O/P EST MOD 30 MIN: CPT | Performed by: SURGERY

## 2024-12-27 ENCOUNTER — TELEPHONE (OUTPATIENT)
Dept: FAMILY MEDICINE CLINIC | Age: 54
End: 2024-12-27

## 2024-12-27 DIAGNOSIS — Z12.11 COLON CANCER SCREENING: Primary | ICD-10-CM

## 2024-12-27 NOTE — TELEPHONE ENCOUNTER
Patient calling in. Needs referral for screening colonoscopy. Scheduled for new to provider with Lisa. Referral pended. Please send patient referral info in my chart once referral is signed.   Future Appointments   Date Time Provider Department Center   2/13/2025  8:20 AM Claudine Pandey APRN - CNP [facility]

## 2025-02-13 ENCOUNTER — OFFICE VISIT (OUTPATIENT)
Dept: FAMILY MEDICINE CLINIC | Age: 55
End: 2025-02-13
Payer: COMMERCIAL

## 2025-02-13 VITALS
DIASTOLIC BLOOD PRESSURE: 82 MMHG | OXYGEN SATURATION: 97 % | HEART RATE: 97 BPM | SYSTOLIC BLOOD PRESSURE: 118 MMHG | HEIGHT: 68 IN | RESPIRATION RATE: 16 BRPM | WEIGHT: 225 LBS | TEMPERATURE: 97.7 F | BODY MASS INDEX: 34.1 KG/M2

## 2025-02-13 DIAGNOSIS — Z83.3 FAMILY HISTORY OF DIABETES MELLITUS TYPE II: ICD-10-CM

## 2025-02-13 DIAGNOSIS — Z80.3 FAMILY HISTORY OF BREAST CANCER IN MOTHER: ICD-10-CM

## 2025-02-13 DIAGNOSIS — E05.90 SUBCLINICAL HYPERTHYROIDISM: ICD-10-CM

## 2025-02-13 DIAGNOSIS — Z76.89 ENCOUNTER TO ESTABLISH CARE: Primary | ICD-10-CM

## 2025-02-13 DIAGNOSIS — E66.811 CLASS 1 OBESITY WITHOUT SERIOUS COMORBIDITY WITH BODY MASS INDEX (BMI) OF 34.0 TO 34.9 IN ADULT, UNSPECIFIED OBESITY TYPE: ICD-10-CM

## 2025-02-13 DIAGNOSIS — Z98.84 HISTORY OF GASTRIC BYPASS: ICD-10-CM

## 2025-02-13 DIAGNOSIS — H00.012 HORDEOLUM EXTERNUM OF RIGHT LOWER EYELID: ICD-10-CM

## 2025-02-13 PROBLEM — E66.01 MORBID OBESITY WITH BMI OF 40.0-44.9, ADULT: Status: RESOLVED | Noted: 2022-06-29 | Resolved: 2025-02-13

## 2025-02-13 PROBLEM — N20.0 NEPHROLITHIASIS: Status: RESOLVED | Noted: 2024-03-22 | Resolved: 2025-02-13

## 2025-02-13 PROBLEM — M25.511 RIGHT SHOULDER PAIN: Status: RESOLVED | Noted: 2022-06-20 | Resolved: 2025-02-13

## 2025-02-13 PROBLEM — N62 LARGE BREASTS: Status: RESOLVED | Noted: 2023-06-27 | Resolved: 2025-02-13

## 2025-02-13 PROBLEM — R92.8 ABNORMAL MAMMOGRAM: Status: RESOLVED | Noted: 2023-06-27 | Resolved: 2025-02-13

## 2025-02-13 PROBLEM — M76.892 TENDINITIS OF LEFT QUADRICEPS TENDON: Status: RESOLVED | Noted: 2021-11-08 | Resolved: 2025-02-13

## 2025-02-13 PROBLEM — E05.20 TOXIC NODULAR GOITER: Status: RESOLVED | Noted: 2021-07-06 | Resolved: 2025-02-13

## 2025-02-13 PROCEDURE — 99214 OFFICE O/P EST MOD 30 MIN: CPT

## 2025-02-13 RX ORDER — ERYTHROMYCIN 5 MG/G
OINTMENT OPHTHALMIC
Qty: 1 G | Refills: 0 | Status: SHIPPED | OUTPATIENT
Start: 2025-02-13

## 2025-02-13 SDOH — ECONOMIC STABILITY: FOOD INSECURITY: WITHIN THE PAST 12 MONTHS, YOU WORRIED THAT YOUR FOOD WOULD RUN OUT BEFORE YOU GOT MONEY TO BUY MORE.: NEVER TRUE

## 2025-02-13 SDOH — ECONOMIC STABILITY: FOOD INSECURITY: WITHIN THE PAST 12 MONTHS, THE FOOD YOU BOUGHT JUST DIDN'T LAST AND YOU DIDN'T HAVE MONEY TO GET MORE.: NEVER TRUE

## 2025-02-13 ASSESSMENT — ENCOUNTER SYMPTOMS
NAUSEA: 0
EYE DISCHARGE: 1
VOMITING: 0
EYE PAIN: 1
ABDOMINAL PAIN: 0
SHORTNESS OF BREATH: 0
COUGH: 0

## 2025-02-13 ASSESSMENT — PATIENT HEALTH QUESTIONNAIRE - PHQ9
SUM OF ALL RESPONSES TO PHQ QUESTIONS 1-9: 0
2. FEELING DOWN, DEPRESSED OR HOPELESS: NOT AT ALL
SUM OF ALL RESPONSES TO PHQ QUESTIONS 1-9: 0
SUM OF ALL RESPONSES TO PHQ9 QUESTIONS 1 & 2: 0
1. LITTLE INTEREST OR PLEASURE IN DOING THINGS: NOT AT ALL

## 2025-02-13 NOTE — PROGRESS NOTES
Substance and Sexual Activity    Alcohol use: Not Currently     Comment: rare    Drug use: No    Sexual activity: Not Currently   Other Topics Concern    Not on file   Social History Narrative    ** Merged History Encounter **          Social Determinants of Health     Financial Resource Strain: Low Risk  (6/27/2023)    Overall Financial Resource Strain (CARDIA)     Difficulty of Paying Living Expenses: Not hard at all   Food Insecurity: No Food Insecurity (2/13/2025)    Hunger Vital Sign     Worried About Running Out of Food in the Last Year: Never true     Ran Out of Food in the Last Year: Never true   Transportation Needs: No Transportation Needs (2/13/2025)    PRAPARE - Transportation     Lack of Transportation (Medical): No     Lack of Transportation (Non-Medical): No   Physical Activity: Not on file   Stress: Not on file   Social Connections: Not on file   Intimate Partner Violence: Unknown (1/21/2024)    Received from [employer] [facility], [employer] [facility] [employer] [facility]    Interpersonal Safety     Feel physically or emotionally unsafe where currently live: Not on file     Harm by anyone: Not on file     Emotionally Harmed: Not on file   Housing Stability: Low Risk  (2/13/2025)    Housing Stability Vital Sign     Unable to Pay for Housing in the Last Year: No     Number of Times Moved in the Last Year: 0     Homeless in the Last Year: No        Family History   Problem Relation Age of Onset    Breast Cancer Mother 65    Kidney Disease Mother     Diabetes Father     Cancer Father         PROSTATE    Stroke Father     Breast Cancer Maternal Aunt 60    Stroke Paternal Grandmother        PE  Vitals:    02/13/25 0824   BP: 118/82   Site: Left Upper Arm   Position: Sitting   Pulse: 97   Resp: 16   Temp: 97.7 °F (36.5 °C)   TempSrc: Temporal   SpO2: 97%   Weight: 102.1 kg (225 lb)   Height: 1.715 m (5' 7.5\")     Estimated body mass index is 34.72 kg/m² as calculated from the

## 2025-02-14 DIAGNOSIS — E66.811 CLASS 1 OBESITY WITHOUT SERIOUS COMORBIDITY WITH BODY MASS INDEX (BMI) OF 34.0 TO 34.9 IN ADULT, UNSPECIFIED OBESITY TYPE: ICD-10-CM

## 2025-02-14 DIAGNOSIS — Z83.3 FAMILY HISTORY OF DIABETES MELLITUS TYPE II: ICD-10-CM

## 2025-02-14 DIAGNOSIS — Z98.84 HISTORY OF GASTRIC BYPASS: ICD-10-CM

## 2025-02-14 LAB
25(OH)D3 SERPL-MCNC: 44.5 NG/ML
ALBUMIN SERPL-MCNC: 4.4 G/DL (ref 3.4–5)
ALBUMIN/GLOB SERPL: 1.7 {RATIO} (ref 1.1–2.2)
ALP SERPL-CCNC: 106 U/L (ref 40–129)
ALT SERPL-CCNC: 27 U/L (ref 10–40)
ANION GAP SERPL CALCULATED.3IONS-SCNC: 7 MMOL/L (ref 3–16)
AST SERPL-CCNC: 27 U/L (ref 15–37)
BASOPHILS # BLD: 0 K/UL (ref 0–0.2)
BASOPHILS NFR BLD: 0.9 %
BILIRUB SERPL-MCNC: 0.4 MG/DL (ref 0–1)
BUN SERPL-MCNC: 11 MG/DL (ref 7–20)
CALCIUM SERPL-MCNC: 9.8 MG/DL (ref 8.3–10.6)
CHLORIDE SERPL-SCNC: 103 MMOL/L (ref 99–110)
CHOLEST SERPL-MCNC: 180 MG/DL (ref 0–199)
CO2 SERPL-SCNC: 31 MMOL/L (ref 21–32)
CREAT SERPL-MCNC: 0.9 MG/DL (ref 0.6–1.1)
DEPRECATED RDW RBC AUTO: 13.9 % (ref 12.4–15.4)
EOSINOPHIL # BLD: 0.2 K/UL (ref 0–0.6)
EOSINOPHIL NFR BLD: 4.2 %
EST. AVERAGE GLUCOSE BLD GHB EST-MCNC: 108.3 MG/DL
GFR SERPLBLD CREATININE-BSD FMLA CKD-EPI: 77 ML/MIN/{1.73_M2}
GLUCOSE SERPL-MCNC: 100 MG/DL (ref 70–99)
HBA1C MFR BLD: 5.4 %
HCT VFR BLD AUTO: 44.2 % (ref 36–48)
HDLC SERPL-MCNC: 66 MG/DL (ref 40–60)
HGB BLD-MCNC: 15.1 G/DL (ref 12–16)
LDLC SERPL CALC-MCNC: 94 MG/DL
LYMPHOCYTES # BLD: 1.2 K/UL (ref 1–5.1)
LYMPHOCYTES NFR BLD: 29.5 %
MCH RBC QN AUTO: 30.6 PG (ref 26–34)
MCHC RBC AUTO-ENTMCNC: 34.3 G/DL (ref 31–36)
MCV RBC AUTO: 89.2 FL (ref 80–100)
MONOCYTES # BLD: 0.7 K/UL (ref 0–1.3)
MONOCYTES NFR BLD: 16 %
NEUTROPHILS # BLD: 2 K/UL (ref 1.7–7.7)
NEUTROPHILS NFR BLD: 49.4 %
PLATELET # BLD AUTO: 170 K/UL (ref 135–450)
PMV BLD AUTO: 9.2 FL (ref 5–10.5)
POTASSIUM SERPL-SCNC: 4.2 MMOL/L (ref 3.5–5.1)
PROT SERPL-MCNC: 7 G/DL (ref 6.4–8.2)
RBC # BLD AUTO: 4.95 M/UL (ref 4–5.2)
SODIUM SERPL-SCNC: 141 MMOL/L (ref 136–145)
TRIGL SERPL-MCNC: 100 MG/DL (ref 0–150)
VLDLC SERPL CALC-MCNC: 20 MG/DL
WBC # BLD AUTO: 4.1 K/UL (ref 4–11)

## 2025-02-27 ENCOUNTER — APPOINTMENT (OUTPATIENT)
Age: 55
End: 2025-02-27
Payer: COMMERCIAL

## 2025-02-27 ENCOUNTER — HOSPITAL ENCOUNTER (EMERGENCY)
Age: 55
Discharge: HOME | End: 2025-02-27
Attending: EMERGENCY MEDICINE
Payer: COMMERCIAL

## 2025-02-27 ENCOUNTER — OFFICE VISIT (OUTPATIENT)
Dept: PRIMARY CARE CLINIC | Age: 55
End: 2025-02-27
Payer: COMMERCIAL

## 2025-02-27 VITALS
RESPIRATION RATE: 16 BRPM | SYSTOLIC BLOOD PRESSURE: 107 MMHG | TEMPERATURE: 98.2 F | DIASTOLIC BLOOD PRESSURE: 71 MMHG | BODY MASS INDEX: 34.69 KG/M2 | OXYGEN SATURATION: 96 % | WEIGHT: 221 LBS | HEART RATE: 62 BPM | HEIGHT: 67 IN

## 2025-02-27 VITALS
SYSTOLIC BLOOD PRESSURE: 118 MMHG | OXYGEN SATURATION: 97 % | HEART RATE: 90 BPM | DIASTOLIC BLOOD PRESSURE: 74 MMHG | TEMPERATURE: 97.6 F

## 2025-02-27 DIAGNOSIS — R07.9 CHEST PAIN, UNSPECIFIED TYPE: Primary | ICD-10-CM

## 2025-02-27 DIAGNOSIS — R00.1 SINUS BRADYCARDIA: ICD-10-CM

## 2025-02-27 LAB
ALBUMIN SERPL-MCNC: 4.1 G/DL (ref 3.4–5)
ALBUMIN/GLOB SERPL: 1.4 {RATIO}
ALP SERPL-CCNC: 92 U/L (ref 40–129)
ALT SERPL-CCNC: 27 U/L (ref 10–40)
ANION GAP SERPL CALCULATED.3IONS-SCNC: 12 MMOL/L (ref 3–16)
AST SERPL-CCNC: 32 U/L (ref 15–37)
BASOPHILS # BLD: 0.03 K/UL (ref 0–0.2)
BASOPHILS NFR BLD: 1 %
BILIRUB SERPL-MCNC: 0.3 MG/DL (ref 0–1)
BUN SERPL-MCNC: 14 MG/DL (ref 7–20)
CALCIUM SERPL-MCNC: 9.1 MG/DL (ref 8.3–10.6)
CHLORIDE SERPL-SCNC: 103 MMOL/L (ref 99–110)
CO2 SERPL-SCNC: 25 MMOL/L (ref 21–32)
CREAT SERPL-MCNC: 0.8 MG/DL (ref 0.5–1)
EOSINOPHIL # BLD: 0.15 K/UL (ref 0–0.6)
EOSINOPHILS RELATIVE PERCENT: 2 %
ERYTHROCYTE [DISTWIDTH] IN BLOOD BY AUTOMATED COUNT: 12.9 % (ref 12.4–15.4)
GFR, ESTIMATED: 87 ML/MIN/1.73M2
GLUCOSE SERPL-MCNC: 132 MG/DL (ref 70–99)
HCT VFR BLD AUTO: 40.4 % (ref 36–48)
HGB BLD-MCNC: 13.7 G/DL (ref 12–16)
IMM GRANULOCYTES # BLD AUTO: 0.01 K/UL (ref 0–0.5)
IMM GRANULOCYTES NFR BLD: 0 %
LYMPHOCYTES NFR BLD: 2.4 K/UL (ref 1–5.1)
LYMPHOCYTES RELATIVE PERCENT: 38 %
MCH RBC QN AUTO: 29.6 PG (ref 26–34)
MCHC RBC AUTO-ENTMCNC: 33.9 G/DL (ref 31–36)
MCV RBC AUTO: 87.3 FL (ref 80–100)
MONOCYTES NFR BLD: 0.58 K/UL (ref 0–1.3)
MONOCYTES NFR BLD: 9 %
NEUTROPHILS NFR BLD: 49 %
NEUTS SEG NFR BLD: 3.08 K/UL (ref 1.7–7.7)
PLATELET # BLD AUTO: 206 K/UL (ref 135–450)
PMV BLD AUTO: 10.1 FL
POTASSIUM SERPL-SCNC: 3.8 MMOL/L (ref 3.5–5.1)
PROT SERPL-MCNC: 6.9 G/DL (ref 6.4–8.2)
RBC # BLD AUTO: 4.63 M/UL (ref 4–5.2)
SODIUM SERPL-SCNC: 141 MMOL/L (ref 136–145)
TROPONIN I SERPL HS-MCNC: <6 NG/L (ref 0–14)
WBC OTHER # BLD: 6.3 K/UL (ref 4–11)

## 2025-02-27 PROCEDURE — 71045 X-RAY EXAM CHEST 1 VIEW: CPT

## 2025-02-27 PROCEDURE — 85025 COMPLETE CBC W/AUTO DIFF WBC: CPT

## 2025-02-27 PROCEDURE — 99285 EMERGENCY DEPT VISIT HI MDM: CPT

## 2025-02-27 PROCEDURE — 6370000000 HC RX 637 (ALT 250 FOR IP): Performed by: EMERGENCY MEDICINE

## 2025-02-27 PROCEDURE — 93005 ELECTROCARDIOGRAM TRACING: CPT

## 2025-02-27 PROCEDURE — 93000 ELECTROCARDIOGRAM COMPLETE: CPT

## 2025-02-27 PROCEDURE — 84484 ASSAY OF TROPONIN QUANT: CPT

## 2025-02-27 PROCEDURE — 80053 COMPREHEN METABOLIC PANEL: CPT

## 2025-02-27 PROCEDURE — 99213 OFFICE O/P EST LOW 20 MIN: CPT

## 2025-02-27 RX ORDER — ACETAMINOPHEN 500 MG
1000 TABLET ORAL ONCE
Status: COMPLETED | OUTPATIENT
Start: 2025-02-27 | End: 2025-02-27

## 2025-02-27 RX ADMIN — ACETAMINOPHEN 1000 MG: 500 TABLET ORAL at 21:15

## 2025-02-27 ASSESSMENT — LIFESTYLE VARIABLES
HOW OFTEN DO YOU HAVE A DRINK CONTAINING ALCOHOL: NEVER
HOW MANY STANDARD DRINKS CONTAINING ALCOHOL DO YOU HAVE ON A TYPICAL DAY: PATIENT DOES NOT DRINK

## 2025-02-27 ASSESSMENT — PAIN DESCRIPTION - DESCRIPTORS: DESCRIPTORS: PRESSURE

## 2025-02-27 ASSESSMENT — ENCOUNTER SYMPTOMS
NAUSEA: 0
SHORTNESS OF BREATH: 1
ABDOMINAL PAIN: 0
STRIDOR: 0
EYES NEGATIVE: 1
CHEST TIGHTNESS: 0
BACK PAIN: 1
CONSTIPATION: 0
TROUBLE SWALLOWING: 0
SINUS PRESSURE: 0
WHEEZING: 0
ALLERGIC/IMMUNOLOGIC NEGATIVE: 1
DIARRHEA: 0
HEMOPTYSIS: 0
ABDOMINAL DISTENTION: 0
COUGH: 0
RHINORRHEA: 0
VOMITING: 0
SORE THROAT: 0

## 2025-02-27 ASSESSMENT — PAIN DESCRIPTION - PAIN TYPE: TYPE: ACUTE PAIN

## 2025-02-27 ASSESSMENT — PAIN SCALES - GENERAL
PAINLEVEL_OUTOF10: 5
PAINLEVEL_OUTOF10: 4

## 2025-02-27 ASSESSMENT — PAIN DESCRIPTION - LOCATION
LOCATION: CHEST
LOCATION: CHEST

## 2025-02-27 ASSESSMENT — PAIN DESCRIPTION - ORIENTATION: ORIENTATION: LEFT

## 2025-02-27 NOTE — ED TRIAGE NOTES
Pt presents to the ED with complaints of chest pressure that has lasted for a few days. Pt states her heart rate runs low at baseline. Pt states she also has dyspnea on excretion.

## 2025-02-27 NOTE — PROGRESS NOTES
Nose: Nose normal. No congestion or rhinorrhea.      Mouth/Throat:      Mouth: Mucous membranes are moist.      Pharynx: Oropharynx is clear. No posterior oropharyngeal erythema.   Eyes:      General:         Right eye: No discharge.         Left eye: No discharge.      Extraocular Movements: Extraocular movements intact.      Conjunctiva/sclera: Conjunctivae normal.      Pupils: Pupils are equal, round, and reactive to light.   Cardiovascular:      Rate and Rhythm: Normal rate and regular rhythm.      Pulses: Normal pulses.      Heart sounds: Normal heart sounds. No murmur heard.  Pulmonary:      Effort: Pulmonary effort is normal.      Breath sounds: Normal breath sounds. No wheezing or rhonchi.   Abdominal:      General: Bowel sounds are normal.      Palpations: Abdomen is soft.      Tenderness: There is no abdominal tenderness.      Hernia: No hernia is present.   Musculoskeletal:         General: No swelling or tenderness. Normal range of motion.      Cervical back: Normal range of motion and neck supple. No tenderness.      Right lower leg: No edema.      Left lower leg: No edema.   Lymphadenopathy:      Cervical: No cervical adenopathy.   Skin:     General: Skin is warm and dry.      Findings: No erythema or rash.   Neurological:      General: No focal deficit present.      Mental Status: She is alert and oriented to person, place, and time. Mental status is at baseline.   Psychiatric:         Mood and Affect: Mood normal.         Behavior: Behavior normal.         Thought Content: Thought content normal.         Judgment: Judgment normal.         Yohana Sanchez, APRN - CNP

## 2025-02-28 ASSESSMENT — HEART SCORE: ECG: NORMAL

## 2025-02-28 NOTE — ED PROVIDER NOTES
type           DISPOSITION/PLAN:   Pt discharged home in stable condition.         PATIENT REFERRED TO:   Claudine Pandey APRN - CNP  [address] [id number] [phone]    In 2 days      [address]  [address]  [facility] [address] [phone]  Call in 1 day  Call in the morning to move up your existing appointment.       DISCHARGE MEDICATIONS:   Discharge Medication List as of 2/27/2025  9:43 PM           DISCONTINUED MEDICATIONS:   Discharge Medication List as of 2/27/2025  9:43 PM        STOP taking these medications       esomeprazole Magnesium (NEXIUM) 20 MG PACK Comments:   Reason for Stopping:                      Charles Cuadra DO (electronically signed)         Charles Cuadra DO  02/28/25 [id number]

## 2025-02-28 NOTE — ED NOTES
Patient prepared for and ready to be discharged. Patient discharged at this time to home in care of self in no acute distress after verbalizing understanding of discharge instructions. Patient left after receiving After Visit Summary instructions. Pt offered and declined wheelchair to lobby. Pt ambulated out of ED under own power.

## 2025-02-28 NOTE — DISCHARGE INSTRUCTIONS
Return to emergency department if worsening recurrent chest pain difficulty breathing worsening or any problems.    Tylenol for discomfort.  No heavy lifting bending or straining.

## 2025-03-03 NOTE — PROGRESS NOTES
included cigarettes. She started smoking about 41 years ago. She has a 0.2 pack-year smoking history. She has never been exposed to tobacco smoke. She has never used smokeless tobacco. She reports that she does not currently use alcohol. She reports that she does not use drugs.   FamHx:   Family History   Problem Relation Age of Onset    Breast Cancer Mother 65    Kidney Disease Mother     Diabetes Father     Cancer Father         PROSTATE    Stroke Father     Breast Cancer Maternal Aunt 60    Stroke Paternal Grandmother      Allergies: Sulfa antibiotics, Codeine, and Pork-derived products     MEDICATIONS      Prior to Admission medications    Medication Sig Start Date End Date Taking? Authorizing Provider   calcium carbonate (OSCAL) 500 MG TABS tablet Take 2 tablets by mouth daily   Yes Malvin May MD   Multiple Vitamin (MULTIVITAMIN, BARIATRIC FUSION COMPLETE, CHEW TAB) Take 2 tablets by mouth daily   Yes Malvin May MD   esomeprazole Magnesium (NEXIUM) 20 MG PACK Take 20 mg by mouth daily   7/27/22  Malvin May MD     PHYSICAL EXAM        Vitals:    03/21/25 1452   BP: 110/78   Pulse: 77   SpO2: 99%    Weight - Scale: 99.8 kg (220 lb)     Gen Alert, cooperative, no distress Heart  Regular rate and rhythm, no murmur.  Normal S1 and S2.  Warm/well perfused.  No JVD.    HEENT Normocephalic, atraumatic.  Conj/corn clear  Lips, mucosa, tongue normal Abd  Soft, nontender, nondistended, no organomegaly   Neck Supple, trachea midline Ext  Ext nl, AT, no C/C, no edema   Lungs Lungs clear to auscultation bilaterally, unlabored breathing Pulse 2+ and symmetric   Chest wall No deformity Skin Color/text/turg nl, no rash/lesions   Neuro No gross deficits Psych Nl mood and affect     LABS and Imaging     Relevant and available CV data reviewed    CARDIAC IMAGING/TESTING:  EKG personally interpreted: 2/27/25  Sinus Rhythm -With rate variation   cv = 14.  WITHIN NORMAL LIMITS    Echo:   7/21/2022

## 2025-03-06 ENCOUNTER — TELEPHONE (OUTPATIENT)
Dept: CARDIOLOGY CLINIC | Age: 55
End: 2025-03-06

## 2025-03-21 ENCOUNTER — OFFICE VISIT (OUTPATIENT)
Age: 55
End: 2025-03-21
Payer: COMMERCIAL

## 2025-03-21 VITALS
BODY MASS INDEX: 34.53 KG/M2 | SYSTOLIC BLOOD PRESSURE: 110 MMHG | OXYGEN SATURATION: 99 % | WEIGHT: 220 LBS | DIASTOLIC BLOOD PRESSURE: 78 MMHG | HEART RATE: 77 BPM | HEIGHT: 67 IN

## 2025-03-21 DIAGNOSIS — R00.1 BRADYCARDIA: Primary | ICD-10-CM

## 2025-03-21 PROCEDURE — 99203 OFFICE O/P NEW LOW 30 MIN: CPT | Performed by: INTERNAL MEDICINE

## 2025-03-27 ENCOUNTER — OFFICE VISIT (OUTPATIENT)
Dept: PRIMARY CARE CLINIC | Age: 55
End: 2025-03-27
Payer: COMMERCIAL

## 2025-03-27 VITALS
TEMPERATURE: 97.7 F | OXYGEN SATURATION: 98 % | SYSTOLIC BLOOD PRESSURE: 112 MMHG | BODY MASS INDEX: 35.24 KG/M2 | WEIGHT: 225 LBS | HEART RATE: 80 BPM | DIASTOLIC BLOOD PRESSURE: 60 MMHG

## 2025-03-27 DIAGNOSIS — R05.1 ACUTE COUGH: ICD-10-CM

## 2025-03-27 DIAGNOSIS — J01.11 ACUTE RECURRENT FRONTAL SINUSITIS: Primary | ICD-10-CM

## 2025-03-27 DIAGNOSIS — H65.193 ACUTE EFFUSION OF BOTH MIDDLE EARS: ICD-10-CM

## 2025-03-27 PROCEDURE — 99213 OFFICE O/P EST LOW 20 MIN: CPT

## 2025-03-27 RX ORDER — CETIRIZINE HYDROCHLORIDE 10 MG/1
10 TABLET ORAL DAILY
COMMUNITY
Start: 2025-03-27

## 2025-03-27 RX ORDER — AZITHROMYCIN 250 MG/1
TABLET, FILM COATED ORAL
Qty: 6 TABLET | Refills: 0 | Status: SHIPPED | OUTPATIENT
Start: 2025-03-27 | End: 2025-04-06

## 2025-03-27 ASSESSMENT — ENCOUNTER SYMPTOMS
SINUS PAIN: 1
VOMITING: 0
ALLERGIC/IMMUNOLOGIC NEGATIVE: 1
ABDOMINAL PAIN: 0
TROUBLE SWALLOWING: 0
CHEST TIGHTNESS: 0
STRIDOR: 0
WHEEZING: 0
NAUSEA: 0
COUGH: 1
SINUS PRESSURE: 1
EYES NEGATIVE: 1
CONSTIPATION: 0
DIARRHEA: 0
RHINORRHEA: 0
SHORTNESS OF BREATH: 0
SORE THROAT: 0
ABDOMINAL DISTENTION: 0

## 2025-03-27 NOTE — PROGRESS NOTES
[facility]  3/27/2025    Maryan Yates (:  1970) is a 54 y.o. female, here for evaluation of the following medical concerns:    Chief Complaint   Patient presents with    Head Congestion     Drainage,started two days ago  Flying out in two days    Cough        ASSESSMENT/ PLAN  Assessment & Plan  Acute recurrent frontal sinusitis   Acute condition, new, Supportive care with appropriate antipyretics and fluids.  Educational material distributed and questions answered.    Orders:    azithromycin (ZITHROMAX) 250 MG tablet; 500mg on day 1 followed by 250mg on days 2 - 5    cetirizine (ZYRTEC) 10 MG tablet; Take 1 tablet by mouth daily    Acute cough   Acute condition, new, Supportive care with appropriate antipyretics and fluids.  Educational material distributed and questions answered.Increase fluids.    Orders:    cetirizine (ZYRTEC) 10 MG tablet; Take 1 tablet by mouth daily    Acute effusion of both middle ears   Acute condition, new, Supportive care with appropriate antipyretics and fluids.  Educational material distributed and questions answered. Use otc allergy meds    Orders:    cetirizine (ZYRTEC) 10 MG tablet; Take 1 tablet by mouth daily         Return if symptoms worsen or fail to improve.    HPI  Chief Complaint:  The patient presents with annual head congestion, which she describes as a sinus infection occurring at this time of year. He is seeking treatment due to an upcoming flight in two days and expresses concern about potential discomfort during air travel.    History:  - The patient experiences head congestion and a cough associated with postnasal drip.  - He has been managing symptoms with DayQuil, taking doses yesterday morning before a dental appointment and in the afternoon.  - The patient denies any chronic respiratory conditions such as asthma or COPD.  - The patient carries Benadryl with her at all times due to these allergies.  - He denies taking any

## 2025-05-06 ENCOUNTER — OFFICE VISIT (OUTPATIENT)
Dept: FAMILY MEDICINE CLINIC | Age: 55
End: 2025-05-06
Payer: COMMERCIAL

## 2025-05-06 VITALS
HEART RATE: 68 BPM | TEMPERATURE: 97.7 F | RESPIRATION RATE: 16 BRPM | OXYGEN SATURATION: 98 % | SYSTOLIC BLOOD PRESSURE: 109 MMHG | WEIGHT: 224.6 LBS | BODY MASS INDEX: 35.18 KG/M2 | DIASTOLIC BLOOD PRESSURE: 74 MMHG

## 2025-05-06 DIAGNOSIS — M54.16 LUMBAR RADICULOPATHY: ICD-10-CM

## 2025-05-06 DIAGNOSIS — M54.50 LUMBAR BACK PAIN: ICD-10-CM

## 2025-05-06 DIAGNOSIS — Z01.818 PRE-OP EXAMINATION: Primary | ICD-10-CM

## 2025-05-06 PROBLEM — G93.89 BRAIN MASS: Status: RESOLVED | Noted: 2020-10-12 | Resolved: 2025-05-06

## 2025-05-06 PROCEDURE — 93000 ELECTROCARDIOGRAM COMPLETE: CPT

## 2025-05-06 PROCEDURE — 99214 OFFICE O/P EST MOD 30 MIN: CPT

## 2025-05-06 ASSESSMENT — PATIENT HEALTH QUESTIONNAIRE - PHQ9
2. FEELING DOWN, DEPRESSED OR HOPELESS: NOT AT ALL
SUM OF ALL RESPONSES TO PHQ QUESTIONS 1-9: 0
1. LITTLE INTEREST OR PLEASURE IN DOING THINGS: NOT AT ALL

## 2025-05-06 NOTE — PROGRESS NOTES
Pre-operative History and Physical      DIAGNOSIS:  Lumbar Back Pain    PROCEDURE:  LUMBAR 4 - SACRAL 1 ANTERIOR LUMBAR INTERBODY FUSION WITH PEDICLE SCREW FIXATION, POSSIBLE FACETECTOMY     History Obtained From:  Patient    HISTORY OF PRESENT ILLNESS:    The patient is a 54 y.o. female with significant past medical history of kidney anomaly, Chronic GERD, Subclinical thyroid disease (not on medication), Meningioma .I am seeing this patient for preop consultation for Dr. Mark Anthony Waller.      Denies fevers, chills, diaphoresis, CP, SOB, dysphagia, abd pain, urinary complaints, new edema, rashes, cyanosis    Past Medical History:   Diagnosis Date    Acid reflux     Anesthesia     SLOW TO WAKE UP    Brain tumor (benign) (HCC)     Chronic GERD     Kidney anomaly, congenital     Kidney function abnormal     undeveloped kidney at birth (pt does not remeber which one)    Kidney stone     Liver spot     Meningioma (HCC) 2006    Prolonged emergence from general anesthesia     Thyroid disease     Toxic nodular goiter 2021     Past Surgical History:   Procedure Laterality Date    BACK SURGERY      L4-L5 herniated disc    BREAST BIOPSY Left 2023    benign     SECTION      CHOLECYSTECTOMY  2018    CYSTOSCOPY Left 3/22/2024    CYSTOSCOPY , LEFT STENT PLACEMENT,  LEFT URETEROSCOPY LASER, LEFT LASER LITHOTRIPSY; STONE MANIPULATION performed by Jr Umanzor MD at [facility] OR    HIATAL HERNIA REPAIR N/A 2022    . performed by Eamon Meeks DO at [facility] OR    HYSTERECTOMY (CERVIX STATUS UNKNOWN)  07/15/2011    laproscopic supracervical hysterectomy    PARTIAL HYSTERECTOMY (CERVIX NOT REMOVED)      SLEEVE GASTRECTOMY N/A 2022    ROBOTIC ASSISTED LAPAROSCOPIC SLEEVE GASTRECTOMY/ HIATAL HERNIA REPAIR performed by Eamon Meeks DO at [facility] OR    TUBAL LIGATION      UPPER GASTROINTESTINAL ENDOSCOPY N/A 2022    EGD BIOPSY performed by Eamon Meeks DO at [facility] ENDOSCOPY     Current Outpatient

## 2025-05-06 NOTE — H&P (VIEW-ONLY)
Pre-operative History and Physical      DIAGNOSIS:  Lumbar Back Pain    PROCEDURE:  LUMBAR 4 - SACRAL 1 ANTERIOR LUMBAR INTERBODY FUSION WITH PEDICLE SCREW FIXATION, POSSIBLE FACETECTOMY     History Obtained From:  Patient    HISTORY OF PRESENT ILLNESS:    The patient is a 54 y.o. female with significant past medical history of kidney anomaly, Chronic GERD, Subclinical thyroid disease (not on medication), Meningioma .I am seeing this patient for preop consultation for Dr. Mark Anthony Waller.      Denies fevers, chills, diaphoresis, CP, SOB, dysphagia, abd pain, urinary complaints, new edema, rashes, cyanosis    Past Medical History:   Diagnosis Date    Acid reflux     Anesthesia     SLOW TO WAKE UP    Brain tumor (benign) (HCC)     Chronic GERD     Kidney anomaly, congenital     Kidney function abnormal     undeveloped kidney at birth (pt does not remeber which one)    Kidney stone     Liver spot     Meningioma (HCC) 2006    Prolonged emergence from general anesthesia     Thyroid disease     Toxic nodular goiter 2021     Past Surgical History:   Procedure Laterality Date    BACK SURGERY      L4-L5 herniated disc    BREAST BIOPSY Left 2023    benign     SECTION      CHOLECYSTECTOMY  2018    CYSTOSCOPY Left 3/22/2024    CYSTOSCOPY , LEFT STENT PLACEMENT,  LEFT URETEROSCOPY LASER, LEFT LASER LITHOTRIPSY; STONE MANIPULATION performed by Jr Umanzor MD at [facility] OR    HIATAL HERNIA REPAIR N/A 2022    . performed by Eamon Meeks DO at [facility] OR    HYSTERECTOMY (CERVIX STATUS UNKNOWN)  07/15/2011    laproscopic supracervical hysterectomy    PARTIAL HYSTERECTOMY (CERVIX NOT REMOVED)      SLEEVE GASTRECTOMY N/A 2022    ROBOTIC ASSISTED LAPAROSCOPIC SLEEVE GASTRECTOMY/ HIATAL HERNIA REPAIR performed by Eamon Meeks DO at [facility] OR    TUBAL LIGATION      UPPER GASTROINTESTINAL ENDOSCOPY N/A 2022    EGD BIOPSY performed by Eamon Meeks DO at [facility] ENDOSCOPY     Current Outpatient

## 2025-05-19 NOTE — PROGRESS NOTES
[facility] PRE-SURGICAL TESTING INSTRUCTIONS                      PRIOR TO PROCEDURE DATE:    1. PLEASE FOLLOW ANY INSTRUCTIONS GIVEN TO YOU PER YOUR SURGEON.      2. Arrange for someone to drive you home and be with you for the first 24 hours after discharge for your safety after your procedure for which you received sedation. Ensure it is someone we can share information with regarding your discharge.     NOTE: At this time ONLY 2 ADULTS may accompany you   One person ENCOURAGED to stay at hospital entire time if outpatient surgery      3. You must contact your surgeon for instructions IF:  You are taking any blood thinners, aspirin, anti-inflammatory or vitamins.  There is a change in your physical condition such as a cold, fever, rash, cuts, sores, or any other infection, especially near your surgical site.    4. Do not drink alcohol the day before or day of your procedure.  Do not use any recreational marijuana at least 24 hours or street drugs (heroin, cocaine) at minimum 5 days prior to your procedure.     5. A Pre-Surgical History and Physical MUST be completed WITHIN 30 DAYS OR LESS prior to your procedure.by your Physician or an Urgent Care        THE DAY OF YOUR PROCEDURE:  1.  Follow instructions for ARRIVAL TIME as DIRECTED BY YOUR SURGEON.     2. Enter the MAIN entrance from [facility] and follow the signs to the free Parking Garage or  Parking (offered free of charge 7 am-5pm).      3. Enter the Main Entrance of the hospital (do not enter from the lower level of the parking garage). Upon entrance, check in with the  at the surgical information desk on your LEFT.   Bring your insurance card and photo ID to register      4. DO NOT EAT ANYTHING 8 hours prior to arrival for surgery.  You may have up to 8 ounces of water 4 hours prior to your arrival for surgery.   NOTE: ALL Gastric, Bariatric & Bowel surgery patients - you MUST follow your surgeon's instructions regarding

## 2025-05-22 ENCOUNTER — ANESTHESIA EVENT (OUTPATIENT)
Dept: OPERATING ROOM | Age: 55
End: 2025-05-22
Payer: COMMERCIAL

## 2025-05-22 ENCOUNTER — ANESTHESIA (OUTPATIENT)
Dept: OPERATING ROOM | Age: 55
End: 2025-05-22
Payer: COMMERCIAL

## 2025-05-22 ENCOUNTER — APPOINTMENT (OUTPATIENT)
Dept: GENERAL RADIOLOGY | Age: 55
DRG: 428 | End: 2025-05-22
Attending: NEUROLOGICAL SURGERY
Payer: COMMERCIAL

## 2025-05-22 ENCOUNTER — HOSPITAL ENCOUNTER (INPATIENT)
Age: 55
LOS: 2 days | Discharge: HOME | DRG: 428 | End: 2025-05-24
Attending: NEUROLOGICAL SURGERY | Admitting: NEUROLOGICAL SURGERY
Payer: COMMERCIAL

## 2025-05-22 DIAGNOSIS — Z98.1 S/P LUMBAR FUSION: Primary | ICD-10-CM

## 2025-05-22 PROBLEM — M48.062 SPINAL STENOSIS, LUMBAR REGION WITH NEUROGENIC CLAUDICATION: Status: ACTIVE | Noted: 2025-05-22

## 2025-05-22 LAB
ABO/RH: NORMAL
ANTIBODY SCREEN: NORMAL

## 2025-05-22 PROCEDURE — 6360000002 HC RX W HCPCS: Performed by: ANESTHESIOLOGY

## 2025-05-22 PROCEDURE — 1200000000 HC SEMI PRIVATE

## 2025-05-22 PROCEDURE — 2500000003 HC RX 250 WO HCPCS: Performed by: PHYSICIAN ASSISTANT

## 2025-05-22 PROCEDURE — 94761 N-INVAS EAR/PLS OXIMETRY MLT: CPT

## 2025-05-22 PROCEDURE — 2700000000 HC OXYGEN THERAPY PER DAY

## 2025-05-22 PROCEDURE — 6360000002 HC RX W HCPCS: Performed by: NEUROLOGICAL SURGERY

## 2025-05-22 PROCEDURE — 7100000000 HC PACU RECOVERY - FIRST 15 MIN: Performed by: NEUROLOGICAL SURGERY

## 2025-05-22 PROCEDURE — 8E0WXBG COMPUTER ASSISTED PROCEDURE OF TRUNK REGION, WITH COMPUTERIZED TOMOGRAPHY: ICD-10-PCS | Performed by: NEUROLOGICAL SURGERY

## 2025-05-22 PROCEDURE — 6370000000 HC RX 637 (ALT 250 FOR IP): Performed by: PHYSICIAN ASSISTANT

## 2025-05-22 PROCEDURE — 0SG00J1 FUSION OF LUMBAR VERTEBRAL JOINT WITH SYNTHETIC SUBSTITUTE, POSTERIOR APPROACH, POSTERIOR COLUMN, OPEN APPROACH: ICD-10-PCS | Performed by: NEUROLOGICAL SURGERY

## 2025-05-22 PROCEDURE — 86900 BLOOD TYPING SEROLOGIC ABO: CPT

## 2025-05-22 PROCEDURE — 7100000001 HC PACU RECOVERY - ADDTL 15 MIN: Performed by: NEUROLOGICAL SURGERY

## 2025-05-22 PROCEDURE — 0SG3071 FUSION OF LUMBOSACRAL JOINT WITH AUTOLOGOUS TISSUE SUBSTITUTE, POSTERIOR APPROACH, POSTERIOR COLUMN, OPEN APPROACH: ICD-10-PCS | Performed by: NEUROLOGICAL SURGERY

## 2025-05-22 PROCEDURE — 86850 RBC ANTIBODY SCREEN: CPT

## 2025-05-22 PROCEDURE — C1762 CONN TISS, HUMAN(INC FASCIA): HCPCS | Performed by: NEUROLOGICAL SURGERY

## 2025-05-22 PROCEDURE — 2500000003 HC RX 250 WO HCPCS

## 2025-05-22 PROCEDURE — 2580000003 HC RX 258: Performed by: ANESTHESIOLOGY

## 2025-05-22 PROCEDURE — C1713 ANCHOR/SCREW BN/BN,TIS/BN: HCPCS | Performed by: NEUROLOGICAL SURGERY

## 2025-05-22 PROCEDURE — 3600000004 HC SURGERY LEVEL 4 BASE: Performed by: NEUROLOGICAL SURGERY

## 2025-05-22 PROCEDURE — 86901 BLOOD TYPING SEROLOGIC RH(D): CPT

## 2025-05-22 PROCEDURE — 0SB20ZZ EXCISION OF LUMBAR VERTEBRAL DISC, OPEN APPROACH: ICD-10-PCS | Performed by: NEUROLOGICAL SURGERY

## 2025-05-22 PROCEDURE — 2500000003 HC RX 250 WO HCPCS: Performed by: ANESTHESIOLOGY

## 2025-05-22 PROCEDURE — 0SG30A0 FUSION OF LUMBOSACRAL JOINT WITH INTERBODY FUSION DEVICE, ANTERIOR APPROACH, ANTERIOR COLUMN, OPEN APPROACH: ICD-10-PCS | Performed by: NEUROLOGICAL SURGERY

## 2025-05-22 PROCEDURE — 3700000000 HC ANESTHESIA ATTENDED CARE: Performed by: NEUROLOGICAL SURGERY

## 2025-05-22 PROCEDURE — 2709999900 HC NON-CHARGEABLE SUPPLY: Performed by: NEUROLOGICAL SURGERY

## 2025-05-22 PROCEDURE — 6360000002 HC RX W HCPCS

## 2025-05-22 PROCEDURE — C1821 INTERSPINOUS IMPLANT: HCPCS | Performed by: NEUROLOGICAL SURGERY

## 2025-05-22 PROCEDURE — 2720000010 HC SURG SUPPLY STERILE: Performed by: NEUROLOGICAL SURGERY

## 2025-05-22 PROCEDURE — 2580000003 HC RX 258: Performed by: PHYSICIAN ASSISTANT

## 2025-05-22 PROCEDURE — 3600000014 HC SURGERY LEVEL 4 ADDTL 15MIN: Performed by: NEUROLOGICAL SURGERY

## 2025-05-22 PROCEDURE — 72100 X-RAY EXAM L-S SPINE 2/3 VWS: CPT

## 2025-05-22 PROCEDURE — 2500000003 HC RX 250 WO HCPCS: Performed by: NEUROLOGICAL SURGERY

## 2025-05-22 PROCEDURE — 3700000001 HC ADD 15 MINUTES (ANESTHESIA): Performed by: NEUROLOGICAL SURGERY

## 2025-05-22 PROCEDURE — 3E0U0GB INTRODUCTION OF RECOMBINANT BONE MORPHOGENETIC PROTEIN INTO JOINTS, OPEN APPROACH: ICD-10-PCS | Performed by: NEUROLOGICAL SURGERY

## 2025-05-22 PROCEDURE — 0SB40ZZ EXCISION OF LUMBOSACRAL DISC, OPEN APPROACH: ICD-10-PCS | Performed by: NEUROLOGICAL SURGERY

## 2025-05-22 PROCEDURE — 6360000002 HC RX W HCPCS: Performed by: PHYSICIAN ASSISTANT

## 2025-05-22 PROCEDURE — 94150 VITAL CAPACITY TEST: CPT

## 2025-05-22 PROCEDURE — 0SG0071 FUSION OF LUMBAR VERTEBRAL JOINT WITH AUTOLOGOUS TISSUE SUBSTITUTE, POSTERIOR APPROACH, POSTERIOR COLUMN, OPEN APPROACH: ICD-10-PCS | Performed by: NEUROLOGICAL SURGERY

## 2025-05-22 PROCEDURE — 0SG30J1 FUSION OF LUMBOSACRAL JOINT WITH SYNTHETIC SUBSTITUTE, POSTERIOR APPROACH, POSTERIOR COLUMN, OPEN APPROACH: ICD-10-PCS | Performed by: NEUROLOGICAL SURGERY

## 2025-05-22 PROCEDURE — 0SG00A0 FUSION OF LUMBAR VERTEBRAL JOINT WITH INTERBODY FUSION DEVICE, ANTERIOR APPROACH, ANTERIOR COLUMN, OPEN APPROACH: ICD-10-PCS | Performed by: NEUROLOGICAL SURGERY

## 2025-05-22 DEVICE — GRAFT BNE SM 3 CC MTRX FIBERGRAFT BG: Type: IMPLANTABLE DEVICE | Site: SPINE LUMBAR | Status: FUNCTIONAL

## 2025-05-22 DEVICE — SET SCR SPNL TI SGL INNR FOR VIPER 2 MINIMALLY INVASIVE: Type: IMPLANTABLE DEVICE | Site: SPINE LUMBAR | Status: FUNCTIONAL

## 2025-05-22 DEVICE — SCREW SPNL L50MM OD6MM CORT FIX TI POLYAX FEN EXT TAB MIS: Type: IMPLANTABLE DEVICE | Site: SPINE LUMBAR | Status: FUNCTIONAL

## 2025-05-22 DEVICE — SCREW SPNL L45MM OD7MM CORT FIX TI POLYAX FEN EXT TAB MIS: Type: IMPLANTABLE DEVICE | Site: SPINE LUMBAR | Status: FUNCTIONAL

## 2025-05-22 DEVICE — GRAFT BNE MED 6.25 CC MTRX FIBERGRAFT BG: Type: IMPLANTABLE DEVICE | Site: SPINE LUMBAR | Status: FUNCTIONAL

## 2025-05-22 DEVICE — SPACER SPNL M 3.8ML H13.5X9.4MM 10DEG G PEEK FOR ANT LUM: Type: IMPLANTABLE DEVICE | Site: SPINE LUMBAR | Status: FUNCTIONAL

## 2025-05-22 DEVICE — BONE GRAFT KIT 7510200 INFUSE SMALL
Type: IMPLANTABLE DEVICE | Site: SPINE LUMBAR | Status: FUNCTIONAL
Brand: INFUSE® BONE GRAFT

## 2025-05-22 DEVICE — ROD SPNL L70MM DIA5.5MM POST PEDCL TI LORDOSED VIPER 2: Type: IMPLANTABLE DEVICE | Site: SPINE LUMBAR | Status: FUNCTIONAL

## 2025-05-22 DEVICE — SCREW SPNL L20MM DIA4MM SCLEROTIC TI ALLY ST LOK FN TIP: Type: IMPLANTABLE DEVICE | Site: SPINE LUMBAR | Status: FUNCTIONAL

## 2025-05-22 DEVICE — SCREW SPNL L45MM OD6MM CORT FIX TI POLYAX FEN EXT TAB MIS: Type: IMPLANTABLE DEVICE | Site: SPINE LUMBAR | Status: FUNCTIONAL

## 2025-05-22 DEVICE — SPACER SPNL M H13.5X7.4MM 14DEG 3.4CC ANTR LUM INTBDY FUS: Type: IMPLANTABLE DEVICE | Site: SPINE LUMBAR | Status: FUNCTIONAL

## 2025-05-22 RX ORDER — ONDANSETRON 4 MG/1
4 TABLET, ORALLY DISINTEGRATING ORAL EVERY 8 HOURS PRN
Status: DISCONTINUED | OUTPATIENT
Start: 2025-05-22 | End: 2025-05-24 | Stop reason: HOSPADM

## 2025-05-22 RX ORDER — SODIUM CHLORIDE 0.9 % (FLUSH) 0.9 %
5-40 SYRINGE (ML) INJECTION PRN
Status: DISCONTINUED | OUTPATIENT
Start: 2025-05-22 | End: 2025-05-22 | Stop reason: HOSPADM

## 2025-05-22 RX ORDER — SODIUM CHLORIDE 9 MG/ML
INJECTION, SOLUTION INTRAVENOUS PRN
Status: DISCONTINUED | OUTPATIENT
Start: 2025-05-22 | End: 2025-05-24 | Stop reason: HOSPADM

## 2025-05-22 RX ORDER — NALOXONE HYDROCHLORIDE 0.4 MG/ML
INJECTION, SOLUTION INTRAMUSCULAR; INTRAVENOUS; SUBCUTANEOUS PRN
Status: DISCONTINUED | OUTPATIENT
Start: 2025-05-22 | End: 2025-05-22 | Stop reason: HOSPADM

## 2025-05-22 RX ORDER — PHENYLEPHRINE HCL IN 0.9% NACL 1 MG/10 ML
SYRINGE (ML) INTRAVENOUS
Status: DISCONTINUED | OUTPATIENT
Start: 2025-05-22 | End: 2025-05-22 | Stop reason: SDUPTHER

## 2025-05-22 RX ORDER — KETAMINE HCL IN NACL, ISO-OSM 20 MG/2 ML
SYRINGE (ML) INJECTION
Status: DISCONTINUED | OUTPATIENT
Start: 2025-05-22 | End: 2025-05-22 | Stop reason: SDUPTHER

## 2025-05-22 RX ORDER — GLYCOPYRROLATE 0.2 MG/ML
INJECTION INTRAMUSCULAR; INTRAVENOUS
Status: DISCONTINUED | OUTPATIENT
Start: 2025-05-22 | End: 2025-05-22 | Stop reason: SDUPTHER

## 2025-05-22 RX ORDER — METHOCARBAMOL 100 MG/ML
1000 INJECTION, SOLUTION INTRAMUSCULAR; INTRAVENOUS EVERY 8 HOURS
Status: DISCONTINUED | OUTPATIENT
Start: 2025-05-22 | End: 2025-05-24 | Stop reason: HOSPADM

## 2025-05-22 RX ORDER — SODIUM CHLORIDE, SODIUM LACTATE, POTASSIUM CHLORIDE, CALCIUM CHLORIDE 600; 310; 30; 20 MG/100ML; MG/100ML; MG/100ML; MG/100ML
INJECTION, SOLUTION INTRAVENOUS CONTINUOUS
Status: DISCONTINUED | OUTPATIENT
Start: 2025-05-22 | End: 2025-05-22 | Stop reason: HOSPADM

## 2025-05-22 RX ORDER — ONDANSETRON 2 MG/ML
4 INJECTION INTRAMUSCULAR; INTRAVENOUS
Status: DISCONTINUED | OUTPATIENT
Start: 2025-05-22 | End: 2025-05-22 | Stop reason: HOSPADM

## 2025-05-22 RX ORDER — ROCURONIUM BROMIDE 10 MG/ML
INJECTION, SOLUTION INTRAVENOUS
Status: DISCONTINUED | OUTPATIENT
Start: 2025-05-22 | End: 2025-05-22 | Stop reason: SDUPTHER

## 2025-05-22 RX ORDER — ONDANSETRON 2 MG/ML
INJECTION INTRAMUSCULAR; INTRAVENOUS
Status: DISCONTINUED | OUTPATIENT
Start: 2025-05-22 | End: 2025-05-22 | Stop reason: SDUPTHER

## 2025-05-22 RX ORDER — LABETALOL HYDROCHLORIDE 5 MG/ML
10 INJECTION, SOLUTION INTRAVENOUS
Status: DISCONTINUED | OUTPATIENT
Start: 2025-05-22 | End: 2025-05-22 | Stop reason: HOSPADM

## 2025-05-22 RX ORDER — SODIUM CHLORIDE, SODIUM LACTATE, POTASSIUM CHLORIDE, CALCIUM CHLORIDE 600; 310; 30; 20 MG/100ML; MG/100ML; MG/100ML; MG/100ML
INJECTION, SOLUTION INTRAVENOUS CONTINUOUS
Status: DISCONTINUED | OUTPATIENT
Start: 2025-05-22 | End: 2025-05-23

## 2025-05-22 RX ORDER — MAGNESIUM HYDROXIDE 1200 MG/15ML
LIQUID ORAL CONTINUOUS PRN
Status: DISCONTINUED | OUTPATIENT
Start: 2025-05-22 | End: 2025-05-22 | Stop reason: HOSPADM

## 2025-05-22 RX ORDER — PROCHLORPERAZINE EDISYLATE 5 MG/ML
5 INJECTION INTRAMUSCULAR; INTRAVENOUS
Status: COMPLETED | OUTPATIENT
Start: 2025-05-22 | End: 2025-05-22

## 2025-05-22 RX ORDER — OXYCODONE HYDROCHLORIDE 5 MG/1
10 TABLET ORAL EVERY 4 HOURS PRN
Status: DISCONTINUED | OUTPATIENT
Start: 2025-05-22 | End: 2025-05-24 | Stop reason: HOSPADM

## 2025-05-22 RX ORDER — SODIUM CHLORIDE 0.9 % (FLUSH) 0.9 %
5-40 SYRINGE (ML) INJECTION EVERY 12 HOURS SCHEDULED
Status: DISCONTINUED | OUTPATIENT
Start: 2025-05-22 | End: 2025-05-22 | Stop reason: HOSPADM

## 2025-05-22 RX ORDER — FENTANYL CITRATE 50 UG/ML
25 INJECTION, SOLUTION INTRAMUSCULAR; INTRAVENOUS EVERY 5 MIN PRN
Status: DISCONTINUED | OUTPATIENT
Start: 2025-05-22 | End: 2025-05-22 | Stop reason: HOSPADM

## 2025-05-22 RX ORDER — OXYCODONE HYDROCHLORIDE 5 MG/1
5 TABLET ORAL
Status: DISCONTINUED | OUTPATIENT
Start: 2025-05-22 | End: 2025-05-22 | Stop reason: HOSPADM

## 2025-05-22 RX ORDER — METHOCARBAMOL 100 MG/ML
INJECTION, SOLUTION INTRAMUSCULAR; INTRAVENOUS
Status: DISCONTINUED | OUTPATIENT
Start: 2025-05-22 | End: 2025-05-22 | Stop reason: SDUPTHER

## 2025-05-22 RX ORDER — SUCCINYLCHOLINE/SOD CL,ISO/PF 200MG/10ML
SYRINGE (ML) INTRAVENOUS
Status: DISCONTINUED | OUTPATIENT
Start: 2025-05-22 | End: 2025-05-22 | Stop reason: SDUPTHER

## 2025-05-22 RX ORDER — MORPHINE SULFATE 2 MG/ML
2 INJECTION, SOLUTION INTRAMUSCULAR; INTRAVENOUS
Status: DISCONTINUED | OUTPATIENT
Start: 2025-05-22 | End: 2025-05-24 | Stop reason: HOSPADM

## 2025-05-22 RX ORDER — DIAZEPAM 5 MG/1
5 TABLET ORAL EVERY 6 HOURS PRN
Status: DISCONTINUED | OUTPATIENT
Start: 2025-05-22 | End: 2025-05-24 | Stop reason: HOSPADM

## 2025-05-22 RX ORDER — MIDAZOLAM HYDROCHLORIDE 1 MG/ML
INJECTION, SOLUTION INTRAMUSCULAR; INTRAVENOUS
Status: DISCONTINUED | OUTPATIENT
Start: 2025-05-22 | End: 2025-05-22 | Stop reason: SDUPTHER

## 2025-05-22 RX ORDER — LIDOCAINE HYDROCHLORIDE 20 MG/ML
INJECTION, SOLUTION INTRAVENOUS
Status: DISCONTINUED | OUTPATIENT
Start: 2025-05-22 | End: 2025-05-22 | Stop reason: SDUPTHER

## 2025-05-22 RX ORDER — SODIUM CHLORIDE 0.9 % (FLUSH) 0.9 %
5-40 SYRINGE (ML) INJECTION EVERY 12 HOURS SCHEDULED
Status: DISCONTINUED | OUTPATIENT
Start: 2025-05-22 | End: 2025-05-24 | Stop reason: HOSPADM

## 2025-05-22 RX ORDER — ENOXAPARIN SODIUM 100 MG/ML
30 INJECTION SUBCUTANEOUS 2 TIMES DAILY
Status: DISCONTINUED | OUTPATIENT
Start: 2025-05-23 | End: 2025-05-24 | Stop reason: HOSPADM

## 2025-05-22 RX ORDER — DIPHENHYDRAMINE HYDROCHLORIDE 50 MG/ML
12.5 INJECTION, SOLUTION INTRAMUSCULAR; INTRAVENOUS
Status: DISCONTINUED | OUTPATIENT
Start: 2025-05-22 | End: 2025-05-22 | Stop reason: HOSPADM

## 2025-05-22 RX ORDER — SODIUM CHLORIDE 0.9 % (FLUSH) 0.9 %
5-40 SYRINGE (ML) INJECTION PRN
Status: DISCONTINUED | OUTPATIENT
Start: 2025-05-22 | End: 2025-05-24 | Stop reason: HOSPADM

## 2025-05-22 RX ORDER — OXYCODONE HYDROCHLORIDE 5 MG/1
5 TABLET ORAL EVERY 4 HOURS PRN
Status: DISCONTINUED | OUTPATIENT
Start: 2025-05-22 | End: 2025-05-24 | Stop reason: HOSPADM

## 2025-05-22 RX ORDER — PROPOFOL 10 MG/ML
INJECTION, EMULSION INTRAVENOUS
Status: DISCONTINUED | OUTPATIENT
Start: 2025-05-22 | End: 2025-05-22 | Stop reason: SDUPTHER

## 2025-05-22 RX ORDER — SODIUM CHLORIDE 9 MG/ML
INJECTION, SOLUTION INTRAVENOUS CONTINUOUS
Status: DISCONTINUED | OUTPATIENT
Start: 2025-05-22 | End: 2025-05-23

## 2025-05-22 RX ORDER — MORPHINE SULFATE 4 MG/ML
4 INJECTION, SOLUTION INTRAMUSCULAR; INTRAVENOUS
Status: DISCONTINUED | OUTPATIENT
Start: 2025-05-22 | End: 2025-05-24 | Stop reason: HOSPADM

## 2025-05-22 RX ORDER — DEXAMETHASONE SODIUM PHOSPHATE 4 MG/ML
INJECTION, SOLUTION INTRA-ARTICULAR; INTRALESIONAL; INTRAMUSCULAR; INTRAVENOUS; SOFT TISSUE
Status: DISCONTINUED | OUTPATIENT
Start: 2025-05-22 | End: 2025-05-22 | Stop reason: SDUPTHER

## 2025-05-22 RX ORDER — SODIUM CHLORIDE 9 MG/ML
INJECTION, SOLUTION INTRAVENOUS PRN
Status: DISCONTINUED | OUTPATIENT
Start: 2025-05-22 | End: 2025-05-22 | Stop reason: HOSPADM

## 2025-05-22 RX ORDER — FENTANYL CITRATE 50 UG/ML
INJECTION, SOLUTION INTRAMUSCULAR; INTRAVENOUS
Status: DISCONTINUED | OUTPATIENT
Start: 2025-05-22 | End: 2025-05-22 | Stop reason: SDUPTHER

## 2025-05-22 RX ORDER — LIDOCAINE HYDROCHLORIDE 10 MG/ML
1 INJECTION, SOLUTION EPIDURAL; INFILTRATION; INTRACAUDAL; PERINEURAL
Status: DISCONTINUED | OUTPATIENT
Start: 2025-05-22 | End: 2025-05-22 | Stop reason: HOSPADM

## 2025-05-22 RX ORDER — ONDANSETRON 2 MG/ML
4 INJECTION INTRAMUSCULAR; INTRAVENOUS EVERY 6 HOURS PRN
Status: DISCONTINUED | OUTPATIENT
Start: 2025-05-22 | End: 2025-05-24 | Stop reason: HOSPADM

## 2025-05-22 RX ORDER — ACETAMINOPHEN 325 MG/1
650 TABLET ORAL EVERY 6 HOURS
Status: DISCONTINUED | OUTPATIENT
Start: 2025-05-22 | End: 2025-05-24 | Stop reason: HOSPADM

## 2025-05-22 RX ORDER — MEPERIDINE HYDROCHLORIDE 25 MG/ML
12.5 INJECTION INTRAMUSCULAR; INTRAVENOUS; SUBCUTANEOUS EVERY 5 MIN PRN
Status: DISCONTINUED | OUTPATIENT
Start: 2025-05-22 | End: 2025-05-22 | Stop reason: HOSPADM

## 2025-05-22 RX ORDER — IPRATROPIUM BROMIDE AND ALBUTEROL SULFATE 2.5; .5 MG/3ML; MG/3ML
1 SOLUTION RESPIRATORY (INHALATION)
Status: DISCONTINUED | OUTPATIENT
Start: 2025-05-22 | End: 2025-05-22 | Stop reason: HOSPADM

## 2025-05-22 RX ORDER — HYDROMORPHONE HYDROCHLORIDE 1 MG/ML
0.5 INJECTION, SOLUTION INTRAMUSCULAR; INTRAVENOUS; SUBCUTANEOUS EVERY 5 MIN PRN
Status: DISCONTINUED | OUTPATIENT
Start: 2025-05-22 | End: 2025-05-22 | Stop reason: HOSPADM

## 2025-05-22 RX ADMIN — PROCHLORPERAZINE EDISYLATE 5 MG: 5 INJECTION INTRAMUSCULAR; INTRAVENOUS at 10:46

## 2025-05-22 RX ADMIN — Medication 100 MCG: at 09:10

## 2025-05-22 RX ADMIN — ROCURONIUM BROMIDE 30 MG: 10 INJECTION, SOLUTION INTRAVENOUS at 08:17

## 2025-05-22 RX ADMIN — DEXAMETHASONE SODIUM PHOSPHATE 4 MG: 4 INJECTION INTRA-ARTICULAR; INTRALESIONAL; INTRAMUSCULAR; INTRAVENOUS; SOFT TISSUE at 08:02

## 2025-05-22 RX ADMIN — OXYCODONE 10 MG: 5 TABLET ORAL at 21:10

## 2025-05-22 RX ADMIN — Medication 120 MG: at 07:33

## 2025-05-22 RX ADMIN — HYDROMORPHONE HYDROCHLORIDE 0.5 MG: 1 INJECTION, SOLUTION INTRAMUSCULAR; INTRAVENOUS; SUBCUTANEOUS at 10:40

## 2025-05-22 RX ADMIN — ROCURONIUM BROMIDE 50 MG: 10 INJECTION, SOLUTION INTRAVENOUS at 07:51

## 2025-05-22 RX ADMIN — METHOCARBAMOL 1000 MG: 100 INJECTION INTRAMUSCULAR; INTRAVENOUS at 14:34

## 2025-05-22 RX ADMIN — ACETAMINOPHEN 650 MG: 325 TABLET ORAL at 14:34

## 2025-05-22 RX ADMIN — HYDROMORPHONE HYDROCHLORIDE 0.5 MG: 1 INJECTION, SOLUTION INTRAMUSCULAR; INTRAVENOUS; SUBCUTANEOUS at 09:45

## 2025-05-22 RX ADMIN — METHOCARBAMOL 1000 MG: 100 INJECTION, SOLUTION INTRAMUSCULAR; INTRAVENOUS at 09:10

## 2025-05-22 RX ADMIN — ACETAMINOPHEN 650 MG: 325 TABLET ORAL at 21:10

## 2025-05-22 RX ADMIN — MIDAZOLAM HYDROCHLORIDE 2 MG: 1 INJECTION, SOLUTION INTRAMUSCULAR; INTRAVENOUS at 07:28

## 2025-05-22 RX ADMIN — SODIUM CHLORIDE: 0.9 INJECTION, SOLUTION INTRAVENOUS at 12:47

## 2025-05-22 RX ADMIN — PROPOFOL 120 MG: 10 INJECTION, EMULSION INTRAVENOUS at 07:33

## 2025-05-22 RX ADMIN — ONDANSETRON 4 MG: 2 INJECTION INTRAMUSCULAR; INTRAVENOUS at 08:12

## 2025-05-22 RX ADMIN — Medication 20 MG: at 07:51

## 2025-05-22 RX ADMIN — Medication 100 MCG: at 09:33

## 2025-05-22 RX ADMIN — GLYCOPYRROLATE 0.2 MG: 0.2 INJECTION INTRAMUSCULAR; INTRAVENOUS at 07:29

## 2025-05-22 RX ADMIN — HYDROMORPHONE HYDROCHLORIDE 0.5 MG: 1 INJECTION, SOLUTION INTRAMUSCULAR; INTRAVENOUS; SUBCUTANEOUS at 09:14

## 2025-05-22 RX ADMIN — SODIUM CHLORIDE: 0.9 INJECTION, SOLUTION INTRAVENOUS at 21:10

## 2025-05-22 RX ADMIN — Medication 100 MCG: at 08:27

## 2025-05-22 RX ADMIN — Medication 100 MCG: at 08:25

## 2025-05-22 RX ADMIN — WATER 2000 MG: 1 INJECTION INTRAMUSCULAR; INTRAVENOUS; SUBCUTANEOUS at 07:43

## 2025-05-22 RX ADMIN — METHOCARBAMOL 1000 MG: 100 INJECTION INTRAMUSCULAR; INTRAVENOUS at 21:10

## 2025-05-22 RX ADMIN — OXYCODONE 10 MG: 5 TABLET ORAL at 15:34

## 2025-05-22 RX ADMIN — MORPHINE SULFATE 2 MG: 2 INJECTION, SOLUTION INTRAMUSCULAR; INTRAVENOUS at 16:58

## 2025-05-22 RX ADMIN — FENTANYL CITRATE 50 MCG: 50 INJECTION, SOLUTION INTRAMUSCULAR; INTRAVENOUS at 07:51

## 2025-05-22 RX ADMIN — FENTANYL CITRATE 50 MCG: 50 INJECTION, SOLUTION INTRAMUSCULAR; INTRAVENOUS at 07:33

## 2025-05-22 RX ADMIN — SUGAMMADEX 200 MG: 100 INJECTION, SOLUTION INTRAVENOUS at 09:28

## 2025-05-22 RX ADMIN — SODIUM CHLORIDE, SODIUM LACTATE, POTASSIUM CHLORIDE, AND CALCIUM CHLORIDE: .6; .31; .03; .02 INJECTION, SOLUTION INTRAVENOUS at 06:36

## 2025-05-22 RX ADMIN — HYDROMORPHONE HYDROCHLORIDE 0.5 MG: 1 INJECTION, SOLUTION INTRAMUSCULAR; INTRAVENOUS; SUBCUTANEOUS at 10:45

## 2025-05-22 RX ADMIN — Medication 100 MCG: at 09:54

## 2025-05-22 RX ADMIN — WATER 2000 MG: 1 INJECTION INTRAMUSCULAR; INTRAVENOUS; SUBCUTANEOUS at 15:34

## 2025-05-22 RX ADMIN — GLYCOPYRROLATE 0.2 MG: 0.2 INJECTION INTRAMUSCULAR; INTRAVENOUS at 09:07

## 2025-05-22 RX ADMIN — LIDOCAINE HYDROCHLORIDE 100 MG: 20 INJECTION, SOLUTION INTRAVENOUS at 07:33

## 2025-05-22 RX ADMIN — DEXAMETHASONE SODIUM PHOSPHATE 4 MG: 4 INJECTION INTRA-ARTICULAR; INTRALESIONAL; INTRAMUSCULAR; INTRAVENOUS; SOFT TISSUE at 08:12

## 2025-05-22 ASSESSMENT — PAIN DESCRIPTION - ORIENTATION
ORIENTATION: POSTERIOR;LOWER;ANTERIOR;MID
ORIENTATION: POSTERIOR;ANTERIOR;LOWER
ORIENTATION: LOWER

## 2025-05-22 ASSESSMENT — PAIN DESCRIPTION - FREQUENCY: FREQUENCY: CONTINUOUS

## 2025-05-22 ASSESSMENT — PAIN SCALES - GENERAL
PAINLEVEL_OUTOF10: 2
PAINLEVEL_OUTOF10: 7
PAINLEVEL_OUTOF10: 0
PAINLEVEL_OUTOF10: 4
PAINLEVEL_OUTOF10: 5
PAINLEVEL_OUTOF10: 6
PAINLEVEL_OUTOF10: 7
PAINLEVEL_OUTOF10: 3
PAINLEVEL_OUTOF10: 0

## 2025-05-22 ASSESSMENT — PAIN DESCRIPTION - DESCRIPTORS
DESCRIPTORS: ACHING;SHARP;THROBBING
DESCRIPTORS: ACHING;THROBBING;STABBING
DESCRIPTORS: ACHING

## 2025-05-22 ASSESSMENT — PAIN - FUNCTIONAL ASSESSMENT
PAIN_FUNCTIONAL_ASSESSMENT: ACTIVITIES ARE NOT PREVENTED
PAIN_FUNCTIONAL_ASSESSMENT: 0-10

## 2025-05-22 ASSESSMENT — PAIN DESCRIPTION - PAIN TYPE: TYPE: SURGICAL PAIN

## 2025-05-22 ASSESSMENT — PAIN DESCRIPTION - LOCATION
LOCATION: BACK;ABDOMEN
LOCATION: BACK;ABDOMEN
LOCATION: BACK

## 2025-05-22 ASSESSMENT — PAIN DESCRIPTION - ONSET: ONSET: ON-GOING

## 2025-05-22 NOTE — ANESTHESIA POSTPROCEDURE EVALUATION
Department of Anesthesiology  Postprocedure Note    Patient: Maryan Yates  MRN: [id number]  YOB: 1970  Date of evaluation: 5/22/2025    Procedure Summary       Date: 05/22/25 Room / Location: [address]    Anesthesia Start: 0728 Anesthesia Stop: 1028    Procedures:       LUMBAR 4 - SACRAL 1 ANTERIOR LUMBAR INTERBODY FUSION WITH PEDICLE SCREW FIXATION (Spine Lumbar)      . (Spine Lumbar) Diagnosis:       Lumbar spondylolysis      Lumbar radiculopathy      (Lumbar spondylolysis [M43.06])      (Lumbar radiculopathy [M54.16])    Surgeons: Mark Anthony Waller MD Responsible Provider: Sascha Hopson MD    Anesthesia Type: general ASA Status: 2            Anesthesia Type: No value filed.    Suraj Phase I: Suraj Score: 5    Suraj Phase II:      Anesthesia Post Evaluation    Patient location during evaluation: PACU  Patient participation: complete - patient participated  Level of consciousness: awake  Airway patency: patent  Nausea & Vomiting: no nausea and no vomiting  Cardiovascular status: blood pressure returned to baseline and hemodynamically stable  Respiratory status: acceptable  Hydration status: euvolemic  Multimodal analgesia pain management approach  Pain management: adequate    No notable events documented.

## 2025-05-22 NOTE — PROGRESS NOTES
Patient arrived from OR to PACU # 3 s/p LUMBAR 4 - SACRAL 1 ANTERIOR LUMBAR INTERBODY FUSION WITH PEDICLE SCREW FIXATION (Spine Lumbar) per Christopher /. Attached to PACU monitoring device report received from CRNA who stated no problems intraoperatively. Patient arrived sedated from anesthesia has history of prolonged emergence.

## 2025-05-22 NOTE — PROGRESS NOTES
Pt is A/Ox4, VSS on 1L O2, and hasn't been up yet post op (pt declined at this time). Pt is voiding via hess, tolerating food and fluids, and pain is being managed with scheduled and PRN pain meds per MAR. All safety precautions in place, call light within reach, plan of care continues.

## 2025-05-22 NOTE — OP NOTE
Operative Note      Patient: Maryan Yates  YOB: 1970  MRN: [id number]    Date of Procedure: 5/22/2025    Pre-Op Diagnosis Codes:      * Lumbar spondylolysis [M43.06]     * Lumbar radiculopathy [M54.16]    Post-Op Diagnosis: Same       Procedure(s):  LUMBAR 4 - SACRAL 1 ANTERIOR LUMBAR INTERBODY FUSION WITH PEDICLE SCREW FIXATION, POSSIBLE FACETECTOMY  .    Surgeon(s):  Mark Anthony Waller MD Kilaru, Sasidhar P, MD    Assistant:   Surgical Assistant: Ramon Roper  Physician Assistant: June Steven PA    Anesthesia: General    Estimated Blood Loss (mL): Minimal    Complications: None    Specimens:   * No specimens in log *    Implants:  * No implants in log *      Drains:   Urinary Catheter 05/22/25 Ortiz (Active)       Findings:  Infection Present At Time Of Surgery (PATOS) (choose all levels that have infection present):  No infection present  Other Findings:     Detailed Description of Procedure:   The patient is a 54-year-old female with  longstanding history of chronic back and leg pain.  She was evaluated by  Dr. Waller and was felt to require anterior fusion of the L4-L5 and  L5-S1 disk spaces.  I met the patient preoperatively and had an  extensive discussion with her regarding the risks related to exposure.  Risks discussed included bleeding, infection, the possibility of bowel,  bladder, or ureteral injuries; possibility of nerve damage,   paresthesias; hernias or lymph leaks; the possibility of arterial or  venous thrombosis requiring thrombectomy or bypass, and the possibility  of retroperitoneal fluid collection requiring drainage were all  extensively discussed.  The patient understood these risks and wished to  proceed.      PROCEDURE IN DETAIL:  The patient was taken to the operating room,  placed on the operating table in supine position.  General endotracheal  anesthesia was induced.  IV antibiotics were administered and Ortiz  catheter was placed.  Preoperative

## 2025-05-22 NOTE — PROGRESS NOTES
4 Eyes Skin Assessment     NAME:  Maryan Yates  YOB: 1970  MEDICAL RECORD NUMBER:  [id number]    The patient is being assessed for  Post-Op Surgical    I agree that at least one RN has performed a thorough Head to Toe Skin Assessment on the patient. ALL assessment sites listed below have been assessed.      Areas assessed by both nurses:    Head, Face, Ears, Shoulders, Back, Chest, Arms, Elbows, Hands, Sacrum. Buttock, Coccyx, Ischium, Legs. Feet and Heels, and Under Medical Devices         Does the Patient have a Wound? No noted wound(s)  - x1 inc. To abdomen   - x2 inc. To back        Neftaly Prevention initiated by RN: No  Wound Care Orders initiated by RN: No    Pressure Injury (Stage 3,4, Unstageable, DTI, NWPT, and Complex wounds) if present, place Wound referral order by RN under : No    New Ostomies, if present place, Ostomy referral order under : No     Nurse 1 eSignature: Electronically signed by Leydi Gillespie RN on 5/22/25 at 2:23 PM EDT    **SHARE this note so that the co-signing nurse can place an eSignature**    Nurse 2 eSignature: Electronically signed by Joyce Faye RN on 5/22/25 at 3:04 PM EDT

## 2025-05-22 NOTE — PROGRESS NOTES
PACU Transfer to Floor Note    Procedure(s):  LUMBAR 4 - SACRAL 1 ANTERIOR LUMBAR INTERBODY FUSION WITH PEDICLE SCREW FIXATION  .    Current Allergies: Sulfa antibiotics, Codeine, and Pork-derived products    Pt meets criteria as per Leta Score and ASPAN Standards to transfer to next phase of care.     No results for input(s): \"POCGLU\" in the last 72 hours.    Vitals:    05/22/25 1245   BP: 108/66   Pulse: 59   Resp: 11   Temp: 97.5 °F (36.4 °C)   SpO2: 96%     Vitals within 20% of pt's admission vitals as per LETA SCORE    SpO2: 96 %    O2 Flow Rate (L/min): 2 L/min      Intake/Output Summary (Last 24 hours) at 5/22/2025 1259  Last data filed at 5/22/2025 1009  Gross per 24 hour   Intake 1000 ml   Output 105 ml   Net 895 ml       Pain assessment:  receiving treatment    Pain Level: 4    Patient was assessed for alterations to skin integrity. There were not alterations observed.    Is patient incontinent: no    Handoff report given at bedside.   Son called and updated with room # 1323 and directed to pt room      5/22/2025 12:59 PM

## 2025-05-22 NOTE — INTERVAL H&P NOTE
Update History & Physical    The patient's History and Physical of May 6, 2025 was reviewed with the patient and I examined the patient. There was no change. The surgical site was confirmed by the patient and me.     Plan: The risks, benefits, expected outcome, and alternative to the recommended procedure have been discussed with the patient. Patient understands and wants to proceed with the procedure.     Electronically signed by MIRELA Winston on 5/22/2025 at 8:52 AM

## 2025-05-22 NOTE — ANESTHESIA PRE PROCEDURE
Department of Anesthesiology  Preprocedure Note       Name:  Maryan Yates   Age:  54 y.o.  :  1970                                          MRN:  [id number]         Date:  2025      Surgeon: Surgeon(s):  Mark Anthony Waller MD Kilaru, Sasidhar P, MD    Procedure: Procedure(s):  LUMBAR 4 - SACRAL 1 ANTERIOR LUMBAR INTERBODY FUSION WITH PEDICLE SCREW FIXATION, POSSIBLE FACETECTOMY  .    Medications prior to admission:   Prior to Admission medications    Medication Sig Start Date End Date Taking? Authorizing Provider   Calcium Carbonate (CALCI-CHEW PO) Take 2 tablets by mouth daily    Malvin May MD   Multiple Vitamin (MULTIVITAMIN, BARIATRIC FUSION COMPLETE, CHEW TAB) Take 2 tablets by mouth daily    Malvin May MD   esomeprazole Magnesium (NEXIUM) 20 MG PACK Take 1 packet by mouth daily  Patient not taking: Reported on 2025  ProviderMalvin MD       Current medications:    Current Facility-Administered Medications   Medication Dose Route Frequency Provider Last Rate Last Admin   • ceFAZolin (ANCEF) 2,000 mg in sterile water 20 mL IV syringe  2,000 mg IntraVENous Once Mark Anthony Waller MD       • lidocaine PF 1 % injection 1 mL  1 mL IntraDERmal Once PRN Tab Rosa MD       • lactated ringers infusion   IntraVENous Continuous Tab Rosa MD       • sodium chloride flush 0.9 % injection 5-40 mL  5-40 mL IntraVENous 2 times per day Tab Rosa MD       • sodium chloride flush 0.9 % injection 5-40 mL  5-40 mL IntraVENous PRN Tab Rosa MD           Allergies:    Allergies   Allergen Reactions   • Sulfa Antibiotics      vomiting   • Codeine Other (See Comments)     vomiting   • Pork-Derived Products Hives, Diarrhea and Itching     Vomiting.       Problem List:    Patient Active Problem List   Diagnosis Code   • Family history of breast cancer in mother Z80.3   • Atrophic kidney N26.1   • Primary insomnia F51.01   • Subclinical

## 2025-05-22 NOTE — PROGRESS NOTES
Patient admitted to room 5510. Report received from RN at bedside. VSS on 2L. Patient is a/o x4. Patient oriented to room and call light. Rights and responsibilities provided to patient, and welcome packet provided to patient. 4 eyes skin assessment completed with 2nd RN.

## 2025-05-22 NOTE — PROGRESS NOTES
Consult received.  Discussed with bedside RN.  Postoperatively patient had episodes of asymptomatic bradycardia while she was sleeping.  Denied any chest pain, dizziness or shortness of breath.  Patient hemodynamically stable.  Not receiving any AV gricelda blocking agents.  Telemetry order placed.  Full note after seeing the patient.    Hudson Temple MD  [facility]  [facility]

## 2025-05-23 LAB
ANION GAP SERPL CALCULATED.3IONS-SCNC: 7 MMOL/L (ref 3–16)
BUN SERPL-MCNC: 13 MG/DL (ref 7–20)
CALCIUM SERPL-MCNC: 8.4 MG/DL (ref 8.3–10.6)
CHLORIDE SERPL-SCNC: 107 MMOL/L (ref 99–110)
CO2 SERPL-SCNC: 25 MMOL/L (ref 21–32)
CREAT SERPL-MCNC: 0.7 MG/DL (ref 0.6–1.1)
DEPRECATED RDW RBC AUTO: 13.6 % (ref 12.4–15.4)
GFR SERPLBLD CREATININE-BSD FMLA CKD-EPI: >90 ML/MIN/{1.73_M2}
GLUCOSE SERPL-MCNC: 99 MG/DL (ref 70–99)
HCT VFR BLD AUTO: 36.2 % (ref 36–48)
HGB BLD-MCNC: 12.2 G/DL (ref 12–16)
MCH RBC QN AUTO: 29.5 PG (ref 26–34)
MCHC RBC AUTO-ENTMCNC: 33.8 G/DL (ref 31–36)
MCV RBC AUTO: 87.4 FL (ref 80–100)
PLATELET # BLD AUTO: 170 K/UL (ref 135–450)
PMV BLD AUTO: 8.6 FL (ref 5–10.5)
POTASSIUM SERPL-SCNC: 3.7 MMOL/L (ref 3.5–5.1)
RBC # BLD AUTO: 4.15 M/UL (ref 4–5.2)
SODIUM SERPL-SCNC: 139 MMOL/L (ref 136–145)
WBC # BLD AUTO: 7.8 K/UL (ref 4–11)

## 2025-05-23 PROCEDURE — 6370000000 HC RX 637 (ALT 250 FOR IP)

## 2025-05-23 PROCEDURE — 97116 GAIT TRAINING THERAPY: CPT

## 2025-05-23 PROCEDURE — 2580000003 HC RX 258: Performed by: NURSE PRACTITIONER

## 2025-05-23 PROCEDURE — 6360000002 HC RX W HCPCS: Performed by: PHYSICIAN ASSISTANT

## 2025-05-23 PROCEDURE — 97162 PT EVAL MOD COMPLEX 30 MIN: CPT

## 2025-05-23 PROCEDURE — 80048 BASIC METABOLIC PNL TOTAL CA: CPT

## 2025-05-23 PROCEDURE — 97530 THERAPEUTIC ACTIVITIES: CPT

## 2025-05-23 PROCEDURE — 6370000000 HC RX 637 (ALT 250 FOR IP): Performed by: PHYSICIAN ASSISTANT

## 2025-05-23 PROCEDURE — 2500000003 HC RX 250 WO HCPCS: Performed by: PHYSICIAN ASSISTANT

## 2025-05-23 PROCEDURE — 36415 COLL VENOUS BLD VENIPUNCTURE: CPT

## 2025-05-23 PROCEDURE — 97166 OT EVAL MOD COMPLEX 45 MIN: CPT

## 2025-05-23 PROCEDURE — 85027 COMPLETE CBC AUTOMATED: CPT

## 2025-05-23 PROCEDURE — 1200000000 HC SEMI PRIVATE

## 2025-05-23 PROCEDURE — 97535 SELF CARE MNGMENT TRAINING: CPT

## 2025-05-23 RX ORDER — SENNA AND DOCUSATE SODIUM 50; 8.6 MG/1; MG/1
2 TABLET, FILM COATED ORAL 2 TIMES DAILY
Status: DISCONTINUED | OUTPATIENT
Start: 2025-05-23 | End: 2025-05-24 | Stop reason: HOSPADM

## 2025-05-23 RX ORDER — PANTOPRAZOLE SODIUM 40 MG/1
40 TABLET, DELAYED RELEASE ORAL
Status: DISCONTINUED | OUTPATIENT
Start: 2025-05-24 | End: 2025-05-24 | Stop reason: HOSPADM

## 2025-05-23 RX ORDER — POLYETHYLENE GLYCOL 3350 17 G/17G
17 POWDER, FOR SOLUTION ORAL DAILY
Status: DISCONTINUED | OUTPATIENT
Start: 2025-05-23 | End: 2025-05-24 | Stop reason: HOSPADM

## 2025-05-23 RX ORDER — SODIUM CHLORIDE 9 MG/ML
INJECTION, SOLUTION INTRAVENOUS CONTINUOUS
Status: ACTIVE | OUTPATIENT
Start: 2025-05-23 | End: 2025-05-24

## 2025-05-23 RX ADMIN — OXYCODONE 5 MG: 5 TABLET ORAL at 08:10

## 2025-05-23 RX ADMIN — POLYETHYLENE GLYCOL 3350 17 G: 17 POWDER, FOR SOLUTION ORAL at 15:31

## 2025-05-23 RX ADMIN — DOCUSATE SODIUM 50 MG AND SENNOSIDES 8.6 MG 2 TABLET: 8.6; 5 TABLET, FILM COATED ORAL at 19:51

## 2025-05-23 RX ADMIN — METHOCARBAMOL 1000 MG: 100 INJECTION INTRAMUSCULAR; INTRAVENOUS at 05:19

## 2025-05-23 RX ADMIN — WATER 2000 MG: 1 INJECTION INTRAMUSCULAR; INTRAVENOUS; SUBCUTANEOUS at 01:15

## 2025-05-23 RX ADMIN — DOCUSATE SODIUM 50 MG AND SENNOSIDES 8.6 MG 2 TABLET: 8.6; 5 TABLET, FILM COATED ORAL at 15:32

## 2025-05-23 RX ADMIN — SODIUM CHLORIDE: 0.9 INJECTION, SOLUTION INTRAVENOUS at 15:39

## 2025-05-23 RX ADMIN — SODIUM CHLORIDE, PRESERVATIVE FREE 10 ML: 5 INJECTION INTRAVENOUS at 19:52

## 2025-05-23 RX ADMIN — OXYCODONE 5 MG: 5 TABLET ORAL at 01:17

## 2025-05-23 RX ADMIN — OXYCODONE 10 MG: 5 TABLET ORAL at 12:33

## 2025-05-23 RX ADMIN — ACETAMINOPHEN 650 MG: 325 TABLET ORAL at 19:51

## 2025-05-23 RX ADMIN — ACETAMINOPHEN 650 MG: 325 TABLET ORAL at 01:16

## 2025-05-23 RX ADMIN — OXYCODONE 10 MG: 5 TABLET ORAL at 19:50

## 2025-05-23 RX ADMIN — ENOXAPARIN SODIUM 30 MG: 100 INJECTION SUBCUTANEOUS at 08:10

## 2025-05-23 RX ADMIN — MORPHINE SULFATE 2 MG: 2 INJECTION, SOLUTION INTRAMUSCULAR; INTRAVENOUS at 15:32

## 2025-05-23 RX ADMIN — ACETAMINOPHEN 650 MG: 325 TABLET ORAL at 12:34

## 2025-05-23 RX ADMIN — ACETAMINOPHEN 650 MG: 325 TABLET ORAL at 08:10

## 2025-05-23 RX ADMIN — SODIUM CHLORIDE, PRESERVATIVE FREE 10 ML: 5 INJECTION INTRAVENOUS at 08:12

## 2025-05-23 RX ADMIN — ENOXAPARIN SODIUM 30 MG: 100 INJECTION SUBCUTANEOUS at 19:57

## 2025-05-23 RX ADMIN — METHOCARBAMOL 1000 MG: 100 INJECTION INTRAMUSCULAR; INTRAVENOUS at 19:53

## 2025-05-23 ASSESSMENT — PAIN DESCRIPTION - FREQUENCY
FREQUENCY: CONTINUOUS
FREQUENCY: CONTINUOUS

## 2025-05-23 ASSESSMENT — PAIN DESCRIPTION - LOCATION
LOCATION: BACK

## 2025-05-23 ASSESSMENT — PAIN DESCRIPTION - ORIENTATION
ORIENTATION: LOWER;MID;POSTERIOR
ORIENTATION: LOWER;MID;POSTERIOR
ORIENTATION: LOWER;MID
ORIENTATION: LOWER
ORIENTATION: MID;LOWER;POSTERIOR

## 2025-05-23 ASSESSMENT — PAIN - FUNCTIONAL ASSESSMENT
PAIN_FUNCTIONAL_ASSESSMENT: ACTIVITIES ARE NOT PREVENTED

## 2025-05-23 ASSESSMENT — PAIN SCALES - GENERAL
PAINLEVEL_OUTOF10: 2
PAINLEVEL_OUTOF10: 2
PAINLEVEL_OUTOF10: 3
PAINLEVEL_OUTOF10: 7
PAINLEVEL_OUTOF10: 8
PAINLEVEL_OUTOF10: 7
PAINLEVEL_OUTOF10: 4
PAINLEVEL_OUTOF10: 6
PAINLEVEL_OUTOF10: 4
PAINLEVEL_OUTOF10: 4

## 2025-05-23 ASSESSMENT — PAIN DESCRIPTION - PAIN TYPE
TYPE: SURGICAL PAIN
TYPE: SURGICAL PAIN

## 2025-05-23 ASSESSMENT — PAIN DESCRIPTION - ONSET
ONSET: ON-GOING
ONSET: ON-GOING

## 2025-05-23 ASSESSMENT — PAIN DESCRIPTION - DESCRIPTORS
DESCRIPTORS: ACHING;STABBING
DESCRIPTORS: ACHING;SHARP
DESCRIPTORS: ACHING;SHOOTING
DESCRIPTORS: ACHING
DESCRIPTORS: ACHING;SHARP

## 2025-05-23 NOTE — PROGRESS NOTES
Physical Therapy  Facility/Department: [facility] ORTHO/NEURO  Physical Therapy Initial Assessment    Name: Maryan Yates  : 1970  MRN: [id number]  Date of Service: 2025    Discharge Recommendations:  24 hour supervision or assist, Home with Home health PT   PT Equipment Recommendations  Equipment Needed: Yes  Mobility Devices: Walker  Walker: Rolling      Patient Diagnosis(es): There were no encounter diagnoses.  Past Medical History:  has a past medical history of Acid reflux, Anesthesia, Brain tumor (benign) (HCC), Chronic GERD, Kidney anomaly, congenital, Kidney function abnormal, Kidney stone, Liver spot, Meningioma (HCC), Prolonged emergence from general anesthesia, Thyroid disease, Toxic nodular goiter, and Wears glasses.  Past Surgical History:  has a past surgical history that includes Partial hysterectomy;  section; back surgery; Tubal ligation; Hysterectomy (07/15/2011); Cholecystectomy (2018); Upper gastrointestinal endoscopy (N/A, 2022); Sleeve Gastrectomy (N/A, 2022); hiatal hernia repair (N/A, 2022); Breast biopsy (Left, 2023); Cystoscopy (Left, 2024); and lumbar fusion (N/A, 2025).    Assessment  Body Structures, Functions, Activity Limitations Requiring Skilled Therapeutic Intervention: Decreased functional mobility ;Decreased endurance;Decreased balance;Increased pain  Assessment: Pt is 54 y.o. s/p LUMBAR 4 - SACRAL 1 ANTERIOR LUMBAR INTERBODY FUSION WITH PEDICLE SCREW FIXATION presenting with decreased functional mobility.  Pt is currently requiring physical assist for all OOB mobility, which is a decline from reported baseline.  Anticipate good progress throughout IP stay.  Pt will benefit from skilled therapy to maximize safety and independence.  PT recommends 24 hr supervision/assist with use of RW and HHPT for increased safety.  Treatment Diagnosis: decreased functional mobility s/p LUMBAR 4 - SACRAL 1 ANTERIOR LUMBAR INTERBODY FUSION WITH  Goal 3: Pt will ambulate 150' with RW and supervision  Short Term Goal 4: Pt will ascend/descend 14 stairs with unilateral HR and CGA  Short Term Goal 5: Pt will ascend/descend 2 stairs with no HR and Zack  Patient Goals   Patient Goals : \"To be able to move.\"       Education  Patient Education  Education Given To: Patient  Education Provided: Role of Therapy;Plan of Care  Education Method: Verbal  Barriers to Learning: None  Education Outcome: Verbalized understanding      Therapy Time   Individual Concurrent Group Co-treatment   Time In 0857         Time Out 0944         Minutes 47             Timed Code Treatment Minutes:   32    Total Treatment Minutes:  47      Jeana Villarreal PT     This note to serve as discharge summary if patient discharged before next session.

## 2025-05-23 NOTE — PROGRESS NOTES
NEUROSURGERY POST-OP PROGRESS NOTE    Patient Name: Maryan Yates YOB: 1970   Sex: Female Age: 54 yrs     Medical Record Number: [id number] Acct Number: [id number]   Room Number: 5510/5510-01 Hospital Day: Hospital Day: 2     Interval History:  Post-operative Day# 1 s/p Procedure(s) (LRB):  LUMBAR 4 - SACRAL 1 ANTERIOR LUMBAR INTERBODY FUSION WITH PEDICLE SCREW FIXATION (N/A)  . (N/A)    Subjective: Patient resting in bed. Incisional pain is controlled with current regimen. Pre-operative radicular leg pain is resolved post-op. Reports light-headedness while working with therapy today.    Objective:    VITAL SIGNS   BP (!) 96/57   Pulse 81   Temp 98.4 °F (36.9 °C) (Oral)   Resp 16   Ht 1.702 m (5' 7\")   Wt 102.8 kg (226 lb 9.6 oz)   LMP 06/18/2011   SpO2 95%   BMI 35.49 kg/m²    Height Height: 170.2 cm (5' 7\")   Weight Weight - Scale: 102.8 kg (226 lb 9.6 oz)        Allergies Allergies   Allergen Reactions    Sulfa Antibiotics      vomiting    Codeine Other (See Comments)     vomiting    Pork-Derived Products Hives, Diarrhea and Itching     Vomiting.      NPO Status ADULT DIET; Regular   Isolation No active isolations     LABS   Basic Metabolic Profile Recent Labs     05/23/25  0522         CO2 25   BUN 13   CREATININE 0.7   GLUCOSE 99      Complete Blood Count Recent Labs     05/23/25  0522   WBC 7.8   RBC 4.15      Coagulation Studies No results for input(s): \"INR\" in the last 72 hours.    Invalid input(s): \"PLATELETS\", \"PROA\", \"PT\", \"PTTA\", \"PTT\"     MEDICATIONS   Inpatient Medications     sodium chloride flush, 5-40 mL, IntraVENous, 2 times per day    acetaminophen, 650 mg, Oral, Q6H    enoxaparin, 30 mg, SubCUTAneous, BID    methocarbamol (ROBAXIN) IVPB, 1,000 mg, IntraVENous, Q8H   Infusions    lactated ringers  Stopped (05/22/25 0918)    sodium chloride 125 mL/hr at 05/22/25 2110    sodium chloride        Antibiotics   Recent Abx Admin                     ceFAZolin (ANCEF) 2,000 mg in sterile water 20 mL IV syringe (mg) 2,000 mg Given 05/23/25 0115     2,000 mg Given 05/22/25 1534                     Neurologic Exam:  Mental status: awake and alert and oriented x4    Musculoskeletal:   Gait: Not tested   Tone: normal  Sensory: intact to all extremities  Motor strength:    Right  Left    Right  Left    Deltoid  5 5  Hip Flex  5 5   Biceps  5 5  Knee Extensors  5 5   Triceps  5 5  Knee Flexors  5 5   Wrist Ext  5 5  Ankle Dorsiflex.  5 5   Wrist Flex  5 5  Ankle Plantarflex.  5 5   Handgrip  5 5  Ext Rober Longus  5 5   Thumb Ext  5 5         Anterior Incision: Dermabond TANYA, CDI  Posterior bilateral incisions: Dermabond TANYA, CDI    Respiratory:  Unlabored respiratory pattern    Abdomen:   Soft, ND   Last Bm pre-op    Cardiovascular:  Warm, well perfused    Assessment   Patient is a 55 yo female s/p Procedure(s) (LRB):  LUMBAR 4 - SACRAL 1 ANTERIOR LUMBAR INTERBODY FUSION WITH PEDICLE SCREW FIXATION (N/A)  . (N/A) per Dr. Payton    Plan:  Neurologic exam frequency: Q4H  Mobility: Activity as tolerated, PT/OT  Diet: Regular  Antibiotics: post-op Ancef completed  Continue IVF today due to soft BPs  Ortiz: Removed  DVT Prophylaxis: SCDs and SQ Lovenox  GI Prophylaxis: Protonix  Bowel Regimen: Senna-s, glycolax  Pain control: Tylenol, Roxicodone, Morphine  Ice pack to incision(s) for 20 minutes every 2 hours while awake  Scheduled robaxin and prn valium for spasm  Incisional Care: Open to air and wash daily with soap and water  Dispo Planning: Continue care on 5T. Anticipate discharge tomorrow pending pain control, bp     Patient was discussed with Dr. Payton who agrees with above assessment and plan.     Electronically signed by: Pooja Weeks PA-C, 5/23/2025 11:28 AM   Neurosurgery [facility]  [phone]

## 2025-05-23 NOTE — CARE COORDINATION
Case Management Assessment  Initial Evaluation    Date/Time of Evaluation: 5/23/2025 4:16 PM  Assessment Completed by: Kina Pemberton RN    If patient is discharged prior to next notation, then this note serves as note for discharge by case management.    Patient Name: Maryan Yates                   YOB: 1970  Diagnosis: Lumbar spondylolysis [M43.06]  Lumbar radiculopathy [M54.16]  Spinal stenosis, lumbar region with neurogenic claudication [M48.062]                   Date / Time: 5/22/2025  5:26 AM    Patient Admission Status: Inpatient   Readmission Risk (Low < 19, Mod (19-27), High > 27): Readmission Risk Score: 7    Current PCP: Claudine Pandey APRN - CNP  PCP verified by CM? Yes    Chart Reviewed: Yes      History Provided by: Patient  Patient Orientation: Alert and Oriented, Place, Person, Situation, Self    Patient Cognition: Alert    Hospitalization in the last 30 days (Readmission):  No    If yes, Readmission Assessment in CM Navigator will be completed.    Advance Directives:      Code Status: Full Code   Patient's Primary Decision Maker is: Legal Next of Kin      Discharge Planning:    Patient lives with: Children, Family Members Type of Home: House  Primary Care Giver: Self  Patient Support Systems include: Children   Current Financial resources: None  Current community resources: None  Current services prior to admission: None            Current DME:              Type of Home Care services:  None    ADLS  Prior functional level: Independent in ADLs/IADLs  Current functional level: Housework, Shopping, Mobility    PT AM-PAC: 18 /24  OT AM-PAC: 19 /24    Family can provide assistance at DC: Yes  Would you like Case Management to discuss the discharge plan with any other family members/significant others, and if so, who? No  Plans to Return to Present Housing: Yes  Other Identified Issues/Barriers to RETURNING to current housing: none  Potential Assistance needed at discharge: N/A

## 2025-05-23 NOTE — PROGRESS NOTES
Pt a/o x4. VSS on RA. Incisions CDI. Ortiz remains in place. Pain managed per MAR. Dangled at the bedside night or surgery and tolerated well. Fall precautions are in place.

## 2025-05-23 NOTE — PROGRESS NOTES
Pt is A/Ox4, VSS on RA (sometimes soft BP), and x1 walker/gb. Pt is voiding via bathroom privileges, tolerating food and fluids, and pain is being managed with scheduled and PRN pain meds per MAR. All safety precautions in place, call light within reach, plan of care continues.

## 2025-05-23 NOTE — PROGRESS NOTES
Occupational Therapy  Facility/Department: [facility] ORTHO/NEURO  Occupational Therapy Initial Assessment/Treatment    Name: Maryan Yates  : 1970  MRN: [id number]  Date of Service: 2025    Discharge Recommendations:  24 hour supervision or assist  OT Equipment Recommendations  Equipment Needed: Yes  Mobility Devices: ADL Assistive Devices  ADL Assistive Devices: Reacher;Long-handled Sponge  Other: Pt plans to shop [employer] for equipment       Patient Diagnosis(es): There were no encounter diagnoses.  Past Medical History:  has a past medical history of Acid reflux, Anesthesia, Brain tumor (benign) (HCC), Chronic GERD, Kidney anomaly, congenital, Kidney function abnormal, Kidney stone, Liver spot, Meningioma (HCC), Prolonged emergence from general anesthesia, Thyroid disease, Toxic nodular goiter, and Wears glasses.  Past Surgical History:  has a past surgical history that includes Partial hysterectomy;  section; back surgery; Tubal ligation; Hysterectomy (07/15/2011); Cholecystectomy (2018); Upper gastrointestinal endoscopy (N/A, 2022); Sleeve Gastrectomy (N/A, 2022); hiatal hernia repair (N/A, 2022); Breast biopsy (Left, 2023); Cystoscopy (Left, 2024); and lumbar fusion (N/A, 2025).    Treatment Diagnosis: Impaired ADL and functional mobility      Assessment  Performance deficits / Impairments: Decreased functional mobility ;Decreased ADL status;Decreased endurance  Assessment: Pt seen s/p LUMBAR 4 - SACRAL 1 ANTERIOR LUMBAR INTERBODY FUSION.  Pt with min c/o pain.  Pt req CG-SBA for functional mobility and ADL.  Anticipate home discharge with assist from family.  Treatment Diagnosis: Impaired ADL and functional mobility  Prognosis: Good  Decision Making: Medium Complexity  REQUIRES OT FOLLOW-UP: Yes  Activity Tolerance  Activity Tolerance: Patient limited by fatigue     Plan  Occupational Therapy Plan  Times Per Week: 5-7  Current Treatment Recommendations:  Balance training, Functional mobility training, Endurance training, Self-Care / ADL, Equipment evaluation, education, & procurement    Restrictions  Position Activity Restriction  Other Position/Activity Restrictions: Activity as tolerated, Ambulate patient, spinal precautions    Subjective  General  Chart Reviewed: Yes  Patient assessed for rehabilitation services?: Yes  Additional Pertinent Hx: Pt admitted 5/22/25 for procedure: LUMBAR 4 - SACRAL 1 ANTERIOR LUMBAR INTERBODY FUSION WITH PEDICLE SCREW FIXATION, POSSIBLE FACETECTOMY  Family / Caregiver Present: No  Referring Practitioner: MIRELA Boo  Diagnosis: Lumbar spondylolysis, Lumbar radiculopathy  Subjective  Subjective: Pt up in chair upon entry.  Pt agreeable to activity.  Pain: 3/10     Social/Functional History  Social/Functional History  Lives With: Son (daughter in law, pt's teenage daughter (15), and grandkids (4 and 6))  Type of Home: House  Home Layout: Two level, Bed/Bath upstairs (7 with RHR+ landing + 7 stairs with LHR)  Home Access: Stairs to enter without rails  Entrance Stairs - Number of Steps: 2 FAWN  Bathroom Shower/Tub: Tub/Shower unit  Bathroom Toilet: Standard (sink nearby)  Bathroom Equipment: Hand-held shower  Bathroom Accessibility: Walker accessible  Home Equipment: Adjustable bed  Has the patient had two or more falls in the past year or any fall with injury in the past year?: No  Prior Level of Assist for ADLs: Independent  Prior Level of Assist for Homemaking: Independent  Prior Level of Assist for Ambulation: Independent community ambulator, with or without device (without AD)  Prior Level of Assist for Transfers: Independent  Active : Yes  Occupation: Full time employment  Type of Occupation: high   Leisure & Hobbies: traveling  Additional Comments: Pt reports that her daughter in law works from home and her teenage daughter will provide 24 hr supervision/assist initially at

## 2025-05-23 NOTE — PLAN OF CARE
Problem: Discharge Planning  Goal: Discharge to home or other facility with appropriate resources  5/23/2025 0730 by Leydi Gillespie RN  Outcome: Progressing  Pt expected to work with PT/OT today to assess discharge plan and needs.      Problem: Pain  Goal: Verbalizes/displays adequate comfort level or baseline comfort level  5/23/2025 0730 by Leydi Gillespie RN  Outcome: Progressing  Pain is being managed with scheduled and PRN pain meds per MAR.      Problem: Safety - Adult  Goal: Free from fall injury  5/23/2025 0730 by Leydi Gillespie, RN  Outcome: Progressing   All fall precautions in place. Bed locked and in lowest position with alarm on. Overbed table and personal belonings within reach. Call light within reach and patient instructed to use call light for assistance. Non-skid socks on.

## 2025-05-23 NOTE — PLAN OF CARE
Seen and examined patient at bedside.  Vitals reviewed.    Reviewed patient's home medication list.  Patient without any longstanding comorbid conditions and is not on any long-term medications.  Will sign off.

## 2025-05-24 VITALS
SYSTOLIC BLOOD PRESSURE: 130 MMHG | DIASTOLIC BLOOD PRESSURE: 84 MMHG | TEMPERATURE: 97.7 F | RESPIRATION RATE: 16 BRPM | HEIGHT: 67 IN | OXYGEN SATURATION: 98 % | BODY MASS INDEX: 35.56 KG/M2 | WEIGHT: 226.6 LBS | HEART RATE: 84 BPM

## 2025-05-24 PROBLEM — Z98.1 S/P LUMBAR FUSION: Status: ACTIVE | Noted: 2025-05-22

## 2025-05-24 PROCEDURE — 2500000003 HC RX 250 WO HCPCS: Performed by: PHYSICIAN ASSISTANT

## 2025-05-24 PROCEDURE — 99024 POSTOP FOLLOW-UP VISIT: CPT | Performed by: NURSE PRACTITIONER

## 2025-05-24 PROCEDURE — 6370000000 HC RX 637 (ALT 250 FOR IP): Performed by: PHYSICIAN ASSISTANT

## 2025-05-24 PROCEDURE — 6360000002 HC RX W HCPCS: Performed by: PHYSICIAN ASSISTANT

## 2025-05-24 PROCEDURE — 6370000000 HC RX 637 (ALT 250 FOR IP)

## 2025-05-24 PROCEDURE — APPNB30 APP NON BILLABLE TIME 0-30 MINS: Performed by: NURSE PRACTITIONER

## 2025-05-24 RX ORDER — DIAZEPAM 5 MG/1
5 TABLET ORAL NIGHTLY PRN
Qty: 7 TABLET | Refills: 0 | Status: SHIPPED | OUTPATIENT
Start: 2025-05-24 | End: 2025-05-31

## 2025-05-24 RX ORDER — SENNA AND DOCUSATE SODIUM 50; 8.6 MG/1; MG/1
2 TABLET, FILM COATED ORAL 2 TIMES DAILY PRN
COMMUNITY
Start: 2025-05-24

## 2025-05-24 RX ORDER — OXYCODONE HYDROCHLORIDE 5 MG/1
5-10 TABLET ORAL EVERY 6 HOURS PRN
Qty: 42 TABLET | Refills: 0 | Status: SHIPPED | OUTPATIENT
Start: 2025-05-24 | End: 2025-05-31

## 2025-05-24 RX ORDER — METHOCARBAMOL 750 MG/1
750 TABLET, FILM COATED ORAL 4 TIMES DAILY
Qty: 40 TABLET | Refills: 0 | Status: SHIPPED | OUTPATIENT
Start: 2025-05-24 | End: 2025-06-03

## 2025-05-24 RX ADMIN — DOCUSATE SODIUM 50 MG AND SENNOSIDES 8.6 MG 2 TABLET: 8.6; 5 TABLET, FILM COATED ORAL at 08:53

## 2025-05-24 RX ADMIN — ACETAMINOPHEN 650 MG: 325 TABLET ORAL at 00:02

## 2025-05-24 RX ADMIN — METHOCARBAMOL 1000 MG: 100 INJECTION INTRAMUSCULAR; INTRAVENOUS at 14:23

## 2025-05-24 RX ADMIN — OXYCODONE 10 MG: 5 TABLET ORAL at 08:53

## 2025-05-24 RX ADMIN — METHOCARBAMOL 1000 MG: 100 INJECTION INTRAMUSCULAR; INTRAVENOUS at 05:31

## 2025-05-24 RX ADMIN — PANTOPRAZOLE SODIUM 40 MG: 40 TABLET, DELAYED RELEASE ORAL at 05:31

## 2025-05-24 RX ADMIN — SODIUM CHLORIDE, PRESERVATIVE FREE 10 ML: 5 INJECTION INTRAVENOUS at 08:55

## 2025-05-24 RX ADMIN — POLYETHYLENE GLYCOL 3350 17 G: 17 POWDER, FOR SOLUTION ORAL at 08:56

## 2025-05-24 RX ADMIN — ENOXAPARIN SODIUM 30 MG: 100 INJECTION SUBCUTANEOUS at 08:58

## 2025-05-24 RX ADMIN — DIAZEPAM 5 MG: 5 TABLET ORAL at 00:04

## 2025-05-24 RX ADMIN — ACETAMINOPHEN 650 MG: 325 TABLET ORAL at 14:24

## 2025-05-24 RX ADMIN — OXYCODONE 10 MG: 5 TABLET ORAL at 00:03

## 2025-05-24 RX ADMIN — OXYCODONE 10 MG: 5 TABLET ORAL at 17:05

## 2025-05-24 RX ADMIN — ACETAMINOPHEN 650 MG: 325 TABLET ORAL at 05:47

## 2025-05-24 ASSESSMENT — PAIN DESCRIPTION - LOCATION
LOCATION: BACK

## 2025-05-24 ASSESSMENT — PAIN DESCRIPTION - DESCRIPTORS
DESCRIPTORS: ACHING;SHARP
DESCRIPTORS: ACHING;DISCOMFORT
DESCRIPTORS: ACHING;CRUSHING;DISCOMFORT

## 2025-05-24 ASSESSMENT — PAIN DESCRIPTION - ORIENTATION
ORIENTATION: MID
ORIENTATION: MID
ORIENTATION: LOWER;MID;POSTERIOR
ORIENTATION: MID

## 2025-05-24 ASSESSMENT — PAIN DESCRIPTION - FREQUENCY: FREQUENCY: CONTINUOUS

## 2025-05-24 ASSESSMENT — PAIN DESCRIPTION - ONSET: ONSET: ON-GOING

## 2025-05-24 ASSESSMENT — PAIN SCALES - GENERAL
PAINLEVEL_OUTOF10: 2
PAINLEVEL_OUTOF10: 7
PAINLEVEL_OUTOF10: 6
PAINLEVEL_OUTOF10: 7
PAINLEVEL_OUTOF10: 2

## 2025-05-24 ASSESSMENT — PAIN DESCRIPTION - PAIN TYPE: TYPE: SURGICAL PAIN

## 2025-05-24 ASSESSMENT — PAIN - FUNCTIONAL ASSESSMENT
PAIN_FUNCTIONAL_ASSESSMENT: ACTIVITIES ARE NOT PREVENTED

## 2025-05-24 NOTE — DISCHARGE SUMMARY
Discharge Summary    Date of Admission: 5/22/2025  5:26 AM  Date of Discharge: 5/24/2025  Admission Diagnosis: Spinal stenosis, lumbar region with neurogenic claudication  Discharge Diagnosis: Same   Condition on Discharge: good  Attending for Admission: Mark Anthony Waller MD  Procedures: Procedure(s) (LRB):  LUMBAR 4 - SACRAL 1 ANTERIOR LUMBAR INTERBODY FUSION WITH PEDICLE SCREW FIXATION (N/A)  . (N/A)  Consults: IP CONSULT TO HOSPITALIST    Reason for Admission:  Maryan Yates is a 54 y.o. female patient who was admitted to the hospital for complaints of back pain with neurogenic claudication. She underwent the procedure listed above on 5/22/2025.     Hospital Course:  After surgery, Her pre-operative back pain was Present. The pain was well-controlled on oral medications.  The incision was clean, dry and intact. There was no erythema or edema around the surgical site. Prior to discharge She was eating well, urinating and ambulating with a steady gait.    Discharge Vitals/Labs:  /80   Pulse 86   Temp 97.7 °F (36.5 °C) (Oral)   Resp 16   Ht 1.702 m (5' 7\")   Wt 102.8 kg (226 lb 9.6 oz)   LMP 06/18/2011   SpO2 98%   BMI 35.49 kg/m²   CBC:   Lab Results   Component Value Date/Time    WBC 7.8 05/23/2025 05:22 AM    RBC 4.15 05/23/2025 05:22 AM    HGB 12.2 05/23/2025 05:22 AM    HCT 36.2 05/23/2025 05:22 AM    MCV 87.4 05/23/2025 05:22 AM    MCH 29.5 05/23/2025 05:22 AM    MCHC 33.8 05/23/2025 05:22 AM    RDW 13.6 05/23/2025 05:22 AM     05/23/2025 05:22 AM    MPV 8.6 05/23/2025 05:22 AM     BMP:    Lab Results   Component Value Date/Time     05/23/2025 05:22 AM    K 3.7 05/23/2025 05:22 AM     05/23/2025 05:22 AM    CO2 25 05/23/2025 05:22 AM    BUN 13 05/23/2025 05:22 AM    CREATININE 0.7 05/23/2025 05:22 AM    CALCIUM 8.4 05/23/2025 05:22 AM    GFRAA >60 07/27/2022 06:21 AM    GFRAA >60 07/11/2011 12:23 PM    LABGLOM >90 05/23/2025 05:22 AM    LABGLOM >60 03/23/2024 06:27 AM  release tablet       You can get these medications from any pharmacy    You don't need a prescription for these medications  sennosides-docusate sodium 8.6-50 MG tablet         Discharge Destination:  The patient was discharged to Home.     Follow-up:  The patient is to follow-up with Mark Anthony Waller MD in the office in 2 weeks      Discharge Instructions:   Verbal and written discharge instructions were given to the patient at the time of discharge.    Electronically signed by: MICK Mckay CNP, APRN-CNP, 5/24/2025 9:33 AM  [phone]

## 2025-05-24 NOTE — PROGRESS NOTES
Patient is A&O x4. VSS. Medications managed per MAR. Patient ambulates x1 GB and walker, has BRP. Safety precautions in place, bedside table and call light within reach.

## 2025-05-24 NOTE — CARE COORDINATION
Case Management Assessment            Discharge Note                    Date / Time of Note: 5/24/2025 12:02 PM                  Discharge Note Completed by: Blessing Villegas RN    Patient Name: Maryan Yates   YOB: 1970  Diagnosis: Lumbar spondylolysis [M43.06]  Lumbar radiculopathy [M54.16]  Spinal stenosis, lumbar region with neurogenic claudication [M48.062]   Date / Time: 5/22/2025  5:26 AM    Current PCP: Claudine Pandey APRN - CNP  Clinic patient: No    Hospitalization in the last 30 days: No       Advance Directives:  Code Status: Full Code  [place] DNR form completed and on chart: No    Financial:  Payor: [place] BCBS / Plan: [place]BS - [place] PPO / Product Type: *No Product type* /      Pharmacy:    [id number] [address] [facility] [address] - P [phone] - F [phone]  [address] [place] [address]  Phone: [phone] Fax: [phone]    [id number] - [facility] [address] - P [phone] - F [phone]  [address]  [facility] [address]  Phone: [phone] Fax: [phone]      Assistance purchasing medications?: Potential Assistance Purchasing Medications: No  Assistance provided by Case Management: None at this time    Does patient want to participate in local refill/ meds to beds program?: Yes    Meds To Beds General Rules:  1. Can ONLY be done Monday- Friday between 8:30am-5pm  2. Prescription(s) must be in pharmacy by 3pm to be filled same day  3.Copy of patient's insurance/ prescription drug card and patient face sheet must be sent along with the prescription(s)  4. Cost of Rx cannot be added to hospital bill. If financial assistance is needed, please contact unit  or ;  or  CANNOT provide pharmacy voucher for patients co-pays  5. Patients can then  the prescription on their way out of the hospital at discharge, or pharmacy can deliver to the bedside if staff  Plan for Transition of Care is related to the following treatment goals of Lumbar spondylolysis [M43.06]  Lumbar radiculopathy [M54.16]  Spinal stenosis, lumbar region with neurogenic claudication [M48.062]    The Patient and/or patient representative Maryan and her family were provided with a choice of provider and agrees with the discharge plan Yes    Freedom of choice list was provided with basic dialogue that supports the patient's individualized plan of care/goals and shares the quality data associated with the providers. Yes    Care Transitions patient: No    Blessing Villegas RN  [facility]  Case Management Department  Ph: [phone]  W/E Coverage

## 2025-05-24 NOTE — PROGRESS NOTES
Patient is alert and orient X4, VSS, patient is up ambulating per gaitbelt walker, contact assist the patient is voiding per BRP, the patient is tolerating a reg diet, the patient had no actue issues on this shift, the patient will be discharging to home at 5pm, the patients tel monitor has been removed the patient will have all standard fall precautions in place ,call light and tray table are in reach.

## 2025-05-24 NOTE — PROGRESS NOTES
Physical Therapy  Discharge note    Attempted to see pt this pm-she declined stating she is going home at 5 pm. Pt denied any concerns about getting up steps at home. Will sign off per pt request.     Mercedes Washburn, PT

## 2025-05-24 NOTE — PLAN OF CARE
Problem: Discharge Planning  Goal: Discharge to home or other facility with appropriate resources  5/24/2025 1048 by Jeannette Encinas, RN  Outcome: Progressing   Case management is consulted for discharge.  Problem: Pain  Goal: Verbalizes/displays adequate comfort level or baseline comfort level  5/24/2025 1048 by Jeannette Encinas, RN  Outcome: Progressing   Pain medications are managed by the MAR.  Problem: Safety - Adult  Goal: Free from fall injury  5/24/2025 1048 by Jeannette Encinas, RN  Outcome: Progressing   Patient has all standard fall precautions in place.

## 2025-05-28 ENCOUNTER — TELEPHONE (OUTPATIENT)
Dept: FAMILY MEDICINE CLINIC | Age: 55
End: 2025-05-28

## 2025-05-28 NOTE — TELEPHONE ENCOUNTER
Care Transitions Initial Follow Up Call    Outreach made within 2 business days of discharge: No    Patient: Maryan Yates Patient : 1970   MRN: [id number]  Reason for Admission: Spinal stenosis, lumbar region with neurogenic claudication   Discharge Date: 25       Spoke with: patient    Discharge department/facility: [facility] Interactive Patient Contact:  Was patient able to fill all prescriptions: Yes  Was patient instructed to bring all medications to the follow-up visit: Yes  Is patient taking all medications as directed in the discharge summary? Yes  Does patient understand their discharge instructions: Yes  Does patient have questions or concerns that need addressed prior to 7-14 day follow up office visit: no    Additional needs identified to be addressed with provider  No needs identified             Scheduled appointment with PCP within 7-14 days    Follow Up  No future appointments.    Called and spoke w pt. Pt declined scheduling hsfu. Pt stated she has a f/u scheduled with her neurosurgeon.     ASCENCION FONTANEZ MA

## 2025-05-29 NOTE — OP NOTE
Operative Report    PATIENT NAME: Maryan Yates  YOB: 1970  MEDICAL RECORD# [id number]  SURGERY DATE: 5/22/2025  SURGEON:  Mark Anthony Payton MD, PhD  ASSISTANT:  June Steven PA-C., served as assistant on this surgery due to the complex nature of the surgery and the lack of a resident or fellow assistant. She provided assistance with critical tissue retraction at parts of the surgery, wound closure and assistance with protecting critical neuro elements  DICTATED BY: Mark Anthony Payton MD    CO-SURGEON:  Dilshad Ramos MD. He provided assistance with exposure, manipulation of the great vessels placement of implants and closure during the anterior portion of the procedure.                                      PREOPERATIVE DIAGNOSIS:  1.  Spondylosis L4-5 and L5-S1 associated with severe foraminal/central stenosis, radiculopathy     POSTOPERATIVE DIAGNOSIS:  1.   Same.     PROCEDURES PERFORMED:  1.  Anterior diskectomy at L4-5, retroperitoneal approach performed by Dr. Ramos with interbody fusion   2.  Placement of Synthes SynFix ALIF cage with a medium footprint packed with Infuse/Fibergraft into the L4-5 interspace  3.  Anterior diskectomy at L5-S1, retroperitoneal approach performed by Dr. Ramos with interbody fusion   4.  Placement of Synthes SynFix ALIF cage with a large footprint packed with Infuse/Fibergraft into the L5-S1 interspace  5.  Placement of percutaneous Viper Prime pedicle screws and rods into the pedicles of L4-S1 bilaterally  6.  Posterolateral facet arthrodesis at L4-S1  7.  Intraoperative monitoring for motor and sensory potentials, stimulation of lumbosacral plexus, pedicle screw stimulation  8.  Stereotactic neuro-navigation with O-Arm CT and Stealth     ANESTHESIA:  General     IMPLANTS:  Synthes SynCage ALIF grafts at L3-4, L4-5 and L5-S1  L4-5: 12 mm, 10 degree, medium SynFix  L5-S1: 13.5 mm, 14 degree, large SynCage  Synthes retaining screws into the bodies of L4,  standard fashion using the periosteal elevator, the various shaped curettes, removing the disc down to the posterior longitudinal ligament.  The various distractors were placed and I eventually chose a 13.5 mm, 14 degree lordosed cage with a medium footprint. The cage was packed with Infuse/Fibergraft mixed with autologous BMA. The cage was advanced into the disc space in good position re-establishing good lordosis. The 20 mm SynFix cancellous screw was advanced into L5 to prevent graft migration. AP and lateral x-rays showed satisfactory placement of the device and screws.      20cc Exparel was mixed with 20cc 1/2% Marcaine and was infiltrated into the anterior abdominal wall muscle and subcutaneous tissue. Dr. Ramos then performed a multi-layer closure of the abdomen, this will be described in a separate operative note.     She was then placed prone on a David table using the O-Arm system. All bony prominences were inspected and padded prior to sterile draping. The lumbosacral area was then prepped and draped in the usual sterile fashion. A small stab incision was then made over the PSIS, a stay pin was affixed to the bone and the stereotactic array was secured. The wound was covered with sterile draps and the O-Arm intraoperative CT was brought into the field to acquire imaging. The images were loaded into the Stealth guidance system and used for pedicle screw placement.      Using a #15 blade knife, the skin was incised 3.5 cm off the midline and centered at the L4-S1 interspaces in a mirror image fashion bilaterally. I used the navigable Midas Moe to cannulate the pedicle of L4 on the right, a Viper pedicle screw was then placed in the pedicle. This was all performed under real image guidance. The procedure was repeated at the L5 and S1 pedicle on the right, and the L4-S1 pedicles on the left. Excellent bony fixation was achieved and all screws stimulated to the max threshold of 30 mV. No spontaneous EMG

## 2025-06-18 ENCOUNTER — OFFICE VISIT (OUTPATIENT)
Dept: FAMILY MEDICINE CLINIC | Age: 55
End: 2025-06-18
Payer: COMMERCIAL

## 2025-06-18 VITALS
TEMPERATURE: 97.3 F | OXYGEN SATURATION: 96 % | BODY MASS INDEX: 34.99 KG/M2 | HEART RATE: 84 BPM | WEIGHT: 223.4 LBS | DIASTOLIC BLOOD PRESSURE: 70 MMHG | RESPIRATION RATE: 16 BRPM | SYSTOLIC BLOOD PRESSURE: 102 MMHG

## 2025-06-18 DIAGNOSIS — H00.012 HORDEOLUM EXTERNUM OF RIGHT LOWER EYELID: Primary | ICD-10-CM

## 2025-06-18 PROCEDURE — 99213 OFFICE O/P EST LOW 20 MIN: CPT

## 2025-06-18 RX ORDER — ERYTHROMYCIN 5 MG/G
OINTMENT OPHTHALMIC
Qty: 1 G | Refills: 0 | Status: SHIPPED | OUTPATIENT
Start: 2025-06-18

## 2025-06-18 ASSESSMENT — PATIENT HEALTH QUESTIONNAIRE - PHQ9
2. FEELING DOWN, DEPRESSED OR HOPELESS: NOT AT ALL
SUM OF ALL RESPONSES TO PHQ QUESTIONS 1-9: 0
SUM OF ALL RESPONSES TO PHQ QUESTIONS 1-9: 0
1. LITTLE INTEREST OR PLEASURE IN DOING THINGS: NOT AT ALL
SUM OF ALL RESPONSES TO PHQ QUESTIONS 1-9: 0
SUM OF ALL RESPONSES TO PHQ QUESTIONS 1-9: 0

## 2025-06-18 ASSESSMENT — ENCOUNTER SYMPTOMS
EYE REDNESS: 1
COUGH: 0
SHORTNESS OF BREATH: 0
VOMITING: 0
NAUSEA: 0
EYE PAIN: 1
BACK PAIN: 1

## 2025-06-18 NOTE — PROGRESS NOTES
[facility]  2025    Maryan Yates (:  1970) is a 54 y.o. female, here for evaluation of the following medical concerns:    Chief Complaint   Patient presents with    Stye     X abt 2 days redness, uncomfortable        ASSESSMENT/ PLAN  1. Hordeolum externum of right lower eyelid  - erythromycin (ROMYCIN) 5 MG/GM ophthalmic ointment; Apply one ribbon in eye(s), 4 times a   day for 10 days  Dispense: 1 g; Refill: 0      - Supportive care measures discussed, such as the Adilia mask for warm compresses.     Return if symptoms worsen or fail to improve.    HPI  Here today with an acute eye issue.   Right eye. Started a few days ago with redness and swelling to the lower lid.  Woke up this morning and noticed a lump in the lower eye lid.  Feels like \"pressure.\" Tender.  No crusting or draining.  She does not wear contacts.  Doesn't believe that she got anything in her eye that she can recall.    Has been doing warm compresses.    She is s/p lumbar fusion on 25.  Doing well.      ROS  Review of Systems   Constitutional:  Negative for chills, fatigue and fever.   HENT: Negative.     Eyes:  Positive for pain and redness.   Respiratory:  Negative for cough and shortness of breath.    Cardiovascular:  Negative for chest pain, palpitations and leg swelling.   Gastrointestinal:  Negative for nausea and vomiting.   Genitourinary:  Negative for frequency.   Musculoskeletal:  Positive for back pain. Negative for myalgias.   Skin: Negative.    Neurological:  Negative for dizziness, weakness and headaches.   Psychiatric/Behavioral: Negative.         HISTORIES  Current Outpatient Medications on File Prior to Visit   Medication Sig Dispense Refill    Calcium Carbonate (CALCI-CHEW PO) Take 2 tablets by mouth daily (Patient not taking: Reported on 2025)      [Paused] Multiple Vitamin (MULTIVITAMIN, BARIATRIC FUSION COMPLETE, CHEW TAB) Take 2 tablets by mouth daily (Patient not taking: Reported on

## (undated) DEVICE — ORTHO PRE OP PACK: Brand: MEDLINE INDUSTRIES, INC.

## (undated) DEVICE — FORCEPS BX L240CM JAW DIA2.8MM L CAP W/ NDL MIC MESH TOOTH

## (undated) DEVICE — COVER,MAYO STAND,XL,STERILE: Brand: MEDLINE

## (undated) DEVICE — GLOVE ORANGE PI 7 1/2   MSG9075

## (undated) DEVICE — TUBING, SUCTION, 1/4" X 12', STRAIGHT: Brand: MEDLINE

## (undated) DEVICE — SUTURE PERMAHAND SZ 2-0 L30IN NONABSORBABLE BLK SILK W/O A305H

## (undated) DEVICE — SHEET, T, LAPAROTOMY, STERILE: Brand: MEDLINE

## (undated) DEVICE — DEVICE CLSR 10/12MM XL PRT SYS SUT PASS ST DISP CARTER

## (undated) DEVICE — SPK10110 JACKSON TABLE KIT: Brand: SPK10110 JACKSON TABLE KIT

## (undated) DEVICE — APPLIER CLP L9.375IN APER 2.1MM CLS L3.8MM 20 SM TI CLP

## (undated) DEVICE — COVER LT HNDL BLU PLAS

## (undated) DEVICE — GLOVE ORANGE PI 7   MSG9070

## (undated) DEVICE — TOWEL,STOP FLAG GOLD N-W: Brand: MEDLINE

## (undated) DEVICE — ROD SPNL L65MM TI LORDOSED FOR MINIMALLY INVASIVE SURG SYS: Type: IMPLANTABLE DEVICE | Site: SPINE LUMBAR | Status: NON-FUNCTIONAL

## (undated) DEVICE — SYRINGE MED 10ML TRNSLUC BRL PLUNG BLK MRK POLYPR CTRL

## (undated) DEVICE — SURE SET-DOUBLE BASIN-LF: Brand: MEDLINE INDUSTRIES, INC.

## (undated) DEVICE — COVER LT HNDL CAM BLU DISP W/ SURG CTRL

## (undated) DEVICE — PACK,UNIVERSAL,NO GOWNS: Brand: MEDLINE

## (undated) DEVICE — DRAPE MICSCP W54XL150IN W/ 4 BINOC GLS LENS LEICA

## (undated) DEVICE — 4-PORT MANIFOLD: Brand: NEPTUNE 2

## (undated) DEVICE — STAPLER 60 RELOAD BLUE: Brand: SUREFORM

## (undated) DEVICE — LIQUIBAND RAPID ADHESIVE 36/CS 0.8ML: Brand: MEDLINE

## (undated) DEVICE — SUTURE ETHILON SZ 3-0 L30IN NONABSORBABLE BLK PSLX L36MM 3/8 1683H

## (undated) DEVICE — AGENT HEMOSTATIC SURGIFLOW MATRIX KIT W/THROMBIN

## (undated) DEVICE — SYRINGE MED 30ML STD CLR PLAS LUERLOCK TIP N CTRL DISP

## (undated) DEVICE — UNDERGLOVE SURG SZ 8 BLU LTX FREE SYN POLYISOPRENE POLYMER

## (undated) DEVICE — GLOVE SURG SZ 75 L12IN THK75MIL DK GRN LTX FREE

## (undated) DEVICE — MEDIA CONTRAST OPTIRAY 300 100ML

## (undated) DEVICE — SENSOR PLSE OXMTR AD CBL L3FT ADH TRANSMISSIVE

## (undated) DEVICE — MEDIA CONTRAST INJ OPTIRAY 300 50 ML

## (undated) DEVICE — CELLERATERX® SURGICAL ACTIVATED COLLAGEN® POWDER IS TYPE I BOVINE HYDROLYZED COLLAGEN AND CONTAINS NO ADDITIVES.: Brand: CELLERATERX SURGICAL

## (undated) DEVICE — PROGRASP FORCEPS: Brand: ENDOWRIST

## (undated) DEVICE — BLADELESS OBTURATOR, LONG: Brand: WECK VISTA

## (undated) DEVICE — PIN 9733236 150MM STERILE PERC REF

## (undated) DEVICE — DRAPE 33X23IN INCISE ANTIMICROB IOBAN 2

## (undated) DEVICE — OPEN-END FLEXI-TIP URETERAL CATHETER: Brand: FLEXI-TIP

## (undated) DEVICE — STYLET SURG VIPER PRIM

## (undated) DEVICE — PUMP SUC IRR TBNG L10FT W/ HNDPC ASSEMB STRYKEFLOW 2

## (undated) DEVICE — SOLUTION IV 1000ML 0.9% SOD CHL

## (undated) DEVICE — GUIDEWIRE ENDOSCP L150CM DIA0.035IN TIP 3CM PTFE NIT

## (undated) DEVICE — SPONGE,LAP,4"X18",XR,ST,5/PK,40PK/CS: Brand: MEDLINE INDUSTRIES, INC.

## (undated) DEVICE — SUTURE VCRL SZ 0 L54IN ABSRB VLT W/O NDL POLYGLACTIN 910 J616H

## (undated) DEVICE — SUTURE VIC COAT BR VIO CP2 4-0 18IN J392H

## (undated) DEVICE — SUTURE VCRL SZ 4-0 L18IN ABSRB UD L19MM PS-2 3/8 CIR PRIM J496H

## (undated) DEVICE — SYRINGE MED 10ML SLIP TIP BLNT FILL AND LUERLOCK DISP

## (undated) DEVICE — SNAPSECURE FOLEY DEVICE: Brand: MEDLINE

## (undated) DEVICE — SOLUTION IRRIG 3000ML 0.9% SOD CHL USP UROMATIC PLAS CONT

## (undated) DEVICE — CATHETER URETH 20FR 5CC BLLN SIL ELASTMR F 2 W

## (undated) DEVICE — SLEEVE PROTCT THRD LCK FOR SYNFIX EVOLUTION SYS

## (undated) DEVICE — ARM DRAPE

## (undated) DEVICE — TROCAR: Brand: KII FIOS FIRST ENTRY

## (undated) DEVICE — APPLIER LIG CLP M L11IN TI STR RNG HNDL FOR 20 CLP DISP

## (undated) DEVICE — LARGE NEEDLE DRIVER: Brand: ENDOWRIST

## (undated) DEVICE — CADIERE FORCEPS: Brand: ENDOWRIST

## (undated) DEVICE — SUTURE VICRYL + SZ 0 L18IN ABSRB UD L36MM CT-1 1/2 CIR VCP840D

## (undated) DEVICE — SPONGE,LAP,18"X18",DLX,XR,ST,5/PK,40/PK: Brand: MEDLINE

## (undated) DEVICE — LAPAROSCOPIC SCISSORS: Brand: EPIX LAPAROSCOPIC SCISSORS

## (undated) DEVICE — 60 ML SYRINGE,CATHETER TIP: Brand: MONOJECT

## (undated) DEVICE — VISIGI 3D®  CALIBRATION SYSTEM  SIZE 36FR STD W/ BULB: Brand: BOEHRINGER® VISIGI 3D™ SLEEVE GASTRECTOMY CALIBRATION SYSTEM, SIZE 36FR W/BULB

## (undated) DEVICE — BLADE ES ELASTOMERIC COAT INSUL DURABLE BEND UPTO 90DEG

## (undated) DEVICE — ROBOTIC: Brand: MEDLINE INDUSTRIES, INC.

## (undated) DEVICE — VESSEL SEALER EXTEND: Brand: ENDOWRIST

## (undated) DEVICE — SUTURE VICRYL + SZ 3-0 L36IN ABSRB UD L36MM CT-1 1/2 CIR VCP944H

## (undated) DEVICE — NEEDLE INSUF L150MM DIA2MM DISP FOR PNEUMOPERI ENDOPATH

## (undated) DEVICE — CYSTO: Brand: MEDLINE INDUSTRIES, INC.

## (undated) DEVICE — GARMENT,MEDLINE,DVT,INT,CALF,MED, GEN2: Brand: MEDLINE

## (undated) DEVICE — TOOL MR8-14MH30 MR8 14CM MATCH 3MM: Brand: MIDAS REX MR8

## (undated) DEVICE — RADIFOCUS GLIDEWIRE: Brand: GLIDEWIRE

## (undated) DEVICE — 40583 XL ADVANCED TRENDELENBURG POSITIONING KIT: Brand: 40583 XL ADVANCED TRENDELENBURG POSITIONING KIT

## (undated) DEVICE — SYRINGE IRRIG 60ML SFT PLIABLE BLB EZ TO GRP 1 HND USE W/

## (undated) DEVICE — NITINOL STONE RETRIEVAL BASKET: Brand: ZERO TIP

## (undated) DEVICE — SUTURE ABSORBABLE MONOFILAMENT 2-0 SH 6 IN STRATAFIX SPRL SXPP1B415

## (undated) DEVICE — SUTURE ABSORBABLE MONOFILAMENT 0 CTX 60 IN VIO PDS + PDP990G

## (undated) DEVICE — SUTURE VICRYL SZ 2-0 L18IN ABSRB UD CT-1 L36MM 1/2 CIR J839D

## (undated) DEVICE — ADHESIVE SKIN CLSR 0.7ML TOP DERMBND ADV

## (undated) DEVICE — SPONGE,PEANUT,XRAY,ST,SM,3/8",5/CARD: Brand: MEDLINE INDUSTRIES, INC.

## (undated) DEVICE — ADHESIVE SKIN CLOSURE WND 8.661X1.5 IN 22 CM LIQUIBAND SECUR

## (undated) DEVICE — LAMINECTOMY: Brand: MEDLINE INDUSTRIES, INC.

## (undated) DEVICE — GLOVE SURG SZ 75 L12IN FNGR THK79MIL GRN LTX FREE

## (undated) DEVICE — BOWL MED L 32OZ PLAS W/ MOLD GRAD EZ OPN PEEL PCH

## (undated) DEVICE — SYSTEM SMK EVAC LAP TBNG FILTER HSNG BENT STYL PNK SEE CLR

## (undated) DEVICE — NEEDLE HYPO 22GA L1.5IN BLK POLYPR HUB S STL REG BVL STR

## (undated) DEVICE — DISPOSABLE REFLECTIVE SPHERES ATTACHED TO REFERENCE FRAMES AND SURGICAL INSTRUMENTS FOR USE DURING SURGICAL NAVIGATION IN IMAGE GUIDED SURGERY. ONE RETAIL CARTON IS MADE UP OF 5 SPHERES PER TRAY IN A POUCH. THERE ARE 12 TRAY-IN-POUCHES FOR A TOTAL OF 60 SPHERES.: Brand: NDI PASSIVE SPHERE

## (undated) DEVICE — CANNULA SEAL

## (undated) DEVICE — SPONGE,NEURO,0.5"X3",XR,STRL,LF,10/PK: Brand: MEDLINE

## (undated) DEVICE — 3M™ TEGADERM™ TRANSPARENT FILM DRESSING FRAME STYLE, 1626W, 4 IN X 4-3/4 IN (10 CM X 12 CM), 50/CT 4CT/CASE: Brand: 3M™ TEGADERM™

## (undated) DEVICE — SLEEVE PROTCT FOR SCRDRVR AND AWL SYNFIX EVOLUTION SYS

## (undated) DEVICE — BINDER ABD H12IN FOR 62-74IN WAIST UNIV 4 PNL PREM DSGN E

## (undated) DEVICE — AGENT HEMSTAT W2XL4IN OXIDIZED REGENERATED CELOS ABSRB

## (undated) DEVICE — GLOVE SURG SZ 8 CRM LTX FREE POLYISOPRENE POLYMER BEAD ANTI

## (undated) DEVICE — SUTURE ETHBND EXCEL SZ 0 L30IN NONABSORBABLE GRN L26MM SH X834H

## (undated) DEVICE — TRAP FLUID

## (undated) DEVICE — STAPLER 60 RELOAD GREEN: Brand: SUREFORM

## (undated) DEVICE — 3M™ IOBAN™ 2 ANTIMICROBIAL INCISE DRAPE 6648EZ: Brand: IOBAN™ 2

## (undated) DEVICE — SINGLE ACTION PUMPING SYSTEM

## (undated) DEVICE — GARMENT,MEDLINE,DVT,INT,CALF,LG, GEN2: Brand: MEDLINE

## (undated) DEVICE — SAFESECURE,SECUREMENT,FOLEY CATH,STERILE: Brand: MEDLINE

## (undated) DEVICE — SOLUTION ANTIFOG VIS SYS CLEARIFY LAPSCP

## (undated) DEVICE — SUTURE MONOCRYL + SZ 4-0 L27IN ABSRB UD L19MM PS-2 3/8 CIR MCP426H